# Patient Record
Sex: MALE | Race: WHITE | NOT HISPANIC OR LATINO | Employment: FULL TIME | URBAN - METROPOLITAN AREA
[De-identification: names, ages, dates, MRNs, and addresses within clinical notes are randomized per-mention and may not be internally consistent; named-entity substitution may affect disease eponyms.]

---

## 2017-01-27 ENCOUNTER — TELEPHONE (OUTPATIENT)
Dept: MEDICAL GROUP | Facility: CLINIC | Age: 57
End: 2017-01-27

## 2017-01-27 DIAGNOSIS — J00 ACUTE NASOPHARYNGITIS: ICD-10-CM

## 2017-01-27 RX ORDER — AZITHROMYCIN 250 MG/1
TABLET, FILM COATED ORAL
Qty: 6 TAB | Refills: 0 | Status: SHIPPED | OUTPATIENT
Start: 2017-01-27 | End: 2017-05-24

## 2017-01-28 NOTE — TELEPHONE ENCOUNTER
Patient's spouse called stating both of them are experimenting severe sinus congestion and would like to please have a Zpack sent to their pharmacy for each one?  Please advise    Gil Hart  [phone] (home)

## 2017-01-30 DIAGNOSIS — I10 ESSENTIAL HYPERTENSION: ICD-10-CM

## 2017-01-30 RX ORDER — CHLORTHALIDONE 25 MG/1
TABLET ORAL
Refills: 0 | OUTPATIENT
Start: 2017-01-30

## 2017-01-30 RX ORDER — CHLORTHALIDONE 25 MG/1
25 TABLET ORAL DAILY
Qty: 90 TAB | Refills: 3 | Status: SHIPPED | OUTPATIENT
Start: 2017-01-30 | End: 2018-02-09 | Stop reason: SDUPTHER

## 2017-01-30 NOTE — TELEPHONE ENCOUNTER
From: Gil Hart  To: Bhupinder Ryder M.D.  Sent: 1/30/2017 12:05 PM PST  Subject: Medication Renewal Request    Original authorizing provider: ALEX Martinez would like a refill of the following medications:  chlorthalidone (HYGROTON) 25 MG Tab [Bhupinder Ryder M.D.]    Preferred pharmacy: [facility]/PHARMACY #[id number] - [place], [place] - [address].    Comment:  [facility]/pharmacy: GIL, your Dr did not authorize a request to refill your Rx for CHL. Pls call  for more information. Reply HELP for help thank you

## 2017-02-06 DIAGNOSIS — E11.9 TYPE II DIABETES MELLITUS, WELL CONTROLLED (HCC): ICD-10-CM

## 2017-02-06 DIAGNOSIS — C91.10 CLL (CHRONIC LYMPHOCYTIC LEUKEMIA) (HCC): ICD-10-CM

## 2017-02-09 ENCOUNTER — HOSPITAL ENCOUNTER (OUTPATIENT)
Dept: LAB | Facility: MEDICAL CENTER | Age: 57
End: 2017-02-09
Attending: INTERNAL MEDICINE
Payer: COMMERCIAL

## 2017-02-09 DIAGNOSIS — C91.10 CLL (CHRONIC LYMPHOCYTIC LEUKEMIA) (HCC): ICD-10-CM

## 2017-02-09 DIAGNOSIS — E11.9 TYPE II DIABETES MELLITUS, WELL CONTROLLED (HCC): ICD-10-CM

## 2017-02-09 LAB
ANISOCYTOSIS BLD QL SMEAR: ABNORMAL
BASOPHILS # BLD AUTO: 0.1 K/UL (ref 0–0.12)
BASOPHILS NFR BLD AUTO: 0.9 % (ref 0–1.8)
EOSINOPHIL # BLD: 0 K/UL (ref 0–0.51)
EOSINOPHIL NFR BLD AUTO: 0 % (ref 0–6.9)
ERYTHROCYTE [DISTWIDTH] IN BLOOD BY AUTOMATED COUNT: 40.3 FL (ref 35.9–50)
EST. AVERAGE GLUCOSE BLD GHB EST-MCNC: 192 MG/DL
HBA1C MFR BLD: 8.3 % (ref 0–5.6)
HCT VFR BLD AUTO: 40.7 % (ref 42–52)
HGB BLD-MCNC: 13.4 G/DL (ref 14–18)
LYMPHOCYTES # BLD: 6.19 K/UL (ref 1–4.8)
LYMPHOCYTES NFR BLD AUTO: 53.4 % (ref 22–41)
MANUAL DIFF BLD: NORMAL
MCH RBC QN AUTO: 28.9 PG (ref 27–33)
MCHC RBC AUTO-ENTMCNC: 32.9 G/DL (ref 33.7–35.3)
MCV RBC AUTO: 87.7 FL (ref 81.4–97.8)
MONOCYTES # BLD: 0.41 K/UL (ref 0–0.85)
MONOCYTES NFR BLD AUTO: 3.5 % (ref 0–13.4)
MORPHOLOGY BLD-IMP: NORMAL
NEUTROPHILS # BLD: 4.9 K/UL (ref 1.82–7.42)
NEUTROPHILS NFR BLD AUTO: 42.2 % (ref 44–72)
NRBC # BLD AUTO: 0.02 K/UL
NRBC BLD-RTO: 0.2 /100 WBC
OVALOCYTES BLD QL SMEAR: NORMAL
PLATELET # BLD AUTO: 156 K/UL (ref 164–446)
PLATELET BLD QL SMEAR: NORMAL
PMV BLD AUTO: 11.6 FL (ref 9–12.9)
POIKILOCYTOSIS BLD QL SMEAR: NORMAL
RBC # BLD AUTO: 4.64 M/UL (ref 4.7–6.1)
RBC BLD AUTO: PRESENT
WBC # BLD AUTO: 11.6 K/UL (ref 4.8–10.8)

## 2017-02-09 PROCEDURE — 36415 COLL VENOUS BLD VENIPUNCTURE: CPT

## 2017-02-09 PROCEDURE — 83036 HEMOGLOBIN GLYCOSYLATED A1C: CPT

## 2017-02-09 PROCEDURE — 85007 BL SMEAR W/DIFF WBC COUNT: CPT

## 2017-02-09 PROCEDURE — 85027 COMPLETE CBC AUTOMATED: CPT

## 2017-02-13 ENCOUNTER — OFFICE VISIT (OUTPATIENT)
Dept: MEDICAL GROUP | Facility: CLINIC | Age: 57
End: 2017-02-13
Payer: COMMERCIAL

## 2017-02-13 VITALS
HEART RATE: 70 BPM | TEMPERATURE: 98.1 F | HEIGHT: 74 IN | SYSTOLIC BLOOD PRESSURE: 145 MMHG | DIASTOLIC BLOOD PRESSURE: 95 MMHG | OXYGEN SATURATION: 99 % | RESPIRATION RATE: 14 BRPM | WEIGHT: 216 LBS | BODY MASS INDEX: 27.72 KG/M2

## 2017-02-13 DIAGNOSIS — E11.9 TYPE 2 DIABETES MELLITUS WITHOUT COMPLICATION, WITHOUT LONG-TERM CURRENT USE OF INSULIN (HCC): ICD-10-CM

## 2017-02-13 DIAGNOSIS — I63.30 CEREBROVASCULAR ACCIDENT (CVA) DUE TO THROMBOSIS OF CEREBRAL ARTERY (HCC): ICD-10-CM

## 2017-02-13 DIAGNOSIS — Z98.84 HISTORY OF ROUX-EN-Y GASTRIC BYPASS: ICD-10-CM

## 2017-02-13 DIAGNOSIS — C91.10 CLL (CHRONIC LYMPHOCYTIC LEUKEMIA) (HCC): ICD-10-CM

## 2017-02-13 PROCEDURE — 99214 OFFICE O/P EST MOD 30 MIN: CPT | Performed by: INTERNAL MEDICINE

## 2017-02-13 NOTE — MR AVS SNAPSHOT
"        Gil Hailey   2017 8:20 AM   Office Visit   MRN: [id number]    Department:  [facility]   Dept Phone:  [phone]    Description:  Male : 1960   Provider:  Altaf Swenson D.O.           Reason for Visit     Follow-Up [facility] diabetes and stroke      Allergies as of 2017     Allergen Noted Reactions    Pcn [Penicillins] 2013         You were diagnosed with     CLL (chronic lymphocytic leukemia) (CMS-HCC)   [431975]       Type 2 diabetes mellitus without complication, without long-term current use of insulin (CMS-HCC)   [9246445]       History of Amanda-en-Y gastric bypass   [335588]         Vital Signs     Blood Pressure Pulse Temperature Respirations Height Weight    145/95 mmHg 70 36.7 °C (98.1 °F) 14 1.88 m (6' 2.02\") 97.977 kg (216 lb)    Body Mass Index Oxygen Saturation Smoking Status             27.72 kg/m2 99% Never Smoker          Basic Information     Date Of Birth Sex Race Ethnicity Preferred Language    1960 Male White Non- English      Problem List              ICD-10-CM Priority Class Noted - Resolved    Low back pain radiating to right leg M54.5   12/3/2013 - Present    Mixed hyperlipidemia E78.2   12/3/2013 - Present    Hypertension I10   12/3/2013 - Present    Vitamin D deficiency disease E55.9   12/3/2013 - Present    Personal history of alcoholism (CMS-HCC) F10.21   12/3/2013 - Present    History of Amanda-en-Y gastric bypass Z98.84   12/3/2013 - Present    History of chronic pancreatitis Z87.19   12/3/2013 - Present    CLL (chronic lymphocytic leukemia) (CMS-HCC) C91.10   2015 - Present    Type 2 diabetes mellitus without complication (CMS-HCC) E11.9   2016 - Present    Family history of cerebral aneurysm Z82.49   2016 - Present    Stroke (CMS-HCC) I63.9 High  2016 - Present    Essential hypertension I10   2016 - Present    Obesity (BMI 30-39.9) E66.9   2016 - Present      Health Maintenance        Date Due Completion " Dates    IMM HEP B VACCINE (1 of 3 - Primary Series) 1960 ---    IMM PNEUMOCOCCAL 19-64 (ADULT) HIGHEST RISK SERIES (2 of 3 - PPSV23) 1/4/2016 11/9/2015    IMM INFLUENZA (1) 9/1/2016 11/9/2015, 12/3/2013    URINE ACR / MICROALBUMIN 2/9/2017 2/9/2016, 2/6/2015, 12/18/2013    DIABETES MONOFILAMENT / LE EXAM 2/9/2017 2/9/2016 (Done), 2/10/2015 (Done), 1/17/2014 (Done)    Override on 2/9/2016: Done    Override on 2/10/2015: Done    Override on 1/17/2014: Done    A1C SCREENING 8/9/2017 2/9/2017, 9/1/2016, 8/3/2016, 2/9/2016, 2/6/2015, 12/18/2013    FASTING LIPID PROFILE 9/1/2017 9/1/2016, 2/9/2016, 2/6/2015, 12/18/2013    SERUM CREATININE 9/1/2017 9/1/2016, 4/13/2016, 4/12/2016, 2/9/2016, 11/4/2015, 2/6/2015, 12/18/2013, 10/17/2013    RETINAL SCREENING 9/2/2017 9/2/2016, 3/26/2015    COLONOSCOPY 5/11/2021 5/11/2011 (N/S)    Override on 5/11/2011: (N/S)    IMM DTaP/Tdap/Td Vaccine (2 - Td) 10/23/2025 10/23/2015            Current Immunizations     13-VALENT PCV PREVNAR 11/9/2015    Influenza TIV (IM) 12/3/2013    Influenza Vaccine Quad Inj (Pf) 11/9/2015    Tdap Vaccine 10/23/2015      Below and/or attached are the medications your provider expects you to take. Review all of your home medications and newly ordered medications with your provider and/or pharmacist. Follow medication instructions as directed by your provider and/or pharmacist. Please keep your medication list with you and share with your provider. Update the information when medications are discontinued, doses are changed, or new medications (including over-the-counter products) are added; and carry medication information at all times in the event of emergency situations     Allergies:  PCN - (reactions not documented)               Medications  Valid as of: February 13, 2017 -  9:29 AM    Generic Name Brand Name Tablet Size Instructions for use    Albuterol Sulfate (Aero Soln) albuterol 108 (90 BASE) MCG/ACT Inhale 2 Puffs by mouth every 6 hours as  needed for Shortness of Breath.        Azithromycin (Tab) ZITHROMAX 250 MG Two day one then qd x 4.        Blood Glucose Monitoring Suppl (Misc) Blood Glucose Monitoring Suppl SUPPLIES Test strips order: Test strips for One Touch Ultra 2 meter. Sig: use daily and prn ssx high or low sugar #100 RF x 3  Dx 250.00        Blood Glucose Monitoring Suppl (Misc) Blood Glucose Monitoring Suppl SUPPLIES Test strips order: Test strips for One Touch Ultra 2 meter. Sig: use qd and prn ssx high or low sugar #100 RF x 1        Celecoxib (Cap) CELEBREX 200 MG Take 1 Cap by mouth 2 times a day.        Chlorthalidone (Tab) HYGROTON 25 MG Take 1 Tab by mouth every day.        Cholecalciferol (Tab) vitamin D3 (cholecalciferol) 1000 UNIT Take 2 Tabs by mouth every day.        Clopidogrel Bisulfate (Tab) PLAVIX 75 MG Take 1 Tab by mouth every day.        Cyanocobalamin   Take  by mouth.        DiphenhydrAMINE HCl (Tab) BENADRYL 25 MG Take 50 mg by mouth every 6 hours as needed for Sleep.        GlipiZIDE (Tab) GLUCOTROL 5 MG TAKE 1 TABLET BY MOUTH TWICE DAILY        Loratadine   Take  by mouth.        Losartan Potassium (Tab) COZAAR 100 MG TAKE 1 TAB BY MOUTH DAILY.        MetFORMIN HCl (Tab) GLUCOPHAGE 1000 MG TAKE 1 TABLET BY MOUTH TWICE DAILY WITH MEALS        Metoprolol Tartrate (Tab) LOPRESSOR 100 MG Take 0.5 Tabs by mouth 2 times a day.        Multiple Vitamins-Minerals (Tab) CENTRUM  Take 1 Tab by mouth every day.        Multiple Vitamins-Minerals   Take  by mouth.        Oseltamivir Phosphate (Cap) TAMIFLU 75 MG Take 1 Cap by mouth 2 times a day.        Pravastatin Sodium (Tab) PRAVACHOL 40 MG TAKE 1 TABLET BY MOUTH ATBEDTIME        Tadalafil (Tab) CIALIS 20 MG TAKE 1 TABLET BY MOUTH ASNEEDED        .                 Medicines prescribed today were sent to:     [facility]/PHARMACY #[id number] - [place] - [address]    [address] [address]    Phone: [phone] Fax: [phone]    Open 24 Hours?: No      Medication  refill instructions:       If your prescription bottle indicates you have medication refills left, it is not necessary to call your provider’s office. Please contact your pharmacy and they will refill your medication.    If your prescription bottle indicates you do not have any refills left, you may request refills at any time through one of the following ways: The online [employer] system (except Urgent Care), by calling your provider’s office, or by asking your pharmacy to contact your provider’s office with a refill request. Medication refills are processed only during regular business hours and may not be available until the next business day. Your provider may request additional information or to have a follow-up visit with you prior to refilling your medication.   *Please Note: Medication refills are assigned a new Rx number when refilled electronically. Your pharmacy may indicate that no refills were authorized even though a new prescription for the same medication is available at the pharmacy. Please request the medicine by name with the pharmacy before contacting your provider for a refill.        Your To Do List     Future Labs/Procedures Complete By Expires    CBC WITH DIFFERENTIAL  As directed 2/14/2018    COMP METABOLIC PANEL  As directed 2/14/2018    CT-CARDIAC SCORING  As directed 2/13/2018    HEMOGLOBIN A1C  As directed 2/14/2018    LIPID PROFILE  As directed 2/14/2018    MICROALBUMIN CREAT RATIO URINE  As directed 2/13/2018      Referral     A referral request has been sent to our patient care coordination department. Please allow 3-5 business days for us to process this request and contact you either by phone or mail. If you do not hear from us by the 5th business day, please call us at [phone].           [employer] Access Code: Activation code not generated  Current [employer] Status: Active

## 2017-02-14 NOTE — PROGRESS NOTES
CC: Gil Hart is a 56 y.o. male is suffering from   Chief Complaint   Patient presents with   • Follow-Up     [facility] diabetes and stroke         SUBJECTIVE:  1. Cerebrovascular accident (CVA) due to thrombosis of cerebral artery (CMS-[facility])  Patient's here for follow-up regarding his CVA, patient and I have reviewed his previous MRI which does show that he has infarction which is minor in the left pontine region of the brain. Patient has recovered well from this with only minor numbness associated with his lips.     2. Type 2 diabetes mellitus without complication, without long-term current use of insulin (CMS-[facility])  Patient has a history of type 2 diabetes without complication, is having problems with worsening control suspect this is due to problems with diet and exercise. .     3. CLL (chronic lymphocytic leukemia) (CMS-[facility])  Patient with a history of CLL clinically stable    4. History of Amanda-en-Y gastric bypass  Patient has previously undergone gastric bypass surgery, we've discussed his need to be very careful with diet going forwards. Needs to improve on his exercise        Past social, family, history: , Lanie  Social History   Substance Use Topics   • Smoking status: Never Smoker    • Smokeless tobacco: Never Used   • Alcohol Use: No      Comment: Past history of alcoholism--last drink was 2010--attends Recovery Meetings at his [employer]         MEDICATIONS:    Current outpatient prescriptions:   •  chlorthalidone (HYGROTON) 25 MG Tab, Take 1 Tab by mouth every day., Disp: 90 Tab, Rfl: 3  •  glipiZIDE (GLUCOTROL) 5 MG Tab, TAKE 1 TABLET BY MOUTH TWICE DAILY, Disp: 180 Tab, Rfl: 3  •  metformin (GLUCOPHAGE) 1000 MG tablet, TAKE 1 TABLET BY MOUTH TWICE DAILY WITH MEALS, Disp: 180 Tab, Rfl: 3  •  losartan (COZAAR) 100 MG Tab, TAKE 1 TAB BY MOUTH DAILY., Disp: 90 Tab, Rfl: 3  •  metoprolol (LOPRESSOR) 100 MG Tab, Take 0.5 Tabs by mouth 2 times a day., Disp: 90 Tab, Rfl: 3  •  celecoxib (CELEBREX) 200 MG Cap,  Take 1 Cap by mouth 2 times a day., Disp: 60 Cap, Rfl: 3  •  clopidogrel (PLAVIX) 75 MG Tab, Take 1 Tab by mouth every day., Disp: 90 Tab, Rfl: 3  •  Blood Glucose Monitoring Suppl SUPPLIES Misc, Test strips order: Test strips for One Touch Ultra 2 meter. Sig: use qd and prn ssx high or low sugar #100 RF x 1, Disp: 100 Strip, Rfl: 1  •  tadalafil (CIALIS) 20 MG tablet, TAKE 1 TABLET BY MOUTH ASNEEDED, Disp: 18 Tab, Rfl: 3  •  diphenhydrAMINE (BENADRYL) 25 MG Tab, Take 50 mg by mouth every 6 hours as needed for Sleep., Disp: , Rfl:   •  pravastatin (PRAVACHOL) 40 MG tablet, TAKE 1 TABLET BY MOUTH ATBEDTIME, Disp: 90 Tab, Rfl: 3  •  Loratadine (CLARITIN PO), Take  by mouth., Disp: , Rfl:   •  Cyanocobalamin (VITAMIN B-12 PO), Take  by mouth., Disp: , Rfl:   •  Blood Glucose Monitoring Suppl SUPPLIES MISC, Test strips order: Test strips for One Touch Ultra 2 meter. Sig: use daily and prn ssx high or low sugar #100 RF x 3  Dx 250.00, Disp: 100 Each, Rfl: 3  •  Cholecalciferol (VITAMIN D3) 1000 UNIT TABS, Take 2 Tabs by mouth every day., Disp: 100 Tab, Rfl: 3  •  Multiple Vitamins-Minerals (CENTRUM) TABS, Take 1 Tab by mouth every day., Disp: 100 Tab, Rfl: 3  •  azithromycin (ZITHROMAX) 250 MG Tab, Two day one then qd x 4., Disp: 6 Tab, Rfl: 0  •  oseltamivir (TAMIFLU) 75 MG Cap, Take 1 Cap by mouth 2 times a day. (Patient not taking: Reported on 2/13/2017), Disp: 10 Cap, Rfl: 0  •  albuterol (VENTOLIN OR PROVENTIL) 108 (90 BASE) MCG/ACT Aero Soln inhalation aerosol, Inhale 2 Puffs by mouth every 6 hours as needed for Shortness of Breath. (Patient not taking: Reported on 2/13/2017), Disp: 8.5 g, Rfl: 3  •  Multiple Vitamins-Minerals (MULTIVITAMIN PO), Take  by mouth., Disp: , Rfl:     PROBLEMS:  Patient Active Problem List    Diagnosis Date Noted   • Stroke (CMS-[facility]) 04/12/2016     Priority: High   • Obesity (BMI 30-39.9) 09/02/2016   • Essential hypertension 04/13/2016   • Family history of cerebral aneurysm 02/09/2016  "  • Type 2 diabetes mellitus without complication (CMS-HCC) 02/05/2016   • CLL (chronic lymphocytic leukemia) (CMS-HCC) 11/09/2015   • Low back pain radiating to right leg 12/03/2013   • Mixed hyperlipidemia 12/03/2013   • Hypertension 12/03/2013   • Vitamin D deficiency disease 12/03/2013   • Personal history of alcoholism (CMS-HCC) 12/03/2013   • History of Amanda-en-Y gastric bypass 12/03/2013   • History of chronic pancreatitis 12/03/2013       REVIEW OF SYSTEMS:  Gen.:  No Nausea, Vomiting, fever, Chills.  Heart: No chest pain.  Lungs:  No shortness of Breath.  Psychological: Víctor unusual Anxiety depression     PHYSICAL EXAM   Constitutional: Alert, cooperative, not in acute distress.  Cardiovascular:  Rate Rhythm is regular without murmurs rubs clicks.     Thorax & Lungs: Clear to auscultation, no wheezing, rhonchi, or rales  HENT: Normocephalic, Atraumatic.  Eyes: PERRLA, EOMI, Conjunctiva normal.   Neck: Trachia is midline no swelling of the thyroid.   Lymphatic: No lymphadenopathy noted.   Musculoskeletal: No evidence of diabetic neuropathy to monofilament wire testing.   Neurologic: Alert & oriented x 3, cranial nerves II through XII are intact, Normal motor function, Normal sensory function, No focal deficits noted.   Psychiatric: Affect normal, Judgment normal, Mood normal.     VITAL SIGNS:/95 mmHg  Pulse 70  Temp(Src) 36.7 °C (98.1 °F)  Resp 14  Ht 1.88 m (6' 2.02\")  Wt 97.977 kg (216 lb)  BMI 27.72 kg/m2  SpO2 99%    Labs: Reviewed    Assessment:                                                     Plan:    1. Cerebrovascular accident (CVA) due to thrombosis of cerebral artery (CMS-HCC)  Cerebral vascular accident and MRI reviewed clinically stable    2. Type 2 diabetes mellitus without complication, without long-term current use of insulin (CMS-HCC)  Patient and I have discussed the importance of working on diet exercise  - COMP METABOLIC PANEL; Future  - CBC WITH DIFFERENTIAL; Future  - " CT-CARDIAC SCORING; Future  - MICROALBUMIN CREAT RATIO URINE; Future  - Diabetic Monofilament Lower Extremity Exam  Lipid profile  Hemoglobin A1c    3. CLL (chronic lymphocytic leukemia) (CMS-HCC)  Referral written to hematology oncology  - REFERRAL TO HEMATOLOGY ONCOLOGY Referral to?: Other    4. History of Amanda-en-Y gastric bypass  Patient needs to avoid nonsteroidal anti-inflammatories

## 2017-02-17 ENCOUNTER — OFFICE VISIT (OUTPATIENT)
Dept: MEDICAL GROUP | Facility: CLINIC | Age: 57
End: 2017-02-17
Payer: COMMERCIAL

## 2017-02-17 VITALS
SYSTOLIC BLOOD PRESSURE: 126 MMHG | HEIGHT: 74 IN | WEIGHT: 221 LBS | OXYGEN SATURATION: 100 % | TEMPERATURE: 97.7 F | DIASTOLIC BLOOD PRESSURE: 80 MMHG | BODY MASS INDEX: 28.36 KG/M2 | RESPIRATION RATE: 16 BRPM | HEART RATE: 94 BPM

## 2017-02-17 DIAGNOSIS — S81.801D WOUND, OPEN, LEG, RIGHT, SUBSEQUENT ENCOUNTER: ICD-10-CM

## 2017-02-17 PROCEDURE — 99213 OFFICE O/P EST LOW 20 MIN: CPT | Performed by: INTERNAL MEDICINE

## 2017-02-17 RX ORDER — CEPHALEXIN 500 MG/1
500 CAPSULE ORAL 4 TIMES DAILY
Qty: 28 CAP | Refills: 0 | Status: SHIPPED | OUTPATIENT
Start: 2017-02-17 | End: 2017-05-24

## 2017-02-18 NOTE — PROGRESS NOTES
CC: Gil Hart is a 56 y.o. male is suffering from   Chief Complaint   Patient presents with   • Laceration     (R) knee, fell 2 weeks ago         SUBJECTIVE:  1. Wound, open, leg, right, subsequent encounter  Patient's here for follow-up, has an open wound right leg. Is on Plavix, compression bandage was applied no evidence of infection        Past social, family, history: , Lanie  Social History   Substance Use Topics   • Smoking status: Never Smoker    • Smokeless tobacco: Never Used   • Alcohol Use: No      Comment: Past history of alcoholism--last drink was 2010--attends Recovery Meetings at his [employer]         MEDICATIONS:    Current outpatient prescriptions:   •  cephALEXin (KEFLEX) 500 MG Cap, Take 1 Cap by mouth 4 times a day., Disp: 28 Cap, Rfl: 0  •  chlorthalidone (HYGROTON) 25 MG Tab, Take 1 Tab by mouth every day., Disp: 90 Tab, Rfl: 3  •  azithromycin (ZITHROMAX) 250 MG Tab, Two day one then qd x 4., Disp: 6 Tab, Rfl: 0  •  oseltamivir (TAMIFLU) 75 MG Cap, Take 1 Cap by mouth 2 times a day., Disp: 10 Cap, Rfl: 0  •  glipiZIDE (GLUCOTROL) 5 MG Tab, TAKE 1 TABLET BY MOUTH TWICE DAILY, Disp: 180 Tab, Rfl: 3  •  metformin (GLUCOPHAGE) 1000 MG tablet, TAKE 1 TABLET BY MOUTH TWICE DAILY WITH MEALS, Disp: 180 Tab, Rfl: 3  •  losartan (COZAAR) 100 MG Tab, TAKE 1 TAB BY MOUTH DAILY., Disp: 90 Tab, Rfl: 3  •  metoprolol (LOPRESSOR) 100 MG Tab, Take 0.5 Tabs by mouth 2 times a day., Disp: 90 Tab, Rfl: 3  •  celecoxib (CELEBREX) 200 MG Cap, Take 1 Cap by mouth 2 times a day., Disp: 60 Cap, Rfl: 3  •  clopidogrel (PLAVIX) 75 MG Tab, Take 1 Tab by mouth every day., Disp: 90 Tab, Rfl: 3  •  Blood Glucose Monitoring Suppl SUPPLIES Misc, Test strips order: Test strips for One Touch Ultra 2 meter. Sig: use qd and prn ssx high or low sugar #100 RF x 1, Disp: 100 Strip, Rfl: 1  •  tadalafil (CIALIS) 20 MG tablet, TAKE 1 TABLET BY MOUTH ASNEEDED, Disp: 18 Tab, Rfl: 3  •  diphenhydrAMINE (BENADRYL) 25 MG Tab,  Take 50 mg by mouth every 6 hours as needed for Sleep., Disp: , Rfl:   •  albuterol (VENTOLIN OR PROVENTIL) 108 (90 BASE) MCG/ACT Aero Soln inhalation aerosol, Inhale 2 Puffs by mouth every 6 hours as needed for Shortness of Breath., Disp: 8.5 g, Rfl: 3  •  pravastatin (PRAVACHOL) 40 MG tablet, TAKE 1 TABLET BY MOUTH ATBEDTIME, Disp: 90 Tab, Rfl: 3  •  Loratadine (CLARITIN PO), Take  by mouth., Disp: , Rfl:   •  Multiple Vitamins-Minerals (MULTIVITAMIN PO), Take  by mouth., Disp: , Rfl:   •  Cyanocobalamin (VITAMIN B-12 PO), Take  by mouth., Disp: , Rfl:   •  Blood Glucose Monitoring Suppl SUPPLIES MISC, Test strips order: Test strips for One Touch Ultra 2 meter. Sig: use daily and prn ssx high or low sugar #100 RF x 3  Dx 250.00, Disp: 100 Each, Rfl: 3  •  Cholecalciferol (VITAMIN D3) 1000 UNIT TABS, Take 2 Tabs by mouth every day., Disp: 100 Tab, Rfl: 3  •  Multiple Vitamins-Minerals (CENTRUM) TABS, Take 1 Tab by mouth every day., Disp: 100 Tab, Rfl: 3    PROBLEMS:  Patient Active Problem List    Diagnosis Date Noted   • Stroke (CMS-HCC) 04/12/2016     Priority: High   • Obesity (BMI 30-39.9) 09/02/2016   • Essential hypertension 04/13/2016   • Family history of cerebral aneurysm 02/09/2016   • Type 2 diabetes mellitus without complication (CMS-HCC) 02/05/2016   • CLL (chronic lymphocytic leukemia) (CMS-HCC) 11/09/2015   • Low back pain radiating to right leg 12/03/2013   • Mixed hyperlipidemia 12/03/2013   • Hypertension 12/03/2013   • Vitamin D deficiency disease 12/03/2013   • Personal history of alcoholism (CMS-HCC) 12/03/2013   • History of Amanda-en-Y gastric bypass 12/03/2013   • History of chronic pancreatitis 12/03/2013       REVIEW OF SYSTEMS:  Gen.:  No Nausea, Vomiting, fever, Chills.  Heart: No chest pain.  Lungs:  No shortness of Breath.  Psychological: Víctor unusual Anxiety depression     PHYSICAL EXAM   Constitutional: Alert, cooperative, not in acute distress.  Cardiovascular:  Rate Rhythm is  "regular without murmurs rubs clicks.     Thorax & Lungs: Clear to auscultation, no wheezing, rhonchi, or rales  HENT: Normocephalic, Atraumatic.  Eyes: PERRLA, EOMI, Conjunctiva normal.   Neck: Trachia is midline no swelling of the thyroid.   Neurologic: Alert & oriented x 3, cranial nerves II through XII are intact, Normal motor function, Normal sensory function, No focal deficits noted.   Psychiatric: Affect normal, Judgment normal, Mood normal.     VITAL SIGNS:/80 mmHg  Pulse 94  Temp(Src) 36.5 °C (97.7 °F)  Resp 16  Ht 1.88 m (6' 2.02\")  Wt 100.245 kg (221 lb)  BMI 28.36 kg/m2  SpO2 100%    Labs: Reviewed    Assessment:                                                     Plan:    1. Wound, open, leg, right, subsequent encounter  Patient given prophylactic Keflex, this is not to be taken unless signs of infection. Wife is a medical assistant.  - cephALEXin (KEFLEX) 500 MG Cap; Take 1 Cap by mouth 4 times a day.  Dispense: 28 Cap; Refill: 0          "

## 2017-03-06 ENCOUNTER — HOSPITAL ENCOUNTER (OUTPATIENT)
Dept: RADIOLOGY | Facility: MEDICAL CENTER | Age: 57
End: 2017-03-06
Attending: INTERNAL MEDICINE
Payer: COMMERCIAL

## 2017-03-06 DIAGNOSIS — E11.9 TYPE 2 DIABETES MELLITUS WITHOUT COMPLICATION, WITHOUT LONG-TERM CURRENT USE OF INSULIN (HCC): ICD-10-CM

## 2017-03-06 PROCEDURE — 4410556 CT-CARDIAC SCORING

## 2017-03-08 DIAGNOSIS — E11.9 TYPE 2 DIABETES MELLITUS WITHOUT COMPLICATION, WITHOUT LONG-TERM CURRENT USE OF INSULIN (HCC): ICD-10-CM

## 2017-03-08 DIAGNOSIS — E78.5 DYSLIPIDEMIA: ICD-10-CM

## 2017-03-17 ENCOUNTER — HOSPITAL ENCOUNTER (OUTPATIENT)
Dept: LAB | Facility: MEDICAL CENTER | Age: 57
End: 2017-03-17
Attending: SPECIALIST
Payer: COMMERCIAL

## 2017-03-17 LAB
ALBUMIN SERPL BCP-MCNC: 4.4 G/DL (ref 3.2–4.9)
ALBUMIN/GLOB SERPL: 1.4 G/DL
ALP SERPL-CCNC: 85 U/L (ref 30–99)
ALT SERPL-CCNC: 16 U/L (ref 2–50)
ANION GAP SERPL CALC-SCNC: 9 MMOL/L (ref 0–11.9)
ANISOCYTOSIS BLD QL SMEAR: ABNORMAL
AST SERPL-CCNC: 17 U/L (ref 12–45)
BASOPHILS # BLD AUTO: 0 K/UL (ref 0–0.12)
BASOPHILS NFR BLD AUTO: 0 % (ref 0–1.8)
BILIRUB SERPL-MCNC: 0.5 MG/DL (ref 0.1–1.5)
BUN SERPL-MCNC: 19 MG/DL (ref 8–22)
CALCIUM SERPL-MCNC: 9.1 MG/DL (ref 8.5–10.5)
CHLORIDE SERPL-SCNC: 100 MMOL/L (ref 96–112)
CO2 SERPL-SCNC: 28 MMOL/L (ref 20–33)
CREAT SERPL-MCNC: 1.19 MG/DL (ref 0.5–1.4)
EOSINOPHIL # BLD: 0 K/UL (ref 0–0.51)
EOSINOPHIL NFR BLD AUTO: 0 % (ref 0–6.9)
ERYTHROCYTE [DISTWIDTH] IN BLOOD BY AUTOMATED COUNT: 38.2 FL (ref 35.9–50)
GLOBULIN SER CALC-MCNC: 3.1 G/DL (ref 1.9–3.5)
GLUCOSE SERPL-MCNC: 199 MG/DL (ref 65–99)
HCT VFR BLD AUTO: 39.9 % (ref 42–52)
HGB BLD-MCNC: 13.9 G/DL (ref 14–18)
LDH SERPL L TO P-CCNC: 110 U/L (ref 107–266)
LG PLATELETS BLD QL SMEAR: NORMAL
LYMPHOCYTES # BLD: 8.96 K/UL (ref 1–4.8)
LYMPHOCYTES NFR BLD AUTO: 67.4 % (ref 22–41)
MANUAL DIFF BLD: NORMAL
MCH RBC QN AUTO: 29.6 PG (ref 27–33)
MCHC RBC AUTO-ENTMCNC: 34.8 G/DL (ref 33.7–35.3)
MCV RBC AUTO: 85.1 FL (ref 81.4–97.8)
MICROCYTES BLD QL SMEAR: ABNORMAL
MONOCYTES # BLD: 0.31 K/UL (ref 0–0.85)
MONOCYTES NFR BLD AUTO: 2.3 % (ref 0–13.4)
MORPHOLOGY BLD-IMP: NORMAL
NEUTROPHILS # BLD: 4.03 K/UL (ref 1.82–7.42)
NEUTROPHILS NFR BLD AUTO: 30.3 % (ref 44–72)
NRBC # BLD AUTO: 0 K/UL
NRBC BLD-RTO: 0 /100 WBC
OVALOCYTES BLD QL SMEAR: NORMAL
PLATELET # BLD AUTO: 166 K/UL (ref 164–446)
PLATELET BLD QL SMEAR: NORMAL
PMV BLD AUTO: 11.9 FL (ref 9–12.9)
POIKILOCYTOSIS BLD QL SMEAR: NORMAL
POTASSIUM SERPL-SCNC: 4 MMOL/L (ref 3.6–5.5)
PROT SERPL-MCNC: 7.5 G/DL (ref 6–8.2)
RBC # BLD AUTO: 4.69 M/UL (ref 4.7–6.1)
RBC BLD AUTO: PRESENT
SMUDGE CELLS BLD QL SMEAR: NORMAL
SODIUM SERPL-SCNC: 137 MMOL/L (ref 135–145)
WBC # BLD AUTO: 13.3 K/UL (ref 4.8–10.8)

## 2017-03-17 PROCEDURE — 36415 COLL VENOUS BLD VENIPUNCTURE: CPT

## 2017-03-17 PROCEDURE — 85027 COMPLETE CBC AUTOMATED: CPT

## 2017-03-17 PROCEDURE — 83615 LACTATE (LD) (LDH) ENZYME: CPT

## 2017-03-17 PROCEDURE — 85007 BL SMEAR W/DIFF WBC COUNT: CPT

## 2017-03-17 PROCEDURE — 80053 COMPREHEN METABOLIC PANEL: CPT

## 2017-05-15 DIAGNOSIS — E78.2 MIXED HYPERLIPIDEMIA: ICD-10-CM

## 2017-05-16 RX ORDER — PRAVASTATIN SODIUM 40 MG
TABLET ORAL
Qty: 90 TAB | Refills: 0 | OUTPATIENT
Start: 2017-05-16

## 2017-05-16 RX ORDER — PRAVASTATIN SODIUM 40 MG
40 TABLET ORAL DAILY
Qty: 90 TAB | Refills: 3 | Status: SHIPPED | OUTPATIENT
Start: 2017-05-16 | End: 2017-05-16

## 2017-05-16 RX ORDER — PRAVASTATIN SODIUM 40 MG
TABLET ORAL
Qty: 90 TAB | Refills: 3 | Status: SHIPPED | OUTPATIENT
Start: 2017-05-16 | End: 2018-06-26

## 2017-05-19 ENCOUNTER — OFFICE VISIT (OUTPATIENT)
Dept: MEDICAL GROUP | Facility: CLINIC | Age: 57
End: 2017-05-19
Payer: COMMERCIAL

## 2017-05-19 VITALS
BODY MASS INDEX: 28.36 KG/M2 | OXYGEN SATURATION: 94 % | RESPIRATION RATE: 16 BRPM | TEMPERATURE: 97.8 F | SYSTOLIC BLOOD PRESSURE: 120 MMHG | HEART RATE: 86 BPM | HEIGHT: 74 IN | WEIGHT: 221 LBS | DIASTOLIC BLOOD PRESSURE: 70 MMHG

## 2017-05-19 DIAGNOSIS — R68.84 JAW PAIN: ICD-10-CM

## 2017-05-19 PROCEDURE — 99213 OFFICE O/P EST LOW 20 MIN: CPT | Performed by: NURSE PRACTITIONER

## 2017-05-19 RX ORDER — IBUPROFEN 200 MG
400 TABLET ORAL EVERY 6 HOURS PRN
Status: ON HOLD | COMMUNITY
End: 2017-06-01

## 2017-05-19 ASSESSMENT — ENCOUNTER SYMPTOMS
SORE THROAT: 0
WHEEZING: 0
HEADACHES: 1
NAUSEA: 0
COUGH: 0
SHORTNESS OF BREATH: 0
CHILLS: 0
VOMITING: 0
DIZZINESS: 0
FEVER: 0
SPUTUM PRODUCTION: 0

## 2017-05-19 NOTE — MR AVS SNAPSHOT
"        Gil Hart   2017 3:00 PM   Office Visit   MRN: [id number]    Department:  [facility]   Dept Phone:  [phone]    Description:  Male : 1960   Provider:  JENNIFER Villegas           Reason for Visit     Jaw Pain Right side x 2 days       Allergies as of 2017     Allergen Noted Reactions    Pcn [Penicillins] 2013         You were diagnosed with     Jaw pain   [784.92.ICD-9-CM]         Vital Signs     Blood Pressure Pulse Temperature Respirations Height Weight    120/70 mmHg 86 36.6 °C (97.8 °F) 16 1.88 m (6' 2\") 100.245 kg (221 lb)    Body Mass Index Oxygen Saturation Smoking Status             28.36 kg/m2 94% Never Smoker          Basic Information     Date Of Birth Sex Race Ethnicity Preferred Language    1960 Male White Non- English      Problem List              ICD-10-CM Priority Class Noted - Resolved    Low back pain radiating to right leg M54.5   12/3/2013 - Present    Mixed hyperlipidemia E78.2   12/3/2013 - Present    Hypertension I10   12/3/2013 - Present    Vitamin D deficiency disease E55.9   12/3/2013 - Present    Personal history of alcoholism F10.21   12/3/2013 - Present    History of Amanda-en-Y gastric bypass Z98.84   12/3/2013 - Present    History of chronic pancreatitis Z87.19   12/3/2013 - Present    CLL (chronic lymphocytic leukemia) (CMS-HCC) C91.10   2015 - Present    Type 2 diabetes mellitus without complication (CMS-HCC) E11.9   2016 - Present    Family history of cerebral aneurysm Z82.49   2016 - Present    Stroke (CMS-HCC) I63.9 High  2016 - Present    Essential hypertension I10   2016 - Present    Obesity (BMI 30-39.9) E66.9   2016 - Present      Health Maintenance        Date Due Completion Dates    IMM HEP B VACCINE (1 of 3 - Primary Series) 1960 ---    IMM PNEUMOCOCCAL 19-64 (ADULT) HIGHEST RISK SERIES (2 of 3 - PPSV23) 2016    URINE ACR / MICROALBUMIN 2017, 2015, " 12/18/2013    A1C SCREENING 8/9/2017 2/9/2017, 9/1/2016, 8/3/2016, 2/9/2016, 2/6/2015, 12/18/2013    FASTING LIPID PROFILE 9/1/2017 9/1/2016, 2/9/2016, 2/6/2015, 12/18/2013    RETINAL SCREENING 9/2/2017 9/2/2016, 3/26/2015    DIABETES MONOFILAMENT / LE EXAM 2/13/2018 2/13/2017, 2/9/2016 (Done), 2/10/2015 (Done), 1/17/2014 (Done)    Override on 2/9/2016: Done    Override on 2/10/2015: Done    Override on 1/17/2014: Done    SERUM CREATININE 3/17/2018 3/17/2017, 9/1/2016, 4/13/2016, 4/12/2016, 2/9/2016, 11/4/2015, 2/6/2015, 12/18/2013, 10/17/2013    COLONOSCOPY 5/11/2021 5/11/2011 (N/S)    Override on 5/11/2011: (N/S)    IMM DTaP/Tdap/Td Vaccine (2 - Td) 10/23/2025 10/23/2015            Current Immunizations     13-VALENT PCV PREVNAR 11/9/2015    Influenza TIV (IM) 12/3/2013    Influenza Vaccine Quad Inj (Pf) 11/9/2015    Tdap Vaccine 10/23/2015      Below and/or attached are the medications your provider expects you to take. Review all of your home medications and newly ordered medications with your provider and/or pharmacist. Follow medication instructions as directed by your provider and/or pharmacist. Please keep your medication list with you and share with your provider. Update the information when medications are discontinued, doses are changed, or new medications (including over-the-counter products) are added; and carry medication information at all times in the event of emergency situations     Allergies:  PCN - (reactions not documented)               Medications  Valid as of: May 19, 2017 -  4:29 PM    Generic Name Brand Name Tablet Size Instructions for use    Albuterol Sulfate (Aero Soln) albuterol 108 (90 BASE) MCG/ACT Inhale 2 Puffs by mouth every 6 hours as needed for Shortness of Breath.        Azithromycin (Tab) ZITHROMAX 250 MG Two day one then qd x 4.        Blood Glucose Monitoring Suppl (Misc) Blood Glucose Monitoring Suppl SUPPLIES Test strips order: Test strips for One Touch Ultra 2 meter. Sig:  use daily and prn ssx high or low sugar #100 RF x 3  Dx 250.00        Blood Glucose Monitoring Suppl (Misc) Blood Glucose Monitoring Suppl SUPPLIES Test strips order: Test strips for One Touch Ultra 2 meter. Sig: use qd and prn ssx high or low sugar #100 RF x 1        Celecoxib (Cap) CELEBREX 200 MG Take 1 Cap by mouth 2 times a day.        Cephalexin (Cap) KEFLEX 500 MG Take 1 Cap by mouth 4 times a day.        Chlorthalidone (Tab) HYGROTON 25 MG Take 1 Tab by mouth every day.        Cholecalciferol (Tab) vitamin D3 (cholecalciferol) 1000 UNIT Take 2 Tabs by mouth every day.        Clopidogrel Bisulfate (Tab) PLAVIX 75 MG Take 1 Tab by mouth every day.        Cyanocobalamin   Take  by mouth.        DiphenhydrAMINE HCl (Tab) BENADRYL 25 MG Take 50 mg by mouth every 6 hours as needed for Sleep.        GlipiZIDE (Tab) GLUCOTROL 5 MG TAKE 1 TABLET BY MOUTH TWICE DAILY        Ibuprofen (Tab) MOTRIN 200 MG Take 200 mg by mouth every 6 hours as needed.        Loratadine   Take  by mouth.        Losartan Potassium (Tab) COZAAR 100 MG TAKE 1 TAB BY MOUTH DAILY.        MetFORMIN HCl (Tab) GLUCOPHAGE 1000 MG TAKE 1 TABLET BY MOUTH TWICE DAILY WITH MEALS        Metoprolol Tartrate (Tab) LOPRESSOR 100 MG Take 0.5 Tabs by mouth 2 times a day.        Multiple Vitamins-Minerals (Tab) CENTRUM  Take 1 Tab by mouth every day.        Multiple Vitamins-Minerals   Take  by mouth.        Oseltamivir Phosphate (Cap) TAMIFLU 75 MG Take 1 Cap by mouth 2 times a day.        Pravastatin Sodium (Tab) PRAVACHOL 40 MG TAKE 1 TAB BY MOUTH AT BEDTIME        Tadalafil (Tab) CIALIS 20 MG TAKE 1 TABLET BY MOUTH ASNEEDED        .                 Medicines prescribed today were sent to:     [id number] - [place] - [address]    [address] [place] [address]    Phone: [phone] Fax: [phone]    Open 24 Hours?: No      Medication refill instructions:       If your prescription bottle indicates you have medication refills left,  it is not necessary to call your provider’s office. Please contact your pharmacy and they will refill your medication.    If your prescription bottle indicates you do not have any refills left, you may request refills at any time through one of the following ways: The online [employer] system (except Urgent Care), by calling your provider’s office, or by asking your pharmacy to contact your provider’s office with a refill request. Medication refills are processed only during regular business hours and may not be available until the next business day. Your provider may request additional information or to have a follow-up visit with you prior to refilling your medication.   *Please Note: Medication refills are assigned a new Rx number when refilled electronically. Your pharmacy may indicate that no refills were authorized even though a new prescription for the same medication is available at the pharmacy. Please request the medicine by name with the pharmacy before contacting your provider for a refill.           [employer] Access Code: Activation code not generated  Current [employer] Status: Active

## 2017-05-19 NOTE — PROGRESS NOTES
Chief Complaint   Patient presents with   • Jaw Pain     Right side x 2 days        HISTORY OF PRESENT ILLNESS: Patient is a 56 y.o. male established patient who presents today due to right side jaw pain when he eats or open mouth. Pt reports that he is not sure if the pain is from inside or outside and not sure if he has the ear ache or not because it is very hard to tell due to the location. Pt does not have any fever or chills. No URI symptoms, no sinus pressure. He has headache but it happens before the jaw pain started about 2 days ago. He has been traveling for about a week and not sure if his headache is related to his traveling or not. The headache is mild, not getting worse.       Patient Active Problem List    Diagnosis Date Noted   • Stroke (CMS-HCC) 04/12/2016     Priority: High   • Obesity (BMI 30-39.9) 09/02/2016   • Essential hypertension 04/13/2016   • Family history of cerebral aneurysm 02/09/2016   • Type 2 diabetes mellitus without complication (CMS-HCC) 02/05/2016   • CLL (chronic lymphocytic leukemia) (CMS-HCC) 11/09/2015   • Low back pain radiating to right leg 12/03/2013   • Mixed hyperlipidemia 12/03/2013   • Hypertension 12/03/2013   • Vitamin D deficiency disease 12/03/2013   • Personal history of alcoholism 12/03/2013   • History of Amanda-en-Y gastric bypass 12/03/2013   • History of chronic pancreatitis 12/03/2013       Allergies:Pcn    Current Outpatient Prescriptions   Medication Sig Dispense Refill   • ibuprofen (MOTRIN) 200 MG Tab Take 200 mg by mouth every 6 hours as needed.     • pravastatin (PRAVACHOL) 40 MG tablet TAKE 1 TAB BY MOUTH AT BEDTIME 90 Tab 3   • chlorthalidone (HYGROTON) 25 MG Tab Take 1 Tab by mouth every day. 90 Tab 3   • glipiZIDE (GLUCOTROL) 5 MG Tab TAKE 1 TABLET BY MOUTH TWICE DAILY 180 Tab 3   • metformin (GLUCOPHAGE) 1000 MG tablet TAKE 1 TABLET BY MOUTH TWICE DAILY WITH MEALS 180 Tab 3   • losartan (COZAAR) 100 MG Tab TAKE 1 TAB BY MOUTH DAILY. 90 Tab 3   •  metoprolol (LOPRESSOR) 100 MG Tab Take 0.5 Tabs by mouth 2 times a day. 90 Tab 3   • clopidogrel (PLAVIX) 75 MG Tab Take 1 Tab by mouth every day. 90 Tab 3   • diphenhydrAMINE (BENADRYL) 25 MG Tab Take 50 mg by mouth every 6 hours as needed for Sleep.     • Loratadine (CLARITIN PO) Take  by mouth.     • Cyanocobalamin (VITAMIN B-12 PO) Take  by mouth.     • Cholecalciferol (VITAMIN D3) 1000 UNIT TABS Take 2 Tabs by mouth every day. 100 Tab 3   • Multiple Vitamins-Minerals (CENTRUM) TABS Take 1 Tab by mouth every day. 100 Tab 3   • cephALEXin (KEFLEX) 500 MG Cap Take 1 Cap by mouth 4 times a day. 28 Cap 0   • azithromycin (ZITHROMAX) 250 MG Tab Two day one then qd x 4. 6 Tab 0   • oseltamivir (TAMIFLU) 75 MG Cap Take 1 Cap by mouth 2 times a day. 10 Cap 0   • celecoxib (CELEBREX) 200 MG Cap Take 1 Cap by mouth 2 times a day. 60 Cap 3   • Blood Glucose Monitoring Suppl SUPPLIES Misc Test strips order: Test strips for One Touch Ultra 2 meter. Sig: use qd and prn ssx high or low sugar #100 RF x 1 100 Strip 1   • tadalafil (CIALIS) 20 MG tablet TAKE 1 TABLET BY MOUTH ASNEEDED 18 Tab 3   • albuterol (VENTOLIN OR PROVENTIL) 108 (90 BASE) MCG/ACT Aero Soln inhalation aerosol Inhale 2 Puffs by mouth every 6 hours as needed for Shortness of Breath. 8.5 g 3   • Multiple Vitamins-Minerals (MULTIVITAMIN PO) Take  by mouth.     • Blood Glucose Monitoring Suppl SUPPLIES MISC Test strips order: Test strips for One Touch Ultra 2 meter. Sig: use daily and prn ssx high or low sugar #100 RF x 3  Dx 250.00 100 Each 3     No current facility-administered medications for this visit.       Social History   Substance Use Topics   • Smoking status: Never Smoker    • Smokeless tobacco: Never Used   • Alcohol Use: No      Comment: Past history of alcoholism--last drink was --attends Recovery Meetings at his [employer]       Family Status   Relation Status Death Age   • Mother  72     alcoholism   • Father  52     cerebral  "aneurysm   • Sister     • Sister Alive    • Sister Alive    • Son Alive    • Daughter Alive      Family History   Problem Relation Age of Onset   • Alcohol/Drug Mother    • Alcohol/Drug Father    • Stroke Father    • Stroke Sister    • GI Sister      cirrhosis   • Heart Disease Sister          ROS:  Review of Systems   Constitutional: Negative for fever and chills.   HENT: Positive for ear pain ( not sure if it is ear pain or jaw pain). Negative for congestion, hearing loss, sore throat and tinnitus.    Respiratory: Negative for cough, sputum production, shortness of breath and wheezing.    Cardiovascular: Negative for chest pain.   Gastrointestinal: Negative for nausea and vomiting.   Musculoskeletal:        Jaw pain   Skin: Negative for rash.   Neurological: Positive for headaches (has been having headache before jaw pain started). Negative for dizziness.        Objective:     Blood pressure 120/70, pulse 86, temperature 36.6 °C (97.8 °F), resp. rate 16, height 1.88 m (6' 2\"), weight 100.245 kg (221 lb), SpO2 94 %.     Physical Exam:  Physical Exam   Constitutional: He is oriented to person, place, and time and well-developed, well-nourished, and in no distress.   HENT:   Head: Normocephalic and atraumatic.   Right Ear: Hearing, tympanic membrane and external ear normal.   Left Ear: Hearing, tympanic membrane and external ear normal.   Nose: Right sinus exhibits no maxillary sinus tenderness and no frontal sinus tenderness. Left sinus exhibits no maxillary sinus tenderness and no frontal sinus tenderness.   Mouth/Throat: Oropharynx is clear and moist. No oropharyngeal exudate, posterior oropharyngeal edema, posterior oropharyngeal erythema or tonsillar abscesses.   Eyes: Conjunctivae are normal.   Neck: Neck supple. No JVD present.   Cardiovascular: Normal rate.    Pulmonary/Chest: Effort normal.   Abdominal: Soft.   Musculoskeletal: Normal range of motion. He exhibits tenderness (mild tenderness to TM " joint area, right side but very mild. No swelling, no redness, no sign of infection. ). He exhibits no edema.   Neurological: He is alert and oriented to person, place, and time.   Skin: Skin is warm. No erythema.   Vitals reviewed.        Assessment/Plan:  1. Jaw pain  - No sign of infection, no abscess seen, possible TMD. Instructed pt to go gentle message, can try OTC NSAIDs, monitor for any sign of infection and call or RTC for any new symptoms or worsening symptoms. Will consider xray and cover with abx if indicated.     Differential diagnoses and indications for immediate follow-up discussed with patient.    Instructed to return to clinic or nearest emergency department for any change in condition, further concerns, or worsening of symptoms.    The patient demonstrated a good understanding and agreed with the treatment plan.

## 2017-05-24 DIAGNOSIS — H66.91 OTITIS, RIGHT: ICD-10-CM

## 2017-05-24 RX ORDER — DOXYCYCLINE HYCLATE 100 MG
100 TABLET ORAL 2 TIMES DAILY
Qty: 10 TAB | Refills: 0 | Status: SHIPPED | OUTPATIENT
Start: 2017-05-24 | End: 2017-05-28

## 2017-05-28 ENCOUNTER — HOSPITAL ENCOUNTER (INPATIENT)
Facility: MEDICAL CENTER | Age: 57
LOS: 4 days | DRG: 383 | End: 2017-06-01
Attending: EMERGENCY MEDICINE | Admitting: INTERNAL MEDICINE
Payer: COMMERCIAL

## 2017-05-28 ENCOUNTER — APPOINTMENT (OUTPATIENT)
Dept: RADIOLOGY | Facility: MEDICAL CENTER | Age: 57
DRG: 383 | End: 2017-05-28
Attending: INTERNAL MEDICINE
Payer: COMMERCIAL

## 2017-05-28 ENCOUNTER — APPOINTMENT (OUTPATIENT)
Dept: RADIOLOGY | Facility: MEDICAL CENTER | Age: 57
DRG: 383 | End: 2017-05-28
Attending: EMERGENCY MEDICINE
Payer: COMMERCIAL

## 2017-05-28 ENCOUNTER — RESOLUTE PROFESSIONAL BILLING HOSPITAL PROF FEE (OUTPATIENT)
Dept: HOSPITALIST | Facility: MEDICAL CENTER | Age: 57
End: 2017-05-28
Payer: COMMERCIAL

## 2017-05-28 DIAGNOSIS — K85.90 ACUTE PANCREATITIS, UNSPECIFIED COMPLICATION STATUS, UNSPECIFIED PANCREATITIS TYPE: ICD-10-CM

## 2017-05-28 PROBLEM — K83.8 DILATED CBD, ACQUIRED: Status: ACTIVE | Noted: 2017-05-28

## 2017-05-28 LAB
ALBUMIN SERPL BCP-MCNC: 4.3 G/DL (ref 3.2–4.9)
ALBUMIN/GLOB SERPL: 1.4 G/DL
ALP SERPL-CCNC: 89 U/L (ref 30–99)
ALT SERPL-CCNC: 15 U/L (ref 2–50)
ANION GAP SERPL CALC-SCNC: 10 MMOL/L (ref 0–11.9)
AST SERPL-CCNC: 18 U/L (ref 12–45)
BASOPHILS # BLD AUTO: 0 % (ref 0–1.8)
BASOPHILS # BLD: 0 K/UL (ref 0–0.12)
BILIRUB SERPL-MCNC: 0.9 MG/DL (ref 0.1–1.5)
BUN SERPL-MCNC: 17 MG/DL (ref 8–22)
CALCIUM SERPL-MCNC: 9.2 MG/DL (ref 8.4–10.2)
CHLORIDE SERPL-SCNC: 99 MMOL/L (ref 96–112)
CO2 SERPL-SCNC: 26 MMOL/L (ref 20–33)
CREAT SERPL-MCNC: 1.09 MG/DL (ref 0.5–1.4)
EKG IMPRESSION: NORMAL
EOSINOPHIL # BLD AUTO: 0.11 K/UL (ref 0–0.51)
EOSINOPHIL NFR BLD: 1 % (ref 0–6.9)
ERYTHROCYTE [DISTWIDTH] IN BLOOD BY AUTOMATED COUNT: 36.8 FL (ref 35.9–50)
GFR SERPL CREATININE-BSD FRML MDRD: >60 ML/MIN/1.73 M 2
GLOBULIN SER CALC-MCNC: 3 G/DL (ref 1.9–3.5)
GLUCOSE BLD-MCNC: 242 MG/DL (ref 65–99)
GLUCOSE SERPL-MCNC: 225 MG/DL (ref 65–99)
HCT VFR BLD AUTO: 36.9 % (ref 42–52)
HGB BLD-MCNC: 13.2 G/DL (ref 14–18)
LIPASE SERPL-CCNC: 89 U/L (ref 7–58)
LYMPHOCYTES # BLD AUTO: 4.84 K/UL (ref 1–4.8)
LYMPHOCYTES NFR BLD: 44 % (ref 22–41)
MANUAL DIFF BLD: NORMAL
MCH RBC QN AUTO: 29.5 PG (ref 27–33)
MCHC RBC AUTO-ENTMCNC: 35.8 G/DL (ref 33.7–35.3)
MCV RBC AUTO: 82.4 FL (ref 81.4–97.8)
MONOCYTES # BLD AUTO: 0.55 K/UL (ref 0–0.85)
MONOCYTES NFR BLD AUTO: 5 % (ref 0–13.4)
NEUTROPHILS # BLD AUTO: 5.5 K/UL (ref 1.82–7.42)
NEUTROPHILS NFR BLD: 50 % (ref 44–72)
NRBC # BLD AUTO: 0 K/UL
NRBC BLD AUTO-RTO: 0 /100 WBC
PLATELET # BLD AUTO: 130 K/UL (ref 164–446)
PMV BLD AUTO: 11 FL (ref 9–12.9)
POTASSIUM SERPL-SCNC: 3.6 MMOL/L (ref 3.6–5.5)
PROT SERPL-MCNC: 7.3 G/DL (ref 6–8.2)
RBC # BLD AUTO: 4.48 M/UL (ref 4.7–6.1)
SODIUM SERPL-SCNC: 135 MMOL/L (ref 135–145)
TRIGL SERPL-MCNC: 101 MG/DL (ref 0–149)
TROPONIN I SERPL-MCNC: <0.02 NG/ML (ref 0–0.04)
WBC # BLD AUTO: 11 K/UL (ref 4.8–10.8)

## 2017-05-28 PROCEDURE — 700105 HCHG RX REV CODE 258: Performed by: EMERGENCY MEDICINE

## 2017-05-28 PROCEDURE — 99285 EMERGENCY DEPT VISIT HI MDM: CPT

## 2017-05-28 PROCEDURE — 84478 ASSAY OF TRIGLYCERIDES: CPT

## 2017-05-28 PROCEDURE — 83690 ASSAY OF LIPASE: CPT

## 2017-05-28 PROCEDURE — 80053 COMPREHEN METABOLIC PANEL: CPT

## 2017-05-28 PROCEDURE — 84484 ASSAY OF TROPONIN QUANT: CPT

## 2017-05-28 PROCEDURE — 93005 ELECTROCARDIOGRAM TRACING: CPT

## 2017-05-28 PROCEDURE — A9270 NON-COVERED ITEM OR SERVICE: HCPCS | Performed by: INTERNAL MEDICINE

## 2017-05-28 PROCEDURE — C9113 INJ PANTOPRAZOLE SODIUM, VIA: HCPCS | Performed by: INTERNAL MEDICINE

## 2017-05-28 PROCEDURE — 85027 COMPLETE CBC AUTOMATED: CPT

## 2017-05-28 PROCEDURE — 700102 HCHG RX REV CODE 250 W/ 637 OVERRIDE(OP): Performed by: INTERNAL MEDICINE

## 2017-05-28 PROCEDURE — 700105 HCHG RX REV CODE 258: Performed by: INTERNAL MEDICINE

## 2017-05-28 PROCEDURE — 76705 ECHO EXAM OF ABDOMEN: CPT

## 2017-05-28 PROCEDURE — 700101 HCHG RX REV CODE 250: Performed by: INTERNAL MEDICINE

## 2017-05-28 PROCEDURE — 770006 HCHG ROOM/CARE - MED/SURG/GYN SEMI*

## 2017-05-28 PROCEDURE — 85007 BL SMEAR W/DIFF WBC COUNT: CPT

## 2017-05-28 PROCEDURE — 700111 HCHG RX REV CODE 636 W/ 250 OVERRIDE (IP)

## 2017-05-28 PROCEDURE — 36415 COLL VENOUS BLD VENIPUNCTURE: CPT

## 2017-05-28 PROCEDURE — 96361 HYDRATE IV INFUSION ADD-ON: CPT

## 2017-05-28 PROCEDURE — 82962 GLUCOSE BLOOD TEST: CPT

## 2017-05-28 PROCEDURE — 96374 THER/PROPH/DIAG INJ IV PUSH: CPT

## 2017-05-28 PROCEDURE — 700102 HCHG RX REV CODE 250 W/ 637 OVERRIDE(OP)

## 2017-05-28 PROCEDURE — 99223 1ST HOSP IP/OBS HIGH 75: CPT | Performed by: INTERNAL MEDICINE

## 2017-05-28 PROCEDURE — 74181 MRI ABDOMEN W/O CONTRAST: CPT

## 2017-05-28 PROCEDURE — 96376 TX/PRO/DX INJ SAME DRUG ADON: CPT

## 2017-05-28 PROCEDURE — 700111 HCHG RX REV CODE 636 W/ 250 OVERRIDE (IP): Performed by: EMERGENCY MEDICINE

## 2017-05-28 PROCEDURE — 96375 TX/PRO/DX INJ NEW DRUG ADDON: CPT

## 2017-05-28 PROCEDURE — 700111 HCHG RX REV CODE 636 W/ 250 OVERRIDE (IP): Performed by: INTERNAL MEDICINE

## 2017-05-28 RX ORDER — LORATADINE 10 MG/1
10 TABLET ORAL DAILY
COMMUNITY

## 2017-05-28 RX ORDER — PROMETHAZINE HYDROCHLORIDE 25 MG/1
12.5-25 SUPPOSITORY RECTAL EVERY 4 HOURS PRN
Status: DISCONTINUED | OUTPATIENT
Start: 2017-05-28 | End: 2017-06-01 | Stop reason: HOSPADM

## 2017-05-28 RX ORDER — CLOPIDOGREL BISULFATE 75 MG/1
75 TABLET ORAL DAILY
Status: DISCONTINUED | OUTPATIENT
Start: 2017-05-29 | End: 2017-06-01 | Stop reason: HOSPADM

## 2017-05-28 RX ORDER — ONDANSETRON 4 MG/1
4 TABLET, ORALLY DISINTEGRATING ORAL EVERY 4 HOURS PRN
Status: DISCONTINUED | OUTPATIENT
Start: 2017-05-28 | End: 2017-06-01 | Stop reason: HOSPADM

## 2017-05-28 RX ORDER — BISACODYL 10 MG
10 SUPPOSITORY, RECTAL RECTAL
Status: DISCONTINUED | OUTPATIENT
Start: 2017-05-28 | End: 2017-06-01 | Stop reason: HOSPADM

## 2017-05-28 RX ORDER — PROMETHAZINE HYDROCHLORIDE 25 MG/1
12.5-25 TABLET ORAL EVERY 4 HOURS PRN
Status: DISCONTINUED | OUTPATIENT
Start: 2017-05-28 | End: 2017-06-01 | Stop reason: HOSPADM

## 2017-05-28 RX ORDER — PHENOL 1.4 %
1 AEROSOL, SPRAY (ML) MUCOUS MEMBRANE
COMMUNITY
End: 2019-06-05

## 2017-05-28 RX ORDER — LORATADINE 10 MG/1
10 TABLET ORAL DAILY
Status: DISCONTINUED | OUTPATIENT
Start: 2017-05-28 | End: 2017-06-01 | Stop reason: HOSPADM

## 2017-05-28 RX ORDER — SODIUM CHLORIDE 9 MG/ML
INJECTION, SOLUTION INTRAVENOUS CONTINUOUS
Status: DISCONTINUED | OUTPATIENT
Start: 2017-05-28 | End: 2017-05-31

## 2017-05-28 RX ORDER — DOXYCYCLINE HYCLATE 100 MG
100 TABLET ORAL 2 TIMES DAILY
Status: ON HOLD | COMMUNITY
Start: 2017-05-24 | End: 2017-06-01

## 2017-05-28 RX ORDER — ACETAMINOPHEN 160 MG
1 TABLET,DISINTEGRATING ORAL DAILY
COMMUNITY

## 2017-05-28 RX ORDER — ONDANSETRON 2 MG/ML
4 INJECTION INTRAMUSCULAR; INTRAVENOUS EVERY 4 HOURS PRN
Status: DISCONTINUED | OUTPATIENT
Start: 2017-05-28 | End: 2017-06-01 | Stop reason: HOSPADM

## 2017-05-28 RX ORDER — POLYETHYLENE GLYCOL 3350 17 G/17G
1 POWDER, FOR SOLUTION ORAL
Status: DISCONTINUED | OUTPATIENT
Start: 2017-05-28 | End: 2017-06-01 | Stop reason: HOSPADM

## 2017-05-28 RX ORDER — METOPROLOL TARTRATE 100 MG/1
100 TABLET ORAL DAILY
COMMUNITY
End: 2018-11-26

## 2017-05-28 RX ORDER — OXYCODONE HYDROCHLORIDE 5 MG/1
5 TABLET ORAL
Status: DISCONTINUED | OUTPATIENT
Start: 2017-05-28 | End: 2017-06-01 | Stop reason: HOSPADM

## 2017-05-28 RX ORDER — OXYCODONE HYDROCHLORIDE 10 MG/1
10 TABLET ORAL
Status: DISCONTINUED | OUTPATIENT
Start: 2017-05-28 | End: 2017-06-01 | Stop reason: HOSPADM

## 2017-05-28 RX ORDER — PANTOPRAZOLE SODIUM 40 MG/10ML
40 INJECTION, POWDER, LYOPHILIZED, FOR SOLUTION INTRAVENOUS DAILY
Status: DISCONTINUED | OUTPATIENT
Start: 2017-05-28 | End: 2017-06-01 | Stop reason: HOSPADM

## 2017-05-28 RX ORDER — LOSARTAN POTASSIUM 25 MG/1
100 TABLET ORAL DAILY
Status: DISCONTINUED | OUTPATIENT
Start: 2017-05-28 | End: 2017-06-01 | Stop reason: HOSPADM

## 2017-05-28 RX ORDER — SODIUM CHLORIDE 9 MG/ML
1000 INJECTION, SOLUTION INTRAVENOUS ONCE
Status: COMPLETED | OUTPATIENT
Start: 2017-05-28 | End: 2017-05-28

## 2017-05-28 RX ORDER — HYDROMORPHONE HYDROCHLORIDE 2 MG/ML
0.5 INJECTION, SOLUTION INTRAMUSCULAR; INTRAVENOUS; SUBCUTANEOUS
Status: COMPLETED | OUTPATIENT
Start: 2017-05-28 | End: 2017-05-28

## 2017-05-28 RX ORDER — METOPROLOL SUCCINATE 100 MG/1
100 TABLET, EXTENDED RELEASE ORAL
Status: DISCONTINUED | OUTPATIENT
Start: 2017-05-28 | End: 2017-06-01 | Stop reason: HOSPADM

## 2017-05-28 RX ORDER — ONDANSETRON 2 MG/ML
4 INJECTION INTRAMUSCULAR; INTRAVENOUS
Status: DISCONTINUED | OUTPATIENT
Start: 2017-05-28 | End: 2017-05-28

## 2017-05-28 RX ORDER — PHENOL 1.4 %
1 AEROSOL, SPRAY (ML) MUCOUS MEMBRANE
Status: DISCONTINUED | OUTPATIENT
Start: 2017-05-28 | End: 2017-05-28

## 2017-05-28 RX ORDER — TADALAFIL 20 MG/1
20 TABLET ORAL PRN
COMMUNITY
End: 2017-06-02

## 2017-05-28 RX ORDER — CHOLECALCIFEROL (VITAMIN D3) 125 MCG
500 CAPSULE ORAL DAILY
Status: DISCONTINUED | OUTPATIENT
Start: 2017-05-29 | End: 2017-06-01 | Stop reason: HOSPADM

## 2017-05-28 RX ORDER — AMOXICILLIN 250 MG
2 CAPSULE ORAL 2 TIMES DAILY
Status: DISCONTINUED | OUTPATIENT
Start: 2017-05-28 | End: 2017-06-01 | Stop reason: HOSPADM

## 2017-05-28 RX ORDER — CIPROFLOXACIN 2 MG/ML
400 INJECTION, SOLUTION INTRAVENOUS EVERY 12 HOURS
Status: DISCONTINUED | OUTPATIENT
Start: 2017-05-28 | End: 2017-05-31

## 2017-05-28 RX ORDER — CHLORTHALIDONE 25 MG/1
25 TABLET ORAL DAILY
Status: DISCONTINUED | OUTPATIENT
Start: 2017-05-28 | End: 2017-06-01 | Stop reason: HOSPADM

## 2017-05-28 RX ORDER — ONDANSETRON 2 MG/ML
INJECTION INTRAMUSCULAR; INTRAVENOUS
Status: COMPLETED
Start: 2017-05-28 | End: 2017-05-28

## 2017-05-28 RX ORDER — PRAVASTATIN SODIUM 20 MG
40 TABLET ORAL
Status: DISCONTINUED | OUTPATIENT
Start: 2017-05-28 | End: 2017-06-01 | Stop reason: HOSPADM

## 2017-05-28 RX ADMIN — PANTOPRAZOLE SODIUM 40 MG: 40 INJECTION, POWDER, FOR SOLUTION INTRAVENOUS at 18:27

## 2017-05-28 RX ADMIN — HYDROMORPHONE HYDROCHLORIDE 0.5 MG: 1 INJECTION, SOLUTION INTRAMUSCULAR; INTRAVENOUS; SUBCUTANEOUS at 22:19

## 2017-05-28 RX ADMIN — CIPROFLOXACIN 400 MG: 2 INJECTION, SOLUTION INTRAVENOUS at 18:27

## 2017-05-28 RX ADMIN — HYDROMORPHONE HYDROCHLORIDE 0.5 MG: 1 INJECTION, SOLUTION INTRAMUSCULAR; INTRAVENOUS; SUBCUTANEOUS at 11:31

## 2017-05-28 RX ADMIN — METRONIDAZOLE 500 MG: 500 INJECTION, SOLUTION INTRAVENOUS at 22:31

## 2017-05-28 RX ADMIN — LORATADINE 10 MG: 10 TABLET ORAL at 18:27

## 2017-05-28 RX ADMIN — OXYCODONE HYDROCHLORIDE 10 MG: 10 TABLET ORAL at 23:44

## 2017-05-28 RX ADMIN — LOSARTAN POTASSIUM 100 MG: 25 TABLET, FILM COATED ORAL at 18:26

## 2017-05-28 RX ADMIN — STANDARDIZED SENNA CONCENTRATE AND DOCUSATE SODIUM 2 TABLET: 8.6; 5 TABLET, FILM COATED ORAL at 22:31

## 2017-05-28 RX ADMIN — HYDROMORPHONE HYDROCHLORIDE 0.5 MG: 2 INJECTION, SOLUTION INTRAMUSCULAR; INTRAVENOUS; SUBCUTANEOUS at 12:46

## 2017-05-28 RX ADMIN — CHLORTHALIDONE 25 MG: 25 TABLET ORAL at 18:26

## 2017-05-28 RX ADMIN — HYDROMORPHONE HYDROCHLORIDE 0.5 MG: 1 INJECTION, SOLUTION INTRAMUSCULAR; INTRAVENOUS; SUBCUTANEOUS at 17:51

## 2017-05-28 RX ADMIN — METOPROLOL SUCCINATE 100 MG: 100 TABLET, EXTENDED RELEASE ORAL at 22:30

## 2017-05-28 RX ADMIN — SODIUM CHLORIDE 1000 ML: 9 INJECTION, SOLUTION INTRAVENOUS at 11:32

## 2017-05-28 RX ADMIN — VITAMIN D, TAB 1000IU (100/BT) 1000 UNITS: 25 TAB at 18:26

## 2017-05-28 RX ADMIN — ONDANSETRON 4 MG: 2 INJECTION INTRAMUSCULAR; INTRAVENOUS at 11:32

## 2017-05-28 RX ADMIN — SODIUM CHLORIDE: 9 INJECTION, SOLUTION INTRAVENOUS at 17:52

## 2017-05-28 RX ADMIN — PRAVASTATIN SODIUM 40 MG: 20 TABLET ORAL at 22:31

## 2017-05-28 RX ADMIN — OXYCODONE HYDROCHLORIDE 10 MG: 10 TABLET ORAL at 18:26

## 2017-05-28 RX ADMIN — ONDANSETRON 4 MG: 2 INJECTION INTRAMUSCULAR; INTRAVENOUS at 17:52

## 2017-05-28 ASSESSMENT — PAIN SCALES - GENERAL
PAINLEVEL_OUTOF10: 8
PAINLEVEL_OUTOF10: 7
PAINLEVEL_OUTOF10: 8
PAINLEVEL_OUTOF10: 8

## 2017-05-28 ASSESSMENT — LIFESTYLE VARIABLES
ALCOHOL_USE: NO
EVER_SMOKED: NEVER

## 2017-05-28 NOTE — ED NOTES
"Med rec updated and complete  Allergies reviewed  Pt states \"I'm on METOPROLOL LOPRESSOR 100MG, I should be taking 50MG twice a day\".  \"I take 100MG at bedtime, my doctor told me it was ok to take the 100MG once a day\".      "

## 2017-05-28 NOTE — ED PROVIDER NOTES
"ED Provider Note    CHIEF COMPLAINT  Chief Complaint   Patient presents with   • Abdominal Pain       HPI  Gil Hart is a 57 y.o. male who presents with a chief complaint to me of \"I think my pancreatitis is back.\" The patient has a history of chronic pancreatitis in the setting of alcoholism. He has not had a drink for perhaps 7 years. He was having quite a bit of discomfort, has had pain management to include a celiac plexus block. That was 5 years ago. He has not had any issues since that time. Over last 3 days or so, he has had increasing discomfort in his abdomen. It is epigastric, intermittently radiates to the back. Nausea but no vomiting. No change in bowel or bladder. He has not had any alcohol. He did get started on some doxycycline the other day for an ear infection, and the symptoms began around that time. He is not sure whether this might be related. No fever. No chest pain or shortness of breath. There is no other complaint.    PAST MEDICAL HISTORY  Past Medical History   Diagnosis Date   • Hypertension    • Diabetes    • Cancer (CMS-HCC)      CLL--sees Dr. Moreira   • Obesity (BMI 30-39.9) 9/2/2016       FAMILY HISTORY  Family History   Problem Relation Age of Onset   • Alcohol/Drug Mother    • Alcohol/Drug Father    • Stroke Father    • Stroke Sister    • GI Sister      cirrhosis   • Heart Disease Sister        SOCIAL HISTORY  Social History   Substance Use Topics   • Smoking status: Never Smoker    • Smokeless tobacco: Never Used   • Alcohol Use: No      Comment: Past history of alcoholism--last drink was 2010--attends Recovery Meetings at his [employer]     He is here with his wife.    SURGICAL HISTORY  Past Surgical History   Procedure Laterality Date   • Gastric bypass laparoscopic  2001     Roen Y   • Hand surgery  2006     removal of ganglion cyst left hand   • Bone spur excision  2008     right ankle bone spur removal   • Laminotomy  2010     X Stop   • Lumbar fusion posterior  2012     L3-S1 " "  • Celiac plexus neurolysis       intermittent when necessary chronic pancreatic pains       CURRENT MEDICATIONS    I reviewed the nursing notes and/or the list of the patient's medications.    ALLERGIES  Allergies   Allergen Reactions   • Pcn [Penicillins]        REVIEW OF SYSTEMS  See HPI for further details. Review of systems as above, otherwise all other systems are negative.     PHYSICAL EXAM  VITAL SIGNS: /97 mmHg  Pulse 78  Temp(Src) 36.9 °C (98.4 °F)  Resp 16  Ht 1.88 m (6' 2.02\")  Wt 99.3 kg (218 lb 14.7 oz)  BMI 28.10 kg/m2  SpO2 99%  Constitutional: Oakman's and comfortable he is not toxic, nor ill in appearance.  HENT: Mucus membranes moist.  Oropharynx is clear.  Eyes: Pupils equally round.  No scleral icterus.   Neck: Full nontender range of motion.  Cardiovascular: Regular heart rate and rhythm.  No murmurs, rubs, nor gallop appreciated.   Thorax & Lungs: Chest is nontender.  Lungs are clear to auscultation with good air movement bilaterally.  No wheeze, rhonchi, nor rales.   Abdomen: Bowel sounds normal. Soft, with epigastric tenderness.  No rebound nor guarding.  No mass, pulsatile mass, nor hepatosplenomegaly appreciated.  No CVA tenderness. Equivocal clinical Doll sign.  : Deferred  Skin: No purpura nor petechia noted.  Extremities/Musculoskeletal: No sign of trauma.  Calves are nontender with no cords nor edema.  Neurologic: Alert & oriented.  Strength and sensation is intact all around.  Gait is normal.  Psychiatric: Normal affect appropriate for the clinical situation.    EKG  I interpreted this EKG myself.  This is a 12-lead study.  The rhythm is sinus with a rate of 70.  There are no ST segment nor T wave abnormalities.  Interpretation: No ST segment elevation myocardial infarction.    LABS  Labs Reviewed   CBC WITH DIFFERENTIAL - Abnormal; Notable for the following:     WBC 11.0 (*)     RBC 4.48 (*)     Hemoglobin 13.2 (*)     Hematocrit 36.9 (*)     MCHC 35.8 (*)     " Platelet Count 130 (*)     Lymphocytes 44.00 (*)     Lymphs (Absolute) 4.84 (*)     All other components within normal limits   COMP METABOLIC PANEL - Abnormal; Notable for the following:     Glucose 225 (*)     All other components within normal limits   LIPASE - Abnormal; Notable for the following:     Lipase 89 (*)     All other components within normal limits   TROPONIN   ESTIMATED GFR   DIFFERENTIAL MANUAL         RADIOLOGY/PROCEDURES  I have reviewed the patient's films myself, and they are read out by the radiologist as:   US-GALLBLADDER   Final Result      1.  Mild dilatation of the common bile duct without choledocholithiasis identified. If clinically indicated, MRCP may be helpful for further evaluation.      2.  Heterogeneous pancreatic echotexture may be related to the patient's history of pancreatitis.        .    MEDICAL RECORD  I have reviewed patient's medical record and pertinent results are listed above.    COURSE & MEDICAL DECISION MAKING  This patient presents with abdominal pain. Given IV fluids and pain medicine, antiemetics. His workup as noted above does show mild leukocytosis. The significance of this is unclear as he does have CLL. He does not have an elevation of his liver function tests. He does have a mildly elevated lipase level. With the patient endorses chronic pancreatitis, I suspect that this is a significant finding. I have low suspicion for cardiac etiology, and in unremarkable EKG as well as a normal troponin with days of symptoms at this. Down as noted above does show mild dilatation of the common bile duct. However there are no stones seen. At this point the patient is reexamined, he is , still uncomfortable. For this reason we'll put him in the hospital. Ongoing IV fluids, parenteral antiemetics and analgesics. I discussed the patient's case with Dr. Vera, who was seen the patient. He has asked for GI to be involved. I paged Dr. Weldon.      FINAL  IMPRESSION  1. Acute pancreatitis, unspecified complication status, unspecified pancreatitis type     2. Chronic pancreatitis secondary to alcoholism  3. Sober from alcohol for many years       This dictation was created using voice recognition software.     Electronically signed by: Kt Sargent, 5/28/2017 11:24 AM

## 2017-05-28 NOTE — IP AVS SNAPSHOT
" Home Care Instructions                                                                                                                  Name:Gil Hart  Medical Record Number:[id number]  CSN: [id number]    YOB: 1960   Age: 57 y.o.  Sex: male  HT:1.88 m (6' 2.02\") WT: 99.3 kg (218 lb 14.7 oz)          Admit Date: 5/28/2017     Discharge Date:   Today's Date: 6/1/2017  Attending Doctor:  Donna Garcia M.D.                  Allergies:  Pcn            Discharge Instructions       Discharge Instructions    Discharged to home by car with relative. Discharged via wheelchair, hospital escort: Yes.  Special equipment needed: Not Applicable    Be sure to schedule a follow-up appointment with your primary care doctor or any specialists as instructed.     Discharge Plan:   Influenza Vaccine Indication: Not indicated: Previously immunized this influenza season and > 8 years of age    I understand that a diet low in cholesterol, fat, and sodium is recommended for good health. Unless I have been given specific instructions below for another diet, I accept this instruction as my diet prescription.   Other diet: diabetic    Special Instructions: None    · Is patient discharged on Warfarin / Coumadin?   No     · Is patient Post Blood Transfusion?  No    Depression / Suicide Risk    As you are discharged from this [facility] facility, it is important to learn how to keep safe from harming yourself.    Recognize the warning signs:  · Abrupt changes in personality, positive or negative- including increase in energy   · Giving away possessions  · Change in eating patterns- significant weight changes-  positive or negative  · Change in sleeping patterns- unable to sleep or sleeping all the time   · Unwillingness or inability to communicate  · Depression  · Unusual sadness, discouragement and loneliness  · Talk of wanting to die  · Neglect of personal appearance   · Rebelliousness- reckless behavior  · Withdrawal from " people/activities they love  · Confusion- inability to concentrate     If you or a loved one observes any of these behaviors or has concerns about self-harm, here's what you can do:  · Talk about it- your feelings and reasons for harming yourself  · Remove any means that you might use to hurt yourself (examples: pills, rope, extension cords, firearm)  · Get professional help from the community (Mental Health, Substance Abuse, psychological counseling)  · Do not be alone:Call your Safe Contact- someone whom you trust who will be there for you.  · Call your local CRISIS HOTLINE 831-6924 or 401-754-3754  · Call your local Children's Mobile Crisis Response Team [place] (570) 033-9221 or www.[employer]  · Call the toll free National Suicide Prevention Hotlines   · National Suicide Prevention Lifeline 648-962-LQHV (7997)  · [employer] cc. Hope Line Network 800-SUICIDE (549-9573)        Follow-up Information     1. Follow up with Altaf Swenson D.O..    Specialty:  Internal Medicine    Contact information    [address] [facility] [address]  [address]  [place] [address] [phone]          2. Follow up with Noe Paniagua M.D..    Specialty:  Gastroenterology    Why:  Juno  left a message with your physician regarding need for follow up appointment. Please contact them directly if you do not hear back from them with in 1 day. Thank you    Contact information    [address] [place] [address] [phone]           Discharge Medication Instructions:    Below are the medications your physician expects you to take upon discharge:    Review all your home medications and newly ordered medications with your doctor and/or pharmacist. Follow medication instructions as directed by your doctor and/or pharmacist.    Please keep your medication list with you and share with your physician.               Medication List      START taking these medications        Instructions    Morning Afternoon Evening Bedtime     omeprazole 20 MG Tbec delayed-release tablet   Commonly known as:  PRILOSEC        Take 1 Tab by mouth every day for 30 days.   Dose:  1 Tab                          CONTINUE taking these medications        Instructions    Morning Afternoon Evening Bedtime    CENTRUM Tabs        Take 1 Tab by mouth every day.   Dose:  1 Tab                        chlorthalidone 25 MG Tabs   Last time this was given:  25 mg on 6/1/2017  9:40 AM   Commonly known as:  HYGROTON        Take 1 Tab by mouth every day.   Dose:  25 mg                        CIALIS 20 MG tablet   Generic drug:  tadalafil        Take 20 mg by mouth as needed for Erectile Dysfunction.   Dose:  20 mg                        clopidogrel 75 MG Tabs   Last time this was given:  75 mg on 6/1/2017  9:39 AM   Commonly known as:  PLAVIX        Take 1 Tab by mouth every day.   Dose:  75 mg                        glipiZIDE 5 MG Tabs   Commonly known as:  GLUCOTROL        TAKE 1 TABLET BY MOUTH TWICE DAILY                        loratadine 10 MG Tabs   Last time this was given:  10 mg on 6/1/2017  9:40 AM   Commonly known as:  CLARITIN        Take 10 mg by mouth every day.   Dose:  10 mg                        losartan 100 MG Tabs   Last time this was given:  100 mg on 6/1/2017  9:39 AM   Commonly known as:  COZAAR        TAKE 1 TAB BY MOUTH DAILY.                        Melatonin 10 MG Tabs        Take 1 Tab by mouth every bedtime.   Dose:  1 Tab                        metformin 1000 MG tablet   Commonly known as:  GLUCOPHAGE        TAKE 1 TABLET BY MOUTH TWICE DAILY WITH MEALS                        metoprolol 100 MG Tabs   Commonly known as:  LOPRESSOR        Take 100 mg by mouth every day.   Dose:  100 mg                        pravastatin 40 MG tablet   Last time this was given:  40 mg on 5/31/2017  9:06 PM   Commonly known as:  PRAVACHOL        TAKE 1 TAB BY MOUTH AT BEDTIME                        VITAMIN B-12 PO   Last time this was given:  500 mcg on 6/1/2017  9:39  AM        Take 1 Tab by mouth every day.   Dose:  1 Tab                        Vitamin D3 2000 UNIT Caps        Take 1 Cap by mouth every day.   Dose:  1 Cap                          STOP taking these medications     doxycycline 100 MG Tabs   Commonly known as:  VIBRAMYCIN               ibuprofen 200 MG Tabs   Commonly known as:  MOTRIN                    Where to Get Your Medications      These medications were sent to [id number] - [place], [place] - [address] [place] [address]  [address] [place] [address] [place] [address]     Phone:  [phone]    - omeprazole 20 MG Tbec delayed-release tablet            Instructions           Diet / Nutrition:    Follow any diet instructions given to you by your doctor or the dietician, including how much salt (sodium) you are allowed each day.    If you are overweight, talk to your doctor about a weight reduction plan.    Activity:    Remain physically active following your doctor's instructions about exercise and activity.    Rest often.     Any time you become even a little tired or short of breath, SIT DOWN and rest.    Worsening Symptoms:    Report any of the following signs and symptoms to the doctor's office immediately:    *Pain of jaw, arm, or neck  *Chest pain not relieved by medication                               *Dizziness or loss of consciousness  *Difficulty breathing even when at rest   *More tired than usual                                       *Bleeding drainage or swelling of surgical site  *Swelling of feet, ankles, legs or stomach                 *Fever (>100ºF)  *Pink or blood tinged sputum  *Weight gain (3lbs/day or 5lbs /week)           *Shock from internal defibrillator (if applicable)  *Palpitations or irregular heartbeats                *Cool and/or numb extremities    Stroke Awareness    Common Risk Factors for Stroke include:    Age  Atrial Fibrillation  Carotid Artery Stenosis  Diabetes Mellitus  Excessive alcohol consumption  High blood pressure  Overweight      Physical inactivity  Smoking    Warning signs and symptoms of a stroke include:    *Sudden numbness or weakness of the face, arm or leg (especially on one side of the body).  *Sudden confusion, trouble speaking or understanding.  *Sudden trouble seeing in one or both eyes.  *Sudden trouble walking, dizziness, loss of balance or coordination.Sudden severe headache with no known cause.    It is very important to get treatment quickly when a stroke occurs. If you experience any of the above warning signs, call 911 immediately.                   Disclaimer         Quit Smoking / Tobacco Use:    I understand the use of any tobacco products increases my chance of suffering from future heart disease or stroke and could cause other illnesses which may shorten my life. Quitting the use of tobacco products is the single most important thing I can do to improve my health. For further information on smoking / tobacco cessation call a Toll Free Quit Line at 1-541.522.7582 (*[employer] [place]) or 1-984.129.7628 (American Lung Association) or you can access the web based program at www.lungHairdressr.org.    [place] Tobacco Users Help Line:  (567) 542-4806       Toll Free: 1-313.495.7999  Quit Tobacco Program [facility] (292)737-1996    Crisis Hotline:    [place] Crisis Hotline:  6-271-XKRZZZY or 1-916.553.5755    [place] Crisis Hotline:    1-691.721.1981 or 766-303-9604    Discharge Survey:   Thank you for choosing [facility]. We hope we did everything we could to make your hospital stay a pleasant one. You may be receiving a phone survey and we would appreciate your time and participation in answering the questions. Your input is very valuable to us in our efforts to improve our service to our patients and their families.        My signature on this form indicates that:    1. I have reviewed and understand the above information.  2. My questions regarding this information have been answered to my  satisfaction.  3. I have formulated a plan with my discharge nurse to obtain my prescribed medications for home.                  Disclaimer         __________________________________                     __________       ________                       Patient Signature                                                 Date                    Time

## 2017-05-28 NOTE — IP AVS SNAPSHOT
6/1/2017    Gil Hart  [address] [place] [address]    Dear Gil:    [facility] wants to ensure your discharge home is safe and you or your loved ones have had all of your questions answered regarding your care after you leave the hospital.    Below is a list of resources and contact information should you have any questions regarding your hospital stay, follow-up instructions, or active medical symptoms.    Questions or Concerns Regarding… Contact   Medical Questions Related to Your Discharge  (7 days a week, 8am-5pm) Contact a Nurse Care Coordinator   [phone]   Medical Questions Not Related to Your Discharge  (24 hours a day / 7 days a week)  Contact the Nurse Health Line   [phone]    Medications or Discharge Instructions Refer to your discharge packet   or contact your [facility] Primary Care Provider   [phone]   Follow-up Appointment(s) Schedule your appointment via [employer]   or contact Scheduling [phone]   Billing Review your statement via [employer]  or contact Billing [phone]   Medical Records Review your records via [employer]   or contact Medical Records [phone]     You may receive a telephone call within two days of discharge. This call is to make certain you understand your discharge instructions and have the opportunity to have any questions answered. You can also easily access your medical information, test results and upcoming appointments via the [employer] free online health management tool. You can learn more and sign up at [employer]. For assistance setting up your [employer] account, please call [phone].    Once again, we want to ensure your discharge home is safe and that you have a clear understanding of any next steps in your care. If you have any questions or concerns, please do not hesitate to contact us, we are here for you. Thank you for choosing [facility] for your healthcare needs.    Sincerely,    Your [facility] Healthcare Team

## 2017-05-28 NOTE — ED NOTES
Pt to MRI via gurney. Will be transported directly to floor following MRI. Belongings and wife at bedside.

## 2017-05-28 NOTE — ED NOTES
"Ambulatory to triage. States, \"I think my chronic pancreatitis is back\". Mid abdominal pain x 3 days. Dull pain - at times shoot through to back. EKG called at 1023.   "

## 2017-05-29 PROBLEM — K86.1 CHRONIC PANCREATITIS (HCC): Status: ACTIVE | Noted: 2017-05-28

## 2017-05-29 PROBLEM — R10.9 ABDOMINAL PAIN: Status: ACTIVE | Noted: 2017-05-29

## 2017-05-29 PROBLEM — E87.1 HYPONATREMIA: Status: ACTIVE | Noted: 2017-05-29

## 2017-05-29 PROBLEM — D64.9 NORMOCYTIC NORMOCHROMIC ANEMIA: Status: ACTIVE | Noted: 2017-05-29

## 2017-05-29 LAB
ANION GAP SERPL CALC-SCNC: 5 MMOL/L (ref 0–11.9)
BUN SERPL-MCNC: 6 MG/DL (ref 8–22)
CALCIUM SERPL-MCNC: 8.4 MG/DL (ref 8.4–10.2)
CHLORIDE SERPL-SCNC: 97 MMOL/L (ref 96–112)
CO2 SERPL-SCNC: 29 MMOL/L (ref 20–33)
CREAT SERPL-MCNC: 0.83 MG/DL (ref 0.5–1.4)
ERYTHROCYTE [DISTWIDTH] IN BLOOD BY AUTOMATED COUNT: 37.2 FL (ref 35.9–50)
GFR SERPL CREATININE-BSD FRML MDRD: >60 ML/MIN/1.73 M 2
GLUCOSE BLD-MCNC: 184 MG/DL (ref 65–99)
GLUCOSE BLD-MCNC: 199 MG/DL (ref 65–99)
GLUCOSE BLD-MCNC: 205 MG/DL (ref 65–99)
GLUCOSE SERPL-MCNC: 192 MG/DL (ref 65–99)
HCT VFR BLD AUTO: 32.6 % (ref 42–52)
HGB BLD-MCNC: 11.5 G/DL (ref 14–18)
MCH RBC QN AUTO: 29.4 PG (ref 27–33)
MCHC RBC AUTO-ENTMCNC: 35.3 G/DL (ref 33.7–35.3)
MCV RBC AUTO: 83.4 FL (ref 81.4–97.8)
PLATELET # BLD AUTO: 109 K/UL (ref 164–446)
PMV BLD AUTO: 11.2 FL (ref 9–12.9)
POTASSIUM SERPL-SCNC: 3.6 MMOL/L (ref 3.6–5.5)
RBC # BLD AUTO: 3.91 M/UL (ref 4.7–6.1)
SODIUM SERPL-SCNC: 131 MMOL/L (ref 135–145)
WBC # BLD AUTO: 7.8 K/UL (ref 4.8–10.8)

## 2017-05-29 PROCEDURE — 36415 COLL VENOUS BLD VENIPUNCTURE: CPT

## 2017-05-29 PROCEDURE — 80048 BASIC METABOLIC PNL TOTAL CA: CPT

## 2017-05-29 PROCEDURE — 82962 GLUCOSE BLOOD TEST: CPT | Mod: 91

## 2017-05-29 PROCEDURE — 770006 HCHG ROOM/CARE - MED/SURG/GYN SEMI*

## 2017-05-29 PROCEDURE — 700102 HCHG RX REV CODE 250 W/ 637 OVERRIDE(OP): Performed by: INTERNAL MEDICINE

## 2017-05-29 PROCEDURE — 700111 HCHG RX REV CODE 636 W/ 250 OVERRIDE (IP): Performed by: INTERNAL MEDICINE

## 2017-05-29 PROCEDURE — 700101 HCHG RX REV CODE 250: Performed by: INTERNAL MEDICINE

## 2017-05-29 PROCEDURE — 99233 SBSQ HOSP IP/OBS HIGH 50: CPT | Performed by: HOSPITALIST

## 2017-05-29 PROCEDURE — 85027 COMPLETE CBC AUTOMATED: CPT

## 2017-05-29 PROCEDURE — C9113 INJ PANTOPRAZOLE SODIUM, VIA: HCPCS | Performed by: INTERNAL MEDICINE

## 2017-05-29 PROCEDURE — 700105 HCHG RX REV CODE 258: Performed by: INTERNAL MEDICINE

## 2017-05-29 PROCEDURE — A9270 NON-COVERED ITEM OR SERVICE: HCPCS | Performed by: INTERNAL MEDICINE

## 2017-05-29 RX ADMIN — HYDROMORPHONE HYDROCHLORIDE 0.5 MG: 1 INJECTION, SOLUTION INTRAMUSCULAR; INTRAVENOUS; SUBCUTANEOUS at 11:00

## 2017-05-29 RX ADMIN — HYDROMORPHONE HYDROCHLORIDE 0.5 MG: 1 INJECTION, SOLUTION INTRAMUSCULAR; INTRAVENOUS; SUBCUTANEOUS at 14:10

## 2017-05-29 RX ADMIN — OXYCODONE HYDROCHLORIDE 10 MG: 10 TABLET ORAL at 11:58

## 2017-05-29 RX ADMIN — METRONIDAZOLE 500 MG: 500 INJECTION, SOLUTION INTRAVENOUS at 23:03

## 2017-05-29 RX ADMIN — HYDROMORPHONE HYDROCHLORIDE 0.5 MG: 1 INJECTION, SOLUTION INTRAMUSCULAR; INTRAVENOUS; SUBCUTANEOUS at 04:31

## 2017-05-29 RX ADMIN — METOPROLOL SUCCINATE 100 MG: 100 TABLET, EXTENDED RELEASE ORAL at 20:38

## 2017-05-29 RX ADMIN — OXYCODONE HYDROCHLORIDE 10 MG: 10 TABLET ORAL at 08:56

## 2017-05-29 RX ADMIN — OXYCODONE HYDROCHLORIDE 10 MG: 10 TABLET ORAL at 05:56

## 2017-05-29 RX ADMIN — CIPROFLOXACIN 400 MG: 2 INJECTION, SOLUTION INTRAVENOUS at 08:57

## 2017-05-29 RX ADMIN — HYDROMORPHONE HYDROCHLORIDE 0.5 MG: 1 INJECTION, SOLUTION INTRAMUSCULAR; INTRAVENOUS; SUBCUTANEOUS at 17:13

## 2017-05-29 RX ADMIN — HYDROMORPHONE HYDROCHLORIDE 0.5 MG: 1 INJECTION, SOLUTION INTRAMUSCULAR; INTRAVENOUS; SUBCUTANEOUS at 07:43

## 2017-05-29 RX ADMIN — OXYCODONE HYDROCHLORIDE 10 MG: 10 TABLET ORAL at 02:51

## 2017-05-29 RX ADMIN — STANDARDIZED SENNA CONCENTRATE AND DOCUSATE SODIUM 2 TABLET: 8.6; 5 TABLET, FILM COATED ORAL at 20:38

## 2017-05-29 RX ADMIN — HYDROMORPHONE HYDROCHLORIDE 0.5 MG: 1 INJECTION, SOLUTION INTRAMUSCULAR; INTRAVENOUS; SUBCUTANEOUS at 01:22

## 2017-05-29 RX ADMIN — CLOPIDOGREL BISULFATE 75 MG: 75 TABLET, FILM COATED ORAL at 08:57

## 2017-05-29 RX ADMIN — HYDROMORPHONE HYDROCHLORIDE 0.5 MG: 1 INJECTION, SOLUTION INTRAMUSCULAR; INTRAVENOUS; SUBCUTANEOUS at 20:38

## 2017-05-29 RX ADMIN — CIPROFLOXACIN 400 MG: 2 INJECTION, SOLUTION INTRAVENOUS at 20:42

## 2017-05-29 RX ADMIN — PANTOPRAZOLE SODIUM 40 MG: 40 INJECTION, POWDER, FOR SOLUTION INTRAVENOUS at 08:58

## 2017-05-29 RX ADMIN — METRONIDAZOLE 500 MG: 500 INJECTION, SOLUTION INTRAVENOUS at 14:10

## 2017-05-29 RX ADMIN — SODIUM CHLORIDE: 9 INJECTION, SOLUTION INTRAVENOUS at 14:16

## 2017-05-29 RX ADMIN — ONDANSETRON 4 MG: 2 INJECTION INTRAMUSCULAR; INTRAVENOUS at 11:31

## 2017-05-29 RX ADMIN — PRAVASTATIN SODIUM 40 MG: 20 TABLET ORAL at 20:39

## 2017-05-29 RX ADMIN — METRONIDAZOLE 500 MG: 500 INJECTION, SOLUTION INTRAVENOUS at 05:46

## 2017-05-29 RX ADMIN — HYDROMORPHONE HYDROCHLORIDE 0.5 MG: 1 INJECTION, SOLUTION INTRAMUSCULAR; INTRAVENOUS; SUBCUTANEOUS at 23:41

## 2017-05-29 RX ADMIN — ONDANSETRON 4 MG: 2 INJECTION INTRAMUSCULAR; INTRAVENOUS at 05:44

## 2017-05-29 RX ADMIN — LOSARTAN POTASSIUM 100 MG: 25 TABLET, FILM COATED ORAL at 08:56

## 2017-05-29 RX ADMIN — CHLORTHALIDONE 25 MG: 25 TABLET ORAL at 08:57

## 2017-05-29 ASSESSMENT — ENCOUNTER SYMPTOMS
NERVOUS/ANXIOUS: 0
WEAKNESS: 1
DIARRHEA: 0
FOCAL WEAKNESS: 0
VOMITING: 0
PALPITATIONS: 0
ABDOMINAL PAIN: 1
DIZZINESS: 0
NEUROLOGICAL NEGATIVE: 1
RESPIRATORY NEGATIVE: 1
HEARTBURN: 0
FEVER: 0
PSYCHIATRIC NEGATIVE: 1
HEADACHES: 0
EYES NEGATIVE: 1
DIAPHORESIS: 0
SEIZURES: 0
WHEEZING: 0
MUSCULOSKELETAL NEGATIVE: 1
NAUSEA: 1
BRUISES/BLEEDS EASILY: 0
CONSTIPATION: 0
BLOOD IN STOOL: 0
DOUBLE VISION: 0
CHILLS: 0
CARDIOVASCULAR NEGATIVE: 1
LOSS OF CONSCIOUSNESS: 0
CONSTITUTIONAL NEGATIVE: 1
HEMOPTYSIS: 0
COUGH: 0

## 2017-05-29 ASSESSMENT — PAIN SCALES - GENERAL
PAINLEVEL_OUTOF10: 7
PAINLEVEL_OUTOF10: 8
PAINLEVEL_OUTOF10: 7
PAINLEVEL_OUTOF10: 6
PAINLEVEL_OUTOF10: 7

## 2017-05-29 NOTE — PROGRESS NOTES
Received report from NOC RN, assumed pt care. Pt A&Ox4, sitting up in bed. Pt c/o 8/10 pain to abdomen, medicated with dilaudid PRN see MAR. No c/o N/V at this time. Pt taught to inform nurse if experiencing pain above comfort goal, SOB, chest pain, ambulating out of bed, or for any further assistance. Fall precautions in place, call light within reach. Will continue with care.

## 2017-05-29 NOTE — PROGRESS NOTES
Hospital Medicine Progress Note, Adult, Complex               Author: Irvin Frias    Chief Complaint Today: 57 year old male admitted with abdominal pain Date & Time created: 5/29/2017  2:03 PM     Interval History:  Mr Hart is a 55 year old male with history of Amanda-En-Y anastomosis and he is thus developed abdominal pain and most likely has an ulcer in the anastomotic site. He has slight elevation of his lipase to 89, he has mild dilation of aprt of the common bile duct but all things look like chronic pancreatitis and GI did see him and he is pending a morning EGD. He can eat for now clear liquid diet and NPO at midnight.     Review of Systems:  Review of Systems   Constitutional: Negative for fever, chills and diaphoresis.   HENT: Negative.    Eyes: Negative.  Negative for double vision.   Respiratory: Negative.  Negative for cough, hemoptysis and wheezing.    Cardiovascular: Negative.  Negative for chest pain, palpitations and leg swelling.   Gastrointestinal: Positive for nausea and abdominal pain. Negative for heartburn, vomiting, diarrhea, constipation and blood in stool.   Genitourinary: Negative.  Negative for urgency, frequency and hematuria.   Musculoskeletal: Negative.  Negative for joint pain.   Skin: Negative.  Negative for itching and rash.   Neurological: Positive for weakness. Negative for dizziness, focal weakness, seizures, loss of consciousness and headaches.   Endo/Heme/Allergies: Negative.  Does not bruise/bleed easily.   Psychiatric/Behavioral: Negative.  Negative for suicidal ideas. The patient is not nervous/anxious.        Physical Exam:  Physical Exam   Constitutional: He is oriented to person, place, and time. He appears well-developed and well-nourished.   HENT:   Head: Normocephalic and atraumatic.   Right Ear: External ear normal.   Left Ear: External ear normal.   Nose: Nose normal.   Mouth/Throat: Oropharynx is clear and moist.   Eyes: Conjunctivae and EOM are normal. Pupils are  equal, round, and reactive to light.   Neck: Normal range of motion. Neck supple. No JVD present. No thyromegaly present.   Cardiovascular: Normal rate and regular rhythm.  Exam reveals no gallop and no friction rub.    No murmur heard.  Pulmonary/Chest: No respiratory distress. He has no wheezes. He has no rales. He exhibits no tenderness.   Abdominal: Soft. Bowel sounds are normal. He exhibits distension. He exhibits no mass. There is tenderness in the right upper quadrant and epigastric area. There is no rebound and no guarding.   Genitourinary:   King catheter   Musculoskeletal: Normal range of motion. He exhibits no edema or tenderness.   Lymphadenopathy:     He has no cervical adenopathy.   Neurological: He is alert and oriented to person, place, and time. He has normal reflexes. No cranial nerve deficit.   Skin: Skin is warm and dry. No rash noted. No erythema. No pallor.   Psychiatric: He has a normal mood and affect. His behavior is normal. Judgment and thought content normal.   Nursing note and vitals reviewed.      Labs:        Invalid input(s): [id number]  Recent Labs      05/28/17 1135   TROPONINI  <0.02     Recent Labs      05/28/17 1135 05/29/17 0446   SODIUM  135  131*   POTASSIUM  3.6  3.6   CHLORIDE  99  97   CO2  26  29   BUN  17  6*   CREATININE  1.09  0.83   CALCIUM  9.2  8.4     Recent Labs      05/28/17 1135 05/29/17 0446   ALTSGPT  15   --    ASTSGOT  18   --    ALKPHOSPHAT  89   --    TBILIRUBIN  0.9   --    LIPASE  89*   --    GLUCOSE  225*  192*     Recent Labs      05/28/17 1135 05/29/17 0446   RBC  4.48*  3.91*   HEMOGLOBIN  13.2*  11.5*   HEMATOCRIT  36.9*  32.6*   PLATELETCT  130*  109*     Recent Labs      05/28/17 1135 05/29/17 0446   WBC  11.0*  7.8   NEUTSPOLYS  50.00   --    LYMPHOCYTES  44.00*   --    MONOCYTES  5.00   --    EOSINOPHILS  1.00   --    BASOPHILS  0.00   --    ASTSGOT  18   --    ALTSGPT  15   --    ALKPHOSPHAT  89   --    TBILIRUBIN  0.9    --            Hemodynamics:  Temp (24hrs), Av.7 °C (98.1 °F), Min:36.5 °C (97.7 °F), Max:36.9 °C (98.4 °F)  Temperature: 36.8 °C (98.3 °F)  Pulse  Av.1  Min: 66  Max: 79   Blood Pressure: 150/83 mmHg, NIBP: 141/82 mmHg     Respiratory:    Respiration: 18, Pulse Oximetry: 90 %           Fluids:    Intake/Output Summary (Last 24 hours) at 17 1403  Last data filed at 17 1100   Gross per 24 hour   Intake   1250 ml   Output    150 ml   Net   1100 ml        GI/Nutrition:  Orders Placed This Encounter   Procedures   • DIET ORDER     Standing Status: Standing      Number of Occurrences: 1      Standing Expiration Date:      Order Specific Question:  Diet:     Answer:  Clear Liquid [10]     Order Specific Question:  Diet:     Answer:  Diabetic [3]     Medical Decision Making, by Problem:  Active Hospital Problems    Diagnosis   • *Abdominal pain acute [R10.9]  -suspect ulcer disease  -chronic pancreatitis is there and needs pain management as well   • Hyponatremia [E87.1]  -fluid management and monitor BS as he has shot up with his BS and sodium dropped   • Chronic pancreatitis (CMS-HCC) [K86.1]  -monitor for pain and hydrate   • Dilated cbd, acquired [K83.8]  -chronic in nature   • Type 2 diabetes mellitus without complication (CMS-HCC) [E11.9]  -accus with sliding scale coverage  -diabetic diet  -diabetic education  -follow glycohemoglobin levels long term  -continue with oral antihyperglycemics  -monitor for hypoglycemic episodes and adjust control if he should get low   • CLL (chronic lymphocytic leukemia) (CMS-HCC) [C91.10]  -on chemotherapy as outpatient   • Hypertension [I10]  -controlled at 139/72   • 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

## 2017-05-29 NOTE — H&P
"DATE OF SERVICE:  05/28/2017    YOB: 1960    PRIMARY CARE PHYSICIAN:  Altaf Swenson DO    CHIEF COMPLAINT:  Epigastric pain.    HISTORY OF PRESENT ILLNESS:  He is a 57-year-old male with a history of   chronic alcoholic pancreatitis, diabetes, who presents with epigastric pain.    This has been going on for 3 days now and that can be possibly related to   food.  The pain is located in the epigastric region and radiates to the back.    Although, he has a history of alcohol dependence, he has not had a drink for   7 years.  He has had on and off abdominal pain before and has been managing   his pancreatitis at home.  He also received the celiac plexus block in the   past.  He denied any unusual food, recent travel outside of [place], or   any sick contacts.  He denied any fevers or chills, only has occasional   nausea, but no vomiting.  His bowel movements have been regular.  He denied   any rash or dysuria.  Of note, he had been started on doxycycline for \"ear   infection.\"  He had received 2 days of that.  Currently, he does not have any   ear pain, respiratory symptoms, tinnitus or hearing loss.  He has been   discontinued on doxycycline presumably because of the concern that it can   exacerbate pancreatitis.  At the emergency room, he was afebrile and   hemodynamically stable.  The gallbladder was obtained, but that did show a   mild dilatation of the common bile duct without choledocholithiasis.  The   pancreatic echotexture is heterogenous and may be related to the patient's   history of pancreatitis.  He did have a mild leukocytosis on labs.  When I saw   him at the emergency room, he was in no acute distress.  He was still having   epigastric pain.  It is worsened on palpation of that area.  He was not   nauseated or having any bowel movements.  His wife is at bedside.  His   auscultation is clear.    MEDICATIONS:  Cholecalciferol, melatonin, doxycycline that has been stopped, "   loratadine, metoprolol, tadalafil, ibuprofen, pravastatin, chlorthalidone,   glipizide, metformin, losartan, clopidogrel, Cyanocobalamin, multivitamins.    ALLERGIES:  PENICILLIN.    PAST MEDICAL AND SURGICAL HISTORY:  Hypertension, diabetes, CLL, he sees Dr. Moreira, obesity, gastric bypass, hand surgery, bone excision, laminectomy,   posterior lumbar fusion, celiac plexus, neurolysis.    FAMILY HISTORY:  Alcohol in the mother, alcohol in the father,  stroke in the   father, stroke in a sister, GI in a sister, cirrhosis in a sister, heart   disease in a sister.    SOCIAL HISTORY:  Currently .  Denies any smoking, currently, no alcohol   use, last drink was in 2010, and he did complete. Alcohol recovery meetings.    Denies any illicit drug.    REVIEW OF SYSTEMS:  GENERAL:  No fevers or chills.  EYES:  No vision loss.  ENT:  No nasal congestion, epistaxis or hearing loss.  RESPIRATORY:  No shortness of breath, productive coughing, wheezing or   hemoptysis.  CARDIOVASCULAR:  No chest pain, palpitations, murmurs, claudication,   orthopnea, exertional dyspnea, or dyspnea on exertion.  GASTROINTESTINAL:  No nausea, vomiting, diarrhea or constipation.  Positive   for abdominal pain.  GENITOURINARY:  No dysuria or incontinence.  MUSCULOSKELETAL:  No falls, myalgias or arthralgias.  INTEGUMENTARY:  No new rashes.  HEMATOLOGIC:  No obvious lymphadenopathy.  NEUROLOGIC:  No headache, loss of consciousness, or seizure activity.  PSYCHIATRIC:  Stable, mood not currently anxious or depressed.    PHYSICAL EXAMINATION:  VITAL SIGNS:  Temperature 36.9, blood pressure is 144/97, pulse 74, SpO2 is   96%, platelets 130.    LABORATORY DATA:  Sodium 135, potassium 3.6, chloride 99, bicarbonate 26,   glucose 225, BUN and creatinine 17 and 1.09 respectively.    IMAGING:  Gallbladder ultrasound showed mild dilatation of common bile duct   without choledocholithiasis identified, heterogenous pancreatic echotexture   may be  related to patient's history of appendicitis.    ASSESSMENT AND PLAN:  1.  Acute on chronic pancreatitis.  Mild dilatation of the common bile duct.    Positive Doll sign.  For now, I will obtain an MRCP.  Dr. Jaime of   gastroenterology has been called and consulted.  I will put him on bowel rest,   IV fluids, IV PPI, pain control with bowel regimen and antiemetics.  We will   discontinue the doxycycline as there is no indication currently.  2.  Diabetes type 2, probably uncontrolled.  Hold off on oral hypoglycemics   for now as he will be put on bowel rest.  Add sliding scale insulin.  3.  Hypertension.  Currently stable.  Continue his home antihypertensives.   4.  Pharmacologic deep venous thrombosis prophylaxis.    I spent 72 minutes evaluating the patient, speaking with his wife, reviewing   the chart, vitals, labs, imaging, discussing the case with ED physician,   medication reconciliation, placing orders and enacting the plan above.       ____________________________________     Brenden Whitaker MD    [facility] / [facility]    DD:  05/28/2017 15:49:53  DT:  05/28/2017 17:10:10    D#:  [id number]  Job#:  [id number]    cc: MENDOZA MANDEL DO

## 2017-05-29 NOTE — CONSULTS
"DATE OF SERVICE:  05/28/2017    CHIEF COMPLAINT:  Abdominal pain, epigastric pain.    HISTORY OF PRESENT ILLNESS:  We were asked by the emergency room to evaluate   this patient for possible pancreatitis and abdominal pain.  The patient comes   in with severe epigastric pain.  It has been going on for several days.  There   is some radiation to the back.  The patient said that it reminds him of his   chronic pancreatitis.  \"He is telling me once you have pancreatitis and   chronic pancreatitis, you know what it is.\"    He has a remote history of chronic pancreatitis and acute pancreatitis   secondary to alcohol dependence.  He has been completely sober and has   remained sober over the last 7 years.  He also has received endoscopic   ultrasound with celiac plexus block in the past.  The only new thing is that   recently he started doxycycline just a few days ago for an ear infection, but   he only took this for two days.    Of interest is that his lipase is minimally elevated at 89, just above the   upper limit of normal.  Patient denies GI bleeding, hematemesis or melena.    There is no nausea or vomiting.  He usually does not have chronic GERD.    He has had endoscopies in the past, but not in the recent past, it has been   many years ago.  He does not have a history of ulcers.  He does take chronic   NSAIDs for aches and pains.    PAST MEDICAL HISTORY:  Hypertension, diabetes, CLL, apparently under control,   obesity, gastric bypass with Amanda-en-Y, laminectomy, lumbar fusion, celiac   plexus block.    SOCIAL HISTORY:  The patient does not smoke.  He does not drink.  He has a   history of alcohol recovery; last drink was in 2010.    FAMILY HISTORY:  Notable for alcoholism in parents and siblings.    MEDICATIONS:  Cholecalciferol, melatonin, loratadine, metoprolol, tadalafil,   ibuprofen, pravastatin, chlorthalidone, glipizide, metformin, losartan,   Plavix, cyanocobalamin, multivitamins.    ALLERGIES:  TO " PENICILLIN.    REVIEW OF SYSTEMS:  CONSTITUTIONAL:  The patient denies weight loss.  CARDIOVASCULAR:  Negative.  RESPIRATORY:  Negative.  GASTROINTESTINAL:  As per above.  GENITOURINARY:  Negative.  MUSCULOSKELETAL:  Negative.  HEMATOLOGIC:  The patient has CLL.  NEUROLOGIC:  The patient may have had a stroke, but I am not sure about that.    I do not remember exactly.  The rest of review of systems is negative or as   per HPI.    PHYSICAL EXAMINATION:  VITAL SIGNS:  Temperature 36.9, blood pressure 144/97, pulse 74, O2 saturation   is 96%.  GENERAL:  The patient is in no acute distress, pleasant, examined.  His wife   is at the bedside.  NECK:  Supple.  No asymmetry.  HEENT:  Sclerae are anicteric.  Conjunctivae not injected.  Oropharynx, no   gross lesions.  CHEST:  Unlabored respirations, speaking in full sentences.  CARDIOVASCULAR:  RRR.  ABDOMEN:  Soft.  There is epigastric tenderness just below the xiphoid.  There   is no rebound, no rigidity, no hepatosplenomegaly.  EXTREMITIES:  No CCE.  NEUROPSYCH:  Appropriate exam and interview nonfocal.  SKIN:  No rash.  No erythema or gross indurations.    IMPRESSION:  1.  Epigastric pain.  2.  History of chronic pancreatitis.  3.  History of celiac plexus block.  4.  Mild dilation of approximately 6 mm of the common bile duct.  No evidence   for choledocholithiasis.  5.  Normal liver function tests.  6.  Mildly elevated lipase.    RECOMMENDATIONS:  N.p.o. after midnight; n.p.o., he can have clear liquids   overnight.  This could be ulcerative.  This could be related to the chronic   NSAID use.  This could be an anastomotic ulcer.  He understands we cannot look   beyond the small gastric pouch and the efferent loops.  We will start there.    He probably should be on a PPI as well, may be hold the NSAIDs and I am not   convinced that his pancreatitis to be acute or chronic or acute-on chronic,   rather I may feel that it is possibly ___ related and certainly anastomotic    and ischemic ulcer needs to be ruled out.    We thank you for this consultation and we will follow the patient, review from   the digestive health perspective.  I will sign the patient out for evaluation   tomorrow.    Thank you very much.       ____________________________________     Reginaldo Jaime MD EMD / [facility]    DD:  05/28/2017 18:14:52  DT:  05/28/2017 19:12:49    D#:  [id number]  Job#:  [id number]

## 2017-05-29 NOTE — PROGRESS NOTES
Gastroenterology Progress Note     Author: Noe Arcelia   Date & Time Created: 5/29/2017 4:37 PM    Interval History:  Having some abdominal pain but better from previous   Hx of chronic pancreatitis from ETOH abuse  Tolerated clear liquid diet but did have a little nausea and clear emesis    Review of Systems:  Review of Systems   Constitutional: Negative.    HENT: Negative.    Respiratory: Negative.    Cardiovascular: Negative.    Gastrointestinal: Positive for abdominal pain.   Genitourinary: Negative.    Musculoskeletal: Negative.    Skin: Negative.    Neurological: Negative.        Physical Exam:  Physical Exam   Constitutional: He is oriented to person, place, and time. He appears well-developed.   HENT:   Head: Normocephalic.   Eyes: Pupils are equal, round, and reactive to light.   Neck: Normal range of motion. Neck supple.   Cardiovascular: Normal rate and regular rhythm.    Pulmonary/Chest: Effort normal and breath sounds normal.   Abdominal: Soft. There is tenderness.   Neurological: He is alert and oriented to person, place, and time.       Labs:        Invalid input(s): [id number]  Recent Labs      05/28/17 1135   TROPONINI  <0.02     Recent Labs      05/28/17 1135  05/29/17   0446   SODIUM  135  131*   POTASSIUM  3.6  3.6   CHLORIDE  99  97   CO2  26  29   BUN  17  6*   CREATININE  1.09  0.83   CALCIUM  9.2  8.4     Recent Labs      05/28/17 1135  05/29/17   0446   ALTSGPT  15   --    ASTSGOT  18   --    ALKPHOSPHAT  89   --    TBILIRUBIN  0.9   --    LIPASE  89*   --    GLUCOSE  225*  192*     Recent Labs      05/28/17 1135 05/29/17   0446   RBC  4.48*  3.91*   HEMOGLOBIN  13.2*  11.5*   HEMATOCRIT  36.9*  32.6*   PLATELETCT  130*  109*     Recent Labs      05/28/17 1135  05/29/17   0446   WBC  11.0*  7.8   NEUTSPOLYS  50.00   --    LYMPHOCYTES  44.00*   --    MONOCYTES  5.00   --    EOSINOPHILS  1.00   --    BASOPHILS  0.00   --    ASTSGOT  18   --    ALTSGPT  15   --     ALKPHOSPHAT  89   --    TBILIRUBIN  0.9   --      Hemodynamics:  Temp (24hrs), Av.8 °C (98.2 °F), Min:36.5 °C (97.7 °F), Max:37.1 °C (98.7 °F)  Temperature: 37.1 °C (98.7 °F)  Pulse  Av.5  Min: 66  Max: 89   Blood Pressure: (!) 167/87 mmHg     Respiratory:    Respiration: 18, Pulse Oximetry: 91 %           Fluids:    Intake/Output Summary (Last 24 hours) at 17 1637  Last data filed at 17 1455   Gross per 24 hour   Intake   1490 ml   Output    150 ml   Net   1340 ml        GI/Nutrition:  Orders Placed This Encounter   Procedures   • DIET ORDER     Standing Status: Standing      Number of Occurrences: 1      Standing Expiration Date:      Order Specific Question:  Diet:     Answer:  Clear Liquid [10]     Order Specific Question:  Diet:     Answer:  Diabetic [3]     Medical Decision Making, by Problem:  Active Hospital Problems    Diagnosis   • *Abdominal pain acute [R10.9]   • Hyponatremia [E87.1]   • Chronic pancreatitis (CMS-HCC) [K86.1]   • Dilated cbd, acquired [K83.8]   • Type 2 diabetes mellitus without complication (CMS-HCC) [E11.9]   • CLL (chronic lymphocytic leukemia) (CMS-HCC) [C91.10]   • Hypertension [I10]   • History of Amanda-en-Y gastric bypass [Z98.84]   • Normocytic normochromic anemia [D64.9]   • Mixed hyperlipidemia [E78.2]       Plan:  egd tomorrow per surgery scheduling  Possible ulcer disease vs recurrent chronic pancreatitis hx  Given abn MRCP findings would benefit from follow up with periodic imaging studies every few years  NPOafter midnight    Core Measures

## 2017-05-29 NOTE — PROGRESS NOTES
Received report from day shift nurse. Assumed pt care at 1915. Pt is A&Ox4, resting comfortably in bed. Pt on r.a. No signs of SOB/respiratory distress noted. Assessment completed, Labs noted, VSS. Pt c/o pain to mid abdomen and the back of 8/10 on the scale of 0-10. Given pain meds per MAR. Fall precautions in place. Treaded socks on. Bed locked. Bed at lowest position. Call light and personal belongings within reach. Continue to monitor

## 2017-05-29 NOTE — DISCHARGE PLANNING
Care Transition Team Assessment    Information Source  Orientation : Oriented x 4  Information Given By: Patient  Who is responsible for making decisions for patient? : Patient    Readmission Evaluation  Is this a readmission?: No    Elopement Risk  Legal Hold: No  Ambulatory or Self Mobile in Wheelchair: Yes  Disoriented: No  Psychiatric Symptoms: None  History of Wandering: No  Elopement this Admit: No  Vocalizing Wanting to Leave: No  Displays Behaviors, Body Language Wanting to Leave: No-Not at Risk for Elopement  Elopement Risk: Not at Risk for Elopement    Interdisciplinary Discharge Planning  Does Admitting Nurse Feel This Could be a Complex Discharge?: No  Primary Care Physician: Dr. Denis   Lives with - Patient's Self Care Capacity: Spouse  Patient or legal guardian wants to designate a caregiver (see row info): No  Support Systems: Family Member(s), Spouse / Significant Other  Do You Take your Prescribed Medications Regularly: Yes  Able to Return to Previous ADL's: Yes  Mobility Issues: No  Prior Services: None    Discharge Preparedness  What is your plan after discharge?: Home with help  What are your discharge supports?: Spouse         Finances  Financial Barriers to Discharge: No  Prescription Coverage: Yes    Vision / Hearing Impairment  Vision Impairment : Yes  Right Eye Vision: Impaired, Wears Glasses  Left Eye Vision: Impaired, Wears Glasses  Hearing Impairment : No    Values / Beliefs / Concerns  Values / Beliefs Concerns : No    Advance Directive  Advance Directive?: None    Domestic Abuse  Have you ever been the victim of abuse or violence?: No    Psychological Assessment  History of Substance Abuse: None  Date Last Used - Alcohol: Alchol- 2010    Discharge Risks or Barriers  Discharge risks or barriers?: No    Anticipated Discharge Information  Anticipated discharge disposition: Home

## 2017-05-29 NOTE — DISCHARGE PLANNING
Patient discussed in morning rounds. Patient reportedly lives at home with her spouse and she plans to return home when medically able. No current SS needs noted.

## 2017-05-30 ENCOUNTER — HOSPITAL ENCOUNTER (OUTPATIENT)
Facility: MEDICAL CENTER | Age: 57
End: 2017-05-30
Attending: INTERNAL MEDICINE
Payer: COMMERCIAL

## 2017-05-30 LAB
ANION GAP SERPL CALC-SCNC: 10 MMOL/L (ref 0–11.9)
BASOPHILS # BLD AUTO: 0.4 % (ref 0–1.8)
BASOPHILS # BLD: 0.03 K/UL (ref 0–0.12)
BUN SERPL-MCNC: 6 MG/DL (ref 8–22)
CALCIUM SERPL-MCNC: 8.8 MG/DL (ref 8.4–10.2)
CHLORIDE SERPL-SCNC: 98 MMOL/L (ref 96–112)
CO2 SERPL-SCNC: 27 MMOL/L (ref 20–33)
CREAT SERPL-MCNC: 0.85 MG/DL (ref 0.5–1.4)
EOSINOPHIL # BLD AUTO: 0.02 K/UL (ref 0–0.51)
EOSINOPHIL NFR BLD: 0.2 % (ref 0–6.9)
ERYTHROCYTE [DISTWIDTH] IN BLOOD BY AUTOMATED COUNT: 36 FL (ref 35.9–50)
GFR SERPL CREATININE-BSD FRML MDRD: >60 ML/MIN/1.73 M 2
GLUCOSE BLD-MCNC: 169 MG/DL (ref 65–99)
GLUCOSE BLD-MCNC: 190 MG/DL (ref 65–99)
GLUCOSE BLD-MCNC: 195 MG/DL (ref 65–99)
GLUCOSE BLD-MCNC: 203 MG/DL (ref 65–99)
GLUCOSE SERPL-MCNC: 204 MG/DL (ref 65–99)
HCT VFR BLD AUTO: 34.3 % (ref 42–52)
HGB BLD-MCNC: 12.4 G/DL (ref 14–18)
IMM GRANULOCYTES # BLD AUTO: 0.04 K/UL (ref 0–0.11)
IMM GRANULOCYTES NFR BLD AUTO: 0.5 % (ref 0–0.9)
LYMPHOCYTES # BLD AUTO: 4.21 K/UL (ref 1–4.8)
LYMPHOCYTES NFR BLD: 49.4 % (ref 22–41)
MCH RBC QN AUTO: 29.5 PG (ref 27–33)
MCHC RBC AUTO-ENTMCNC: 36.2 G/DL (ref 33.7–35.3)
MCV RBC AUTO: 81.7 FL (ref 81.4–97.8)
MONOCYTES # BLD AUTO: 0.47 K/UL (ref 0–0.85)
MONOCYTES NFR BLD AUTO: 5.5 % (ref 0–13.4)
NEUTROPHILS # BLD AUTO: 3.75 K/UL (ref 1.82–7.42)
NEUTROPHILS NFR BLD: 44 % (ref 44–72)
NRBC # BLD AUTO: 0 K/UL
NRBC BLD AUTO-RTO: 0 /100 WBC
PATHOLOGY CONSULT NOTE: NORMAL
PLATELET # BLD AUTO: 130 K/UL (ref 164–446)
PMV BLD AUTO: 10.7 FL (ref 9–12.9)
POTASSIUM SERPL-SCNC: 3.5 MMOL/L (ref 3.6–5.5)
RBC # BLD AUTO: 4.2 M/UL (ref 4.7–6.1)
SODIUM SERPL-SCNC: 135 MMOL/L (ref 135–145)
WBC # BLD AUTO: 8.5 K/UL (ref 4.8–10.8)

## 2017-05-30 PROCEDURE — 160048 HCHG OR STATISTICAL LEVEL 1-5: Performed by: INTERNAL MEDICINE

## 2017-05-30 PROCEDURE — 700101 HCHG RX REV CODE 250: Performed by: INTERNAL MEDICINE

## 2017-05-30 PROCEDURE — 500066 HCHG BITE BLOCK, ECT: Performed by: INTERNAL MEDICINE

## 2017-05-30 PROCEDURE — 700105 HCHG RX REV CODE 258: Performed by: INTERNAL MEDICINE

## 2017-05-30 PROCEDURE — 770006 HCHG ROOM/CARE - MED/SURG/GYN SEMI*

## 2017-05-30 PROCEDURE — 85025 COMPLETE CBC W/AUTO DIFF WBC: CPT

## 2017-05-30 PROCEDURE — 700111 HCHG RX REV CODE 636 W/ 250 OVERRIDE (IP)

## 2017-05-30 PROCEDURE — 160002 HCHG RECOVERY MINUTES (STAT): Performed by: INTERNAL MEDICINE

## 2017-05-30 PROCEDURE — A9270 NON-COVERED ITEM OR SERVICE: HCPCS | Performed by: INTERNAL MEDICINE

## 2017-05-30 PROCEDURE — 88305 TISSUE EXAM BY PATHOLOGIST: CPT

## 2017-05-30 PROCEDURE — 99232 SBSQ HOSP IP/OBS MODERATE 35: CPT | Performed by: INTERNAL MEDICINE

## 2017-05-30 PROCEDURE — C9113 INJ PANTOPRAZOLE SODIUM, VIA: HCPCS | Performed by: INTERNAL MEDICINE

## 2017-05-30 PROCEDURE — 160035 HCHG PACU - 1ST 60 MINS PHASE I: Performed by: INTERNAL MEDICINE

## 2017-05-30 PROCEDURE — 160009 HCHG ANES TIME/MIN: Performed by: INTERNAL MEDICINE

## 2017-05-30 PROCEDURE — 700111 HCHG RX REV CODE 636 W/ 250 OVERRIDE (IP): Performed by: INTERNAL MEDICINE

## 2017-05-30 PROCEDURE — 160208 HCHG ENDO MINUTES - EA ADDL 1 MIN LEVEL 4: Performed by: INTERNAL MEDICINE

## 2017-05-30 PROCEDURE — 160203 HCHG ENDO MINUTES - 1ST 30 MINS LEVEL 4: Performed by: INTERNAL MEDICINE

## 2017-05-30 PROCEDURE — 700102 HCHG RX REV CODE 250 W/ 637 OVERRIDE(OP): Performed by: INTERNAL MEDICINE

## 2017-05-30 PROCEDURE — 82962 GLUCOSE BLOOD TEST: CPT

## 2017-05-30 PROCEDURE — 88312 SPECIAL STAINS GROUP 1: CPT

## 2017-05-30 PROCEDURE — 502240 HCHG MISC OR SUPPLY RC 0272: Performed by: INTERNAL MEDICINE

## 2017-05-30 PROCEDURE — 80048 BASIC METABOLIC PNL TOTAL CA: CPT

## 2017-05-30 PROCEDURE — 0DB68ZX EXCISION OF STOMACH, VIA NATURAL OR ARTIFICIAL OPENING ENDOSCOPIC, DIAGNOSTIC: ICD-10-PCS | Performed by: INTERNAL MEDICINE

## 2017-05-30 PROCEDURE — 36415 COLL VENOUS BLD VENIPUNCTURE: CPT

## 2017-05-30 RX ORDER — SODIUM CHLORIDE, SODIUM LACTATE, POTASSIUM CHLORIDE, CALCIUM CHLORIDE 600; 310; 30; 20 MG/100ML; MG/100ML; MG/100ML; MG/100ML
INJECTION, SOLUTION INTRAVENOUS CONTINUOUS
Status: DISCONTINUED | OUTPATIENT
Start: 2017-05-30 | End: 2017-05-31

## 2017-05-30 RX ADMIN — LOSARTAN POTASSIUM 100 MG: 25 TABLET, FILM COATED ORAL at 08:49

## 2017-05-30 RX ADMIN — STANDARDIZED SENNA CONCENTRATE AND DOCUSATE SODIUM 2 TABLET: 8.6; 5 TABLET, FILM COATED ORAL at 15:53

## 2017-05-30 RX ADMIN — MULTIVITAMIN TABLET 1 TABLET: TABLET at 15:53

## 2017-05-30 RX ADMIN — HYDROMORPHONE HYDROCHLORIDE 0.5 MG: 1 INJECTION, SOLUTION INTRAMUSCULAR; INTRAVENOUS; SUBCUTANEOUS at 00:05

## 2017-05-30 RX ADMIN — HYDROMORPHONE HYDROCHLORIDE 0.5 MG: 1 INJECTION, SOLUTION INTRAMUSCULAR; INTRAVENOUS; SUBCUTANEOUS at 09:01

## 2017-05-30 RX ADMIN — SODIUM CHLORIDE: 9 INJECTION, SOLUTION INTRAVENOUS at 20:34

## 2017-05-30 RX ADMIN — METOPROLOL SUCCINATE 100 MG: 100 TABLET, EXTENDED RELEASE ORAL at 20:34

## 2017-05-30 RX ADMIN — ONDANSETRON 4 MG: 2 INJECTION INTRAMUSCULAR; INTRAVENOUS at 09:04

## 2017-05-30 RX ADMIN — SODIUM CHLORIDE, SODIUM LACTATE, POTASSIUM CHLORIDE, CALCIUM CHLORIDE: 600; 310; 30; 20 INJECTION, SOLUTION INTRAVENOUS at 12:30

## 2017-05-30 RX ADMIN — INSULIN LISPRO 3 UNITS: 100 INJECTION, SOLUTION INTRAVENOUS; SUBCUTANEOUS at 21:48

## 2017-05-30 RX ADMIN — LORATADINE 10 MG: 10 TABLET ORAL at 15:52

## 2017-05-30 RX ADMIN — CIPROFLOXACIN 400 MG: 2 INJECTION, SOLUTION INTRAVENOUS at 20:34

## 2017-05-30 RX ADMIN — HYDROMORPHONE HYDROCHLORIDE 0.5 MG: 1 INJECTION, SOLUTION INTRAMUSCULAR; INTRAVENOUS; SUBCUTANEOUS at 02:49

## 2017-05-30 RX ADMIN — HYDROMORPHONE HYDROCHLORIDE 0.5 MG: 1 INJECTION, SOLUTION INTRAMUSCULAR; INTRAVENOUS; SUBCUTANEOUS at 20:34

## 2017-05-30 RX ADMIN — PRAVASTATIN SODIUM 40 MG: 20 TABLET ORAL at 20:34

## 2017-05-30 RX ADMIN — HYDROMORPHONE HYDROCHLORIDE 0.5 MG: 1 INJECTION, SOLUTION INTRAMUSCULAR; INTRAVENOUS; SUBCUTANEOUS at 17:27

## 2017-05-30 RX ADMIN — METRONIDAZOLE 500 MG: 500 INJECTION, SOLUTION INTRAVENOUS at 21:44

## 2017-05-30 RX ADMIN — METRONIDAZOLE 500 MG: 500 INJECTION, SOLUTION INTRAVENOUS at 05:59

## 2017-05-30 RX ADMIN — HYDROMORPHONE HYDROCHLORIDE 0.5 MG: 1 INJECTION, SOLUTION INTRAMUSCULAR; INTRAVENOUS; SUBCUTANEOUS at 05:58

## 2017-05-30 RX ADMIN — PANTOPRAZOLE SODIUM 40 MG: 40 INJECTION, POWDER, FOR SOLUTION INTRAVENOUS at 08:45

## 2017-05-30 RX ADMIN — CIPROFLOXACIN 400 MG: 2 INJECTION, SOLUTION INTRAVENOUS at 08:47

## 2017-05-30 RX ADMIN — CHLORTHALIDONE 25 MG: 25 TABLET ORAL at 15:52

## 2017-05-30 RX ADMIN — METRONIDAZOLE 500 MG: 500 INJECTION, SOLUTION INTRAVENOUS at 15:51

## 2017-05-30 RX ADMIN — STANDARDIZED SENNA CONCENTRATE AND DOCUSATE SODIUM 2 TABLET: 8.6; 5 TABLET, FILM COATED ORAL at 20:34

## 2017-05-30 RX ADMIN — ONDANSETRON 4 MG: 4 TABLET, ORALLY DISINTEGRATING ORAL at 06:01

## 2017-05-30 RX ADMIN — OXYCODONE HYDROCHLORIDE 10 MG: 10 TABLET ORAL at 23:06

## 2017-05-30 RX ADMIN — Medication 500 MCG: at 15:53

## 2017-05-30 RX ADMIN — CLOPIDOGREL BISULFATE 75 MG: 75 TABLET, FILM COATED ORAL at 15:52

## 2017-05-30 ASSESSMENT — ENCOUNTER SYMPTOMS
EYE PAIN: 0
DIZZINESS: 0
PSYCHIATRIC NEGATIVE: 1
LOSS OF CONSCIOUSNESS: 0
DIARRHEA: 0
BLOOD IN STOOL: 0
MUSCULOSKELETAL NEGATIVE: 1
NERVOUS/ANXIOUS: 0
CONSTIPATION: 0
DIAPHORESIS: 0
FOCAL WEAKNESS: 0
PALPITATIONS: 0
ABDOMINAL PAIN: 1
SEIZURES: 0
HEMOPTYSIS: 0
BRUISES/BLEEDS EASILY: 0
CHILLS: 0
EYES NEGATIVE: 1
DOUBLE VISION: 0
RESPIRATORY NEGATIVE: 1
COUGH: 0
HEARTBURN: 0
VOMITING: 0
WEAKNESS: 1
NAUSEA: 0
HEADACHES: 0
WHEEZING: 0
ORTHOPNEA: 0
CARDIOVASCULAR NEGATIVE: 1

## 2017-05-30 ASSESSMENT — PAIN SCALES - GENERAL
PAINLEVEL_OUTOF10: 9
PAINLEVEL_OUTOF10: 3
PAINLEVEL_OUTOF10: 0
PAINLEVEL_OUTOF10: 0
PAINLEVEL_OUTOF10: 7
PAINLEVEL_OUTOF10: 7
PAINLEVEL_OUTOF10: 0
PAINLEVEL_OUTOF10: 7
PAINLEVEL_OUTOF10: 6

## 2017-05-30 NOTE — PROGRESS NOTES
Hospital Medicine Progress Note, Adult, Complex               Author: Donna Garcia    Chief Complaint Today: 57 year old male admitted with abdominal pain Date & Time created: 5/30/2017  8:31 AM     Interval History:  Mr Hart is a 55 year old male with history of Amanda-En-Y anastomosis and he is thus developed abdominal pain and most likely has an ulcer in the anastomotic site. He has slight elevation of his lipase to 89, he has mild dilation of aprt of the common bile duct but all things look like chronic pancreatitis and GI did see him and he is pending a morning EGD.   5/30 feeling stable overnight and awaiting for EGD today.     Review of Systems:  Review of Systems   Constitutional: Negative for chills and diaphoresis.   HENT: Negative.    Eyes: Negative.  Negative for double vision and pain.   Respiratory: Negative.  Negative for cough, hemoptysis and wheezing.    Cardiovascular: Negative.  Negative for chest pain, palpitations, orthopnea and leg swelling.   Gastrointestinal: Positive for abdominal pain. Negative for heartburn, nausea, vomiting, diarrhea, constipation and blood in stool.   Genitourinary: Negative.  Negative for dysuria, urgency, frequency and hematuria.   Musculoskeletal: Negative.  Negative for joint pain.   Skin: Negative.  Negative for itching and rash.   Neurological: Positive for weakness. Negative for dizziness, focal weakness, seizures, loss of consciousness and headaches.   Endo/Heme/Allergies: Negative.  Does not bruise/bleed easily.   Psychiatric/Behavioral: Negative.  Negative for suicidal ideas. The patient is not nervous/anxious.        Physical Exam:  Physical Exam   Constitutional: He is oriented to person, place, and time. He appears well-developed and well-nourished.   HENT:   Head: Normocephalic and atraumatic.   Right Ear: External ear normal.   Left Ear: External ear normal.   Nose: Nose normal.   Mouth/Throat: Oropharynx is clear and moist.   Eyes: Conjunctivae and EOM are  normal. Pupils are equal, round, and reactive to light.   Neck: Normal range of motion. Neck supple. No JVD present. No thyromegaly present.   Cardiovascular: Normal rate, regular rhythm and normal heart sounds.  Exam reveals no gallop and no friction rub.    No murmur heard.  Pulmonary/Chest: Effort normal and breath sounds normal. No respiratory distress. He has no wheezes. He has no rales. He exhibits no tenderness.   Abdominal: Soft. Bowel sounds are normal. He exhibits distension. He exhibits no mass. There is tenderness in the right upper quadrant and epigastric area. There is no rebound and no guarding.   Genitourinary:   King catheter   Musculoskeletal: Normal range of motion. He exhibits no edema or tenderness.   Lymphadenopathy:     He has no cervical adenopathy.   Neurological: He is alert and oriented to person, place, and time. He has normal reflexes. No cranial nerve deficit.   Skin: Skin is warm and dry. No rash noted. No erythema. No pallor.   Psychiatric: He has a normal mood and affect. His behavior is normal. Judgment and thought content normal.   Nursing note and vitals reviewed.      Labs:        Invalid input(s): [id number]  Recent Labs      05/28/17 1135   TROPONINI  <0.02     Recent Labs      05/28/17 1135 05/29/17 0446   SODIUM  135  131*   POTASSIUM  3.6  3.6   CHLORIDE  99  97   CO2  26  29   BUN  17  6*   CREATININE  1.09  0.83   CALCIUM  9.2  8.4     Recent Labs      05/28/17 1135 05/29/17 0446   ALTSGPT  15   --    ASTSGOT  18   --    ALKPHOSPHAT  89   --    TBILIRUBIN  0.9   --    LIPASE  89*   --    GLUCOSE  225*  192*     Recent Labs      05/28/17 1135 05/29/17 0446   RBC  4.48*  3.91*   HEMOGLOBIN  13.2*  11.5*   HEMATOCRIT  36.9*  32.6*   PLATELETCT  130*  109*     Recent Labs      05/28/17 1135 05/29/17 0446   WBC  11.0*  7.8   NEUTSPOLYS  50.00   --    LYMPHOCYTES  44.00*   --    MONOCYTES  5.00   --    EOSINOPHILS  1.00   --    BASOPHILS  0.00   --     ASTSGOT  18   --    ALTSGPT  15   --    ALKPHOSPHAT  89   --    TBILIRUBIN  0.9   --            Hemodynamics:  Temp (24hrs), Av.9 °C (98.5 °F), Min:36.8 °C (98.2 °F), Max:37.1 °C (98.7 °F)  Temperature: 37 °C (98.6 °F)  Pulse  Av.8  Min: 66  Max: 90   Blood Pressure: 160/81 mmHg     Respiratory:    Respiration: 16, Pulse Oximetry: 94 %           Fluids:    Intake/Output Summary (Last 24 hours) at 17 0831  Last data filed at 17 0800   Gross per 24 hour   Intake   2540 ml   Output   1270 ml   Net   1270 ml        GI/Nutrition:  Orders Placed This Encounter   Procedures   • DIET NPO     Standing Status: Standing      Number of Occurrences: 8      Standing Expiration Date:      Order Specific Question:  Restrict to:     Answer:  Sips with Medications [3]     Medical Decision Making, by Problem:  Active Hospital Problems    Diagnosis   • *Abdominal pain acute [R10.9]  -suspect ulcer disease  -chronic pancreatitis is there and needs pain management as well  - pending EGD today   • Hyponatremia [E87.1]  -fluid management and monitor BS as he has shot up with his BS and sodium dropped  - stable last night   • Chronic pancreatitis (CMS-HCC) [K86.1]  -monitor for pain and hydrate   • Dilated cbd, acquired [K83.8]  -chronic in nature   • Type 2 diabetes mellitus without complication (CMS-HCC) [E11.9]  -accus with sliding scale coverage  -diabetic diet  -diabetic education  -follow glycohemoglobin levels long term  -continue with oral antihyperglycemics  -monitor for hypoglycemic episodes and adjust control if he should get low   • CLL (chronic lymphocytic leukemia) (CMS-HCC) [C91.10]  -on chemotherapy as outpatient   • Hypertension [I10]  -controlled at 139/72   • History of Amanda-en-Y gastric bypass [Z98.84]  -possibele ulcer in the anastomosis    • Normocytic normochromic anemia [D64.9]  -monitor H&H   • Mixed hyperlipidemia [E78.2]  -low fat diet       EKG reviewed, Radiology images reviewed, Labs  reviewed and Medications reviewed  King catheter: No King      DVT Prophylaxis: Enoxaparin (Lovenox)    Ulcer prophylaxis: Yes  Antibiotics: Treating active infection/contamination beyond 24 hours perioperative coverage  Assessed for rehab: Patient was assess for and/or received rehabilitation services during this hospitalization    For complexity-based billing, please refer to the history, exam, and decison making above. In addition, I spent 35 minutes caring for the patient today. More than 50% of the time was spent counseling and coordinating care.    I have discussed with RN and MACKENZIE and SW and other consultants about patient's plan.

## 2017-05-30 NOTE — PROGRESS NOTES
Report given to Holly.c/o pain medicated with Dilaudid.CBG=203-held insulin due to NPO status.To surgery via his bed.

## 2017-05-30 NOTE — OR NURSING
1439- Pt to pacu with side rails up from Endo.  VSS. Pt arouses to voice.    1449- VSS. Pt denies pain/nausea.  Abd non tender, BS qX4.  1559- no changes.  1516- Report to Char SANDERS.

## 2017-05-30 NOTE — OR SURGEON
Immediate Post-Operative Note      PreOp Diagnosis: abdominal pain epigastric    PostOp Diagnosis:    1/ normal esophagus ,GEJ 38 cm   2/ 10 cm gastric  Bypass pouch, intact no sutures or staples noted   3/ both efferent and afferent limps of small bowel are normal   4/ in the gastric pouch there is a 6 mm clean based ulcer which was biopsied   5/ retro flexion was normal    Procedure(s):  GASTROSCOPY-ENDO - Wound Class: Clean Contaminated    Surgeon(s):  Noe Paniagua M.D.    Anesthesiologist/Type of Anesthesia:  Anesthesiologist: Bart Nevarez M.D./General    Surgical Staff:  Circulator: Robert Richard R.N.  Endoscopy Technician: Diana Junior    Specimen: gastric ulcer biopsy    Estimated Blood Loss: none    Findings: above    Complications: none    Recommendations   1/ await pathology findings   2/ continue omeprazole therapy 40 mg po qd X 8-12 wks   3/ avoid NSAIDS   4/ follow up as o/p with [phone]        5/30/2017 2:41 PM Noe Paniagua

## 2017-05-30 NOTE — PROGRESS NOTES
Received report,resting in bed alert and oriented x4,tolerable pain verbalized at this time.Discussed POC and verbalized understanding,maintained NPO for EGD today.Instructed to call for any needs call light with in reach.

## 2017-05-30 NOTE — OR NURSING
Patient allergies and NPO status verified. Patient verbalizes understanding of pain scale, expected course of stay and plan of care. Surgical site verified with patient. IV assessed for patency. Pt awaiting surgery.

## 2017-05-30 NOTE — PROGRESS NOTES
Received report from day shift nurse. Assumed pt care at 1915. Pt is A&Ox4, resting comfortably in bed. Pt on r.a. No signs of SOB/respiratory distress. Labs noted, VSS. Pt has just received pain meds around 5:15pm; goal is to keep pain under control for the patient. Fall precautions in place. Bed locked. Bed at lowest position. Call light and personal belongings within reach. Continue to monitor

## 2017-05-31 DIAGNOSIS — K25.9 GASTRIC ULCER, UNSPECIFIED CHRONICITY, UNSPECIFIED WHETHER GASTRIC ULCER HEMORRHAGE OR PERFORATION PRESENT: ICD-10-CM

## 2017-05-31 LAB
ANION GAP SERPL CALC-SCNC: 9 MMOL/L (ref 0–11.9)
BASOPHILS # BLD AUTO: 0.4 % (ref 0–1.8)
BASOPHILS # BLD: 0.03 K/UL (ref 0–0.12)
BUN SERPL-MCNC: 6 MG/DL (ref 8–22)
CALCIUM SERPL-MCNC: 9 MG/DL (ref 8.4–10.2)
CHLORIDE SERPL-SCNC: 97 MMOL/L (ref 96–112)
CO2 SERPL-SCNC: 30 MMOL/L (ref 20–33)
CREAT SERPL-MCNC: 0.95 MG/DL (ref 0.5–1.4)
EOSINOPHIL # BLD AUTO: 0.04 K/UL (ref 0–0.51)
EOSINOPHIL NFR BLD: 0.5 % (ref 0–6.9)
ERYTHROCYTE [DISTWIDTH] IN BLOOD BY AUTOMATED COUNT: 36.7 FL (ref 35.9–50)
GFR SERPL CREATININE-BSD FRML MDRD: >60 ML/MIN/1.73 M 2
GLUCOSE BLD-MCNC: 285 MG/DL (ref 65–99)
GLUCOSE BLD-MCNC: 289 MG/DL (ref 65–99)
GLUCOSE BLD-MCNC: 289 MG/DL (ref 65–99)
GLUCOSE SERPL-MCNC: 196 MG/DL (ref 65–99)
HCT VFR BLD AUTO: 36.1 % (ref 42–52)
HGB BLD-MCNC: 12.8 G/DL (ref 14–18)
IMM GRANULOCYTES # BLD AUTO: 0.03 K/UL (ref 0–0.11)
IMM GRANULOCYTES NFR BLD AUTO: 0.4 % (ref 0–0.9)
LYMPHOCYTES # BLD AUTO: 3.85 K/UL (ref 1–4.8)
LYMPHOCYTES NFR BLD: 51.9 % (ref 22–41)
MCH RBC QN AUTO: 29.4 PG (ref 27–33)
MCHC RBC AUTO-ENTMCNC: 35.5 G/DL (ref 33.7–35.3)
MCV RBC AUTO: 83 FL (ref 81.4–97.8)
MONOCYTES # BLD AUTO: 0.44 K/UL (ref 0–0.85)
MONOCYTES NFR BLD AUTO: 5.9 % (ref 0–13.4)
NEUTROPHILS # BLD AUTO: 3.03 K/UL (ref 1.82–7.42)
NEUTROPHILS NFR BLD: 40.9 % (ref 44–72)
NRBC # BLD AUTO: 0 K/UL
NRBC BLD AUTO-RTO: 0 /100 WBC
PLATELET # BLD AUTO: 127 K/UL (ref 164–446)
PMV BLD AUTO: 10.9 FL (ref 9–12.9)
POTASSIUM SERPL-SCNC: 3.2 MMOL/L (ref 3.6–5.5)
RBC # BLD AUTO: 4.35 M/UL (ref 4.7–6.1)
SODIUM SERPL-SCNC: 136 MMOL/L (ref 135–145)
WBC # BLD AUTO: 7.4 K/UL (ref 4.8–10.8)

## 2017-05-31 PROCEDURE — 700101 HCHG RX REV CODE 250: Performed by: INTERNAL MEDICINE

## 2017-05-31 PROCEDURE — 700102 HCHG RX REV CODE 250 W/ 637 OVERRIDE(OP): Performed by: INTERNAL MEDICINE

## 2017-05-31 PROCEDURE — 99232 SBSQ HOSP IP/OBS MODERATE 35: CPT | Performed by: INTERNAL MEDICINE

## 2017-05-31 PROCEDURE — 82962 GLUCOSE BLOOD TEST: CPT | Mod: 91

## 2017-05-31 PROCEDURE — A9270 NON-COVERED ITEM OR SERVICE: HCPCS | Performed by: INTERNAL MEDICINE

## 2017-05-31 PROCEDURE — 85025 COMPLETE CBC W/AUTO DIFF WBC: CPT

## 2017-05-31 PROCEDURE — 770006 HCHG ROOM/CARE - MED/SURG/GYN SEMI*

## 2017-05-31 PROCEDURE — C9113 INJ PANTOPRAZOLE SODIUM, VIA: HCPCS | Performed by: INTERNAL MEDICINE

## 2017-05-31 PROCEDURE — 36415 COLL VENOUS BLD VENIPUNCTURE: CPT

## 2017-05-31 PROCEDURE — 80048 BASIC METABOLIC PNL TOTAL CA: CPT

## 2017-05-31 PROCEDURE — 700111 HCHG RX REV CODE 636 W/ 250 OVERRIDE (IP): Performed by: INTERNAL MEDICINE

## 2017-05-31 RX ORDER — METRONIDAZOLE 500 MG/1
500 TABLET ORAL EVERY 8 HOURS
Status: DISCONTINUED | OUTPATIENT
Start: 2017-05-31 | End: 2017-06-01

## 2017-05-31 RX ORDER — CIPROFLOXACIN 500 MG/1
500 TABLET, FILM COATED ORAL EVERY 12 HOURS
Status: DISCONTINUED | OUTPATIENT
Start: 2017-05-31 | End: 2017-06-01

## 2017-05-31 RX ORDER — POTASSIUM CHLORIDE 20 MEQ/1
40 TABLET, EXTENDED RELEASE ORAL ONCE
Status: COMPLETED | OUTPATIENT
Start: 2017-05-31 | End: 2017-05-31

## 2017-05-31 RX ORDER — CIPROFLOXACIN 250 MG/1
250 TABLET, FILM COATED ORAL EVERY 12 HOURS
Status: DISCONTINUED | OUTPATIENT
Start: 2017-05-31 | End: 2017-05-31

## 2017-05-31 RX ADMIN — INSULIN LISPRO 3 UNITS: 100 INJECTION, SOLUTION INTRAVENOUS; SUBCUTANEOUS at 21:15

## 2017-05-31 RX ADMIN — METRONIDAZOLE 500 MG: 500 INJECTION, SOLUTION INTRAVENOUS at 05:41

## 2017-05-31 RX ADMIN — PANTOPRAZOLE SODIUM 40 MG: 40 INJECTION, POWDER, FOR SOLUTION INTRAVENOUS at 08:29

## 2017-05-31 RX ADMIN — Medication 500 MCG: at 08:30

## 2017-05-31 RX ADMIN — LOSARTAN POTASSIUM 100 MG: 25 TABLET, FILM COATED ORAL at 08:30

## 2017-05-31 RX ADMIN — PRAVASTATIN SODIUM 40 MG: 20 TABLET ORAL at 21:06

## 2017-05-31 RX ADMIN — POTASSIUM CHLORIDE 40 MEQ: 1500 TABLET, EXTENDED RELEASE ORAL at 08:37

## 2017-05-31 RX ADMIN — CIPROFLOXACIN HYDROCHLORIDE 500 MG: 500 TABLET, FILM COATED ORAL at 21:06

## 2017-05-31 RX ADMIN — HYDROMORPHONE HYDROCHLORIDE 0.5 MG: 1 INJECTION, SOLUTION INTRAMUSCULAR; INTRAVENOUS; SUBCUTANEOUS at 02:15

## 2017-05-31 RX ADMIN — INSULIN LISPRO 1 UNITS: 100 INJECTION, SOLUTION INTRAVENOUS; SUBCUTANEOUS at 06:21

## 2017-05-31 RX ADMIN — CIPROFLOXACIN HYDROCHLORIDE 500 MG: 500 TABLET, FILM COATED ORAL at 08:30

## 2017-05-31 RX ADMIN — METRONIDAZOLE 500 MG: 500 TABLET ORAL at 14:33

## 2017-05-31 RX ADMIN — POLYETHYLENE GLYCOL 3350 1 PACKET: 17 POWDER, FOR SOLUTION ORAL at 21:11

## 2017-05-31 RX ADMIN — LORATADINE 10 MG: 10 TABLET ORAL at 08:30

## 2017-05-31 RX ADMIN — INSULIN LISPRO 3 UNITS: 100 INJECTION, SOLUTION INTRAVENOUS; SUBCUTANEOUS at 17:30

## 2017-05-31 RX ADMIN — INSULIN LISPRO 3 UNITS: 100 INJECTION, SOLUTION INTRAVENOUS; SUBCUTANEOUS at 12:00

## 2017-05-31 RX ADMIN — CLOPIDOGREL BISULFATE 75 MG: 75 TABLET, FILM COATED ORAL at 08:30

## 2017-05-31 RX ADMIN — METRONIDAZOLE 500 MG: 500 TABLET ORAL at 21:06

## 2017-05-31 RX ADMIN — VITAMIN D, TAB 1000IU (100/BT) 1000 UNITS: 25 TAB at 08:30

## 2017-05-31 RX ADMIN — STANDARDIZED SENNA CONCENTRATE AND DOCUSATE SODIUM 2 TABLET: 8.6; 5 TABLET, FILM COATED ORAL at 08:31

## 2017-05-31 RX ADMIN — OXYCODONE HYDROCHLORIDE 10 MG: 10 TABLET ORAL at 05:41

## 2017-05-31 RX ADMIN — MULTIVITAMIN TABLET 1 TABLET: TABLET at 08:30

## 2017-05-31 RX ADMIN — STANDARDIZED SENNA CONCENTRATE AND DOCUSATE SODIUM 2 TABLET: 8.6; 5 TABLET, FILM COATED ORAL at 21:06

## 2017-05-31 RX ADMIN — CHLORTHALIDONE 25 MG: 25 TABLET ORAL at 08:30

## 2017-05-31 RX ADMIN — METOPROLOL SUCCINATE 100 MG: 100 TABLET, EXTENDED RELEASE ORAL at 21:06

## 2017-05-31 ASSESSMENT — ENCOUNTER SYMPTOMS
FOCAL WEAKNESS: 0
SPEECH CHANGE: 0
EYES NEGATIVE: 1
DEPRESSION: 0
WEAKNESS: 0
WHEEZING: 0
HEADACHES: 0
PSYCHIATRIC NEGATIVE: 1
DIAPHORESIS: 0
DIARRHEA: 0
COUGH: 0
BRUISES/BLEEDS EASILY: 0
ORTHOPNEA: 0
BLOOD IN STOOL: 0
CARDIOVASCULAR NEGATIVE: 1
TINGLING: 0
FALLS: 0
PND: 0
SHORTNESS OF BREATH: 0
PALPITATIONS: 0
CHILLS: 0
MUSCULOSKELETAL NEGATIVE: 1
LOSS OF CONSCIOUSNESS: 0
ABDOMINAL PAIN: 0
EYE PAIN: 0
RESPIRATORY NEGATIVE: 1
SPUTUM PRODUCTION: 0
HEARTBURN: 0
VOMITING: 0
HALLUCINATIONS: 0
WEIGHT LOSS: 0
TREMORS: 0
FEVER: 0
NERVOUS/ANXIOUS: 0
NAUSEA: 0
BLURRED VISION: 0
SEIZURES: 0
DIZZINESS: 0
DOUBLE VISION: 0
BACK PAIN: 0
HEMOPTYSIS: 0
NECK PAIN: 0
CONSTIPATION: 0

## 2017-05-31 ASSESSMENT — PAIN SCALES - GENERAL
PAINLEVEL_OUTOF10: 5
PAINLEVEL_OUTOF10: 9
PAINLEVEL_OUTOF10: 6

## 2017-05-31 ASSESSMENT — LIFESTYLE VARIABLES: SUBSTANCE_ABUSE: 0

## 2017-05-31 NOTE — PROGRESS NOTES
1530-Back from surgery via his bed,minimaL pain verbalized.Discussed POC,requesting for regular food for dinner.

## 2017-05-31 NOTE — PROGRESS NOTES
Received report sitting up in bed alert and oriented x4,tolerable pain verbalized.Tolerating diet,voiding without difficulty.Discussed POC and verbalized understanding.Instructed to call for any needs call light with in reach.

## 2017-05-31 NOTE — PROGRESS NOTES
Hospital Medicine Progress Note, Adult, Complex               Author: Donna Garcia    Chief Complaint Today: 57 year old male admitted with abdominal pain Date & Time created: 5/31/2017  2:39 PM     Interval History:  Mr Hart is a 55 year old male with history of Amanda-En-Y anastomosis and he is thus developed abdominal pain and most likely has an ulcer in the anastomotic site. He has slight elevation of his lipase to 89, he has mild dilation of aprt of the common bile duct but all things look like chronic pancreatitis and GI did see him and he is pending a morning EGD.   5/30 feeling stable overnight and awaiting for EGD today.   5/31 tolerated procedure and pending path result. Stable and no CC overnight. No abd pain.    Review of Systems:  Review of Systems   Constitutional: Negative for fever, chills, weight loss and diaphoresis.   HENT: Negative.  Negative for congestion, ear discharge, ear pain, hearing loss and nosebleeds.    Eyes: Negative.  Negative for blurred vision, double vision and pain.   Respiratory: Negative.  Negative for cough, hemoptysis, sputum production, shortness of breath and wheezing.    Cardiovascular: Negative.  Negative for chest pain, palpitations, orthopnea, leg swelling and PND.   Gastrointestinal: Negative for heartburn, nausea, vomiting, abdominal pain, diarrhea, constipation and blood in stool.   Genitourinary: Negative.  Negative for dysuria, urgency, frequency and hematuria.   Musculoskeletal: Negative.  Negative for back pain, joint pain, falls and neck pain.   Skin: Negative.  Negative for itching and rash.   Neurological: Negative for dizziness, tingling, tremors, speech change, focal weakness, seizures, loss of consciousness, weakness and headaches.   Endo/Heme/Allergies: Negative.  Does not bruise/bleed easily.   Psychiatric/Behavioral: Negative.  Negative for depression, suicidal ideas, hallucinations and substance abuse. The patient is not nervous/anxious.        Physical  Exam:  Physical Exam   Constitutional: He is oriented to person, place, and time. He appears well-developed and well-nourished.   HENT:   Head: Normocephalic and atraumatic.   Right Ear: External ear normal.   Left Ear: External ear normal.   Nose: Nose normal.   Mouth/Throat: Oropharynx is clear and moist.   Eyes: Conjunctivae and EOM are normal. Pupils are equal, round, and reactive to light.   Neck: Normal range of motion. Neck supple. No JVD present. No thyromegaly present.   Cardiovascular: Normal rate, regular rhythm and normal heart sounds.  Exam reveals no gallop and no friction rub.    No murmur heard.  Pulmonary/Chest: Effort normal and breath sounds normal. No respiratory distress. He has no wheezes. He has no rales. He exhibits no tenderness.   Abdominal: Soft. Bowel sounds are normal. He exhibits no distension and no mass. There is tenderness in the right upper quadrant and epigastric area. There is no rebound and no guarding.   Genitourinary:   King catheter   Musculoskeletal: Normal range of motion. He exhibits no edema or tenderness.   Lymphadenopathy:     He has no cervical adenopathy.   Neurological: He is alert and oriented to person, place, and time. He has normal reflexes. No cranial nerve deficit.   Skin: Skin is warm and dry. No rash noted. He is not diaphoretic. No erythema. No pallor.   Psychiatric: He has a normal mood and affect. His behavior is normal. Judgment and thought content normal.   Nursing note and vitals reviewed.      Labs:        Invalid input(s): [id number]      Recent Labs      05/29/17 0446  05/30/17   0841  05/31/17   0511   SODIUM  131*  135  136   POTASSIUM  3.6  3.5*  3.2*   CHLORIDE  97  98  97   CO2  29  27  30   BUN  6*  6*  6*   CREATININE  0.83  0.85  0.95   CALCIUM  8.4  8.8  9.0     Recent Labs      05/29/17 0446 05/30/17   0841  05/31/17   0511   GLUCOSE  192*  204*  196*     Recent Labs      05/29/17 0446 05/30/17   0841  05/31/17   0511   RBC   3.91*  4.20*  4.35*   HEMOGLOBIN  11.5*  12.4*  12.8*   HEMATOCRIT  32.6*  34.3*  36.1*   PLATELETCT  109*  130*  127*     Recent Labs      17   0446  17   0841  17   0511   WBC  7.8  8.5  7.4   NEUTSPOLYS   --   44.00  40.90*   LYMPHOCYTES   --   49.40*  51.90*   MONOCYTES   --   5.50  5.90   EOSINOPHILS   --   0.20  0.50   BASOPHILS   --   0.40  0.40           Hemodynamics:  Temp (24hrs), Av.6 °C (97.9 °F), Min:36.3 °C (97.3 °F), Max:36.9 °C (98.4 °F)  Temperature: 36.3 °C (97.3 °F)  Pulse  Av.7  Min: 64  Max: 90Heart Rate (Monitored): 75  Blood Pressure: 143/71 mmHg     Respiratory:    Respiration: 18, Pulse Oximetry: 98 %           Fluids:    Intake/Output Summary (Last 24 hours) at 17 1439  Last data filed at 17 0923   Gross per 24 hour   Intake   1475 ml   Output   2300 ml   Net   -825 ml        GI/Nutrition:  Orders Placed This Encounter   Procedures   • DIET ORDER     Standing Status: Standing      Number of Occurrences: 1      Standing Expiration Date:      Order Specific Question:  Diet:     Answer:  Low Fiber(GI Soft) [2]     Order Specific Question:  Diet:     Answer:  Diabetic [3]     Medical Decision Making, by Problem:  Active Hospital Problems    Diagnosis   • *Abdominal pain acute [R10.9]  -suspect ulcer disease  -chronic pancreatitis is there and needs pain management as well  - found to have ulcer  - biopsy pending  - DC abx tmr   • Hyponatremia [E87.1]  -fluid management and monitor BS as he has shot up with his BS and sodium dropped  - stable last night   • Chronic pancreatitis (CMS-HCC) [K86.1]  -monitor for pain and hydrate   • Dilated cbd, acquired [K83.8]  -chronic in nature   • Type 2 diabetes mellitus without complication (CMS-[facility]) [E11.9]  -accus with sliding scale coverage  -diabetic diet  -diabetic education  -follow glycohemoglobin levels long term  -continue with oral antihyperglycemics  -monitor for hypoglycemic episodes and adjust control if he  should get low   • CLL (chronic lymphocytic leukemia) (CMS-HCC) [C91.10]  -on chemotherapy as outpatient   • Hypertension [I10]  -controlled at 139/72   • History of Amanda-en-Y gastric bypass [Z98.84]  -possibele ulcer in the anastomosis    • Normocytic normochromic anemia [D64.9]  -monitor H&H   • Mixed hyperlipidemia [E78.2]  -low fat diet       EKG reviewed, Radiology images reviewed, Labs reviewed and Medications reviewed  King catheter: No King      DVT Prophylaxis: Enoxaparin (Lovenox)    Ulcer prophylaxis: Yes  Antibiotics: Treating active infection/contamination beyond 24 hours perioperative coverage  Assessed for rehab: Patient was assess for and/or received rehabilitation services during this hospitalization    I have discussed with RN and MACKENZIE and GEORGINA and other consultants about patient's plan.

## 2017-05-31 NOTE — PROCEDURES
DATE OF SERVICE:  05/30/2017    PROCEDURE:  Gastroscopy.    INDICATIONS:  Abdominal pain.    Prior to the procedure, patient was informed of risks, benefits of the   procedure, freely signed the consent, was monitored under standard monitoring   blood pressure, oxygen, and heart monitor.  No abnormalities were appreciated   during the procedure.    ANESTHESIOLOGIST:  He was placed under anesthesia by Bart Nevarez MD    ENDOSCOPIST:  Noe Paniagua MD    ASSISTANT:  Ms. Junior.    CIRCULATING NURSE:  Robert Mcmanus.    POSTOPERATIVE FINDINGS:  1.  Normal esophagus.  GE junction appreciated at approximately 38 cm, intact   and regular.  2.  A 10-cm gastric bypass pouch was appreciated, was intact.  No sutures or   staples were appreciated.  3.  Both the afferent and efferent limbs of the small bowel were evaluated and   appeared normal.  4.  In the gastric pouch, there is a 6-mm clean-based ulcer which was   biopsied.  5.  Retroflexion appeared normal.    NARRATIVE:  Patient was placed in left lateral decubitus position  after an   adequate sedation was achieved by Dr. Nevarez.  Intubation of the gastroscope   was performed under direct visualization through the oropharynx.  Intubation   of esophagus achieved easily.  Entire esophagus appeared normal.  GE junction   appreciated at 38 cm.  Intubation of gastric cavity allowed evaluation of the   gastric bypass, which was noted.  Irrigation was used to help evaluate as much   of the mucosa as possible.  The pouch extended to approximately 10 cm.  There   are no staples or suture sites that were appreciated.  Both the efferent and   afferent limbs were evaluated and they appeared normal.  There is a blind loop   that is appreciated and retroflexion within the pouch.  No appreciable   mucosal abnormalities were appreciated.  Gastroscope was then gradually   straightened.  Further evaluation of the gastric pouch allowed appreciation   for a 6-mm gastric ulcer with  clean base.  No appreciable bleedings or visible   vessels noted.  It was biopsied.  Gastroscope was then gradually   straightened.  Excess air was removed and the gastroscope was withdrawn.  The   patient tolerated the procedure well.    RECOMMENDATIONS:  1.  Recommend awaiting pathology findings.  2.  Recommend continue on proton pump inhibitor therapy on a daily basis for   approximately 8-12 weeks.  3.  Recommend avoiding NSAIDs.  4.  Recommend followup in the outpatient setting with [facility] at   [phone].    In closing, I thank the referring physician for the opportunity of introducing   me to this patient.       ____________________________________     MD DARREN GORDON / NTS    DD:  05/30/2017 14:47:54  DT:  05/30/2017 18:23:31    D#:  [id number]  Job#:  [id number]

## 2017-06-01 ENCOUNTER — OFFICE VISIT (OUTPATIENT)
Dept: MEDICAL GROUP | Facility: CLINIC | Age: 57
End: 2017-06-01
Payer: COMMERCIAL

## 2017-06-01 ENCOUNTER — PATIENT OUTREACH (OUTPATIENT)
Dept: HEALTH INFORMATION MANAGEMENT | Facility: OTHER | Age: 57
End: 2017-06-01

## 2017-06-01 ENCOUNTER — TELEPHONE (OUTPATIENT)
Dept: MEDICAL GROUP | Facility: CLINIC | Age: 57
End: 2017-06-01

## 2017-06-01 VITALS
RESPIRATION RATE: 18 BRPM | OXYGEN SATURATION: 98 % | WEIGHT: 214.3 LBS | BODY MASS INDEX: 27.5 KG/M2 | DIASTOLIC BLOOD PRESSURE: 82 MMHG | HEIGHT: 74 IN | HEART RATE: 80 BPM | SYSTOLIC BLOOD PRESSURE: 128 MMHG | TEMPERATURE: 98.2 F

## 2017-06-01 VITALS
BODY MASS INDEX: 28.1 KG/M2 | OXYGEN SATURATION: 98 % | HEIGHT: 74 IN | RESPIRATION RATE: 18 BRPM | WEIGHT: 218.92 LBS | SYSTOLIC BLOOD PRESSURE: 169 MMHG | TEMPERATURE: 97.6 F | HEART RATE: 60 BPM | DIASTOLIC BLOOD PRESSURE: 101 MMHG

## 2017-06-01 DIAGNOSIS — K27.9 PUD (PEPTIC ULCER DISEASE): ICD-10-CM

## 2017-06-01 DIAGNOSIS — M54.50 LOW BACK PAIN WITHOUT SCIATICA, UNSPECIFIED BACK PAIN LATERALITY, UNSPECIFIED CHRONICITY: ICD-10-CM

## 2017-06-01 DIAGNOSIS — Z98.84 HISTORY OF ROUX-EN-Y GASTRIC BYPASS: ICD-10-CM

## 2017-06-01 PROBLEM — R10.9 ABDOMINAL PAIN: Status: RESOLVED | Noted: 2017-05-29 | Resolved: 2017-06-01

## 2017-06-01 LAB
ANION GAP SERPL CALC-SCNC: 9 MMOL/L (ref 0–11.9)
BASOPHILS # BLD AUTO: 0.3 % (ref 0–1.8)
BASOPHILS # BLD: 0.02 K/UL (ref 0–0.12)
BUN SERPL-MCNC: 9 MG/DL (ref 8–22)
CALCIUM SERPL-MCNC: 9.1 MG/DL (ref 8.4–10.2)
CHLORIDE SERPL-SCNC: 96 MMOL/L (ref 96–112)
CO2 SERPL-SCNC: 26 MMOL/L (ref 20–33)
CREAT SERPL-MCNC: 0.94 MG/DL (ref 0.5–1.4)
EOSINOPHIL # BLD AUTO: 0.03 K/UL (ref 0–0.51)
EOSINOPHIL NFR BLD: 0.4 % (ref 0–6.9)
ERYTHROCYTE [DISTWIDTH] IN BLOOD BY AUTOMATED COUNT: 36.5 FL (ref 35.9–50)
GFR SERPL CREATININE-BSD FRML MDRD: >60 ML/MIN/1.73 M 2
GLUCOSE BLD-MCNC: 265 MG/DL (ref 65–99)
GLUCOSE SERPL-MCNC: 243 MG/DL (ref 65–99)
HCT VFR BLD AUTO: 34 % (ref 42–52)
HGB BLD-MCNC: 12.4 G/DL (ref 14–18)
IMM GRANULOCYTES # BLD AUTO: 0.01 K/UL (ref 0–0.11)
IMM GRANULOCYTES NFR BLD AUTO: 0.1 % (ref 0–0.9)
LYMPHOCYTES # BLD AUTO: 3.49 K/UL (ref 1–4.8)
LYMPHOCYTES NFR BLD: 51.7 % (ref 22–41)
MCH RBC QN AUTO: 29.7 PG (ref 27–33)
MCHC RBC AUTO-ENTMCNC: 36.5 G/DL (ref 33.7–35.3)
MCV RBC AUTO: 81.5 FL (ref 81.4–97.8)
MONOCYTES # BLD AUTO: 0.51 K/UL (ref 0–0.85)
MONOCYTES NFR BLD AUTO: 7.6 % (ref 0–13.4)
NEUTROPHILS # BLD AUTO: 2.69 K/UL (ref 1.82–7.42)
NEUTROPHILS NFR BLD: 39.9 % (ref 44–72)
NRBC # BLD AUTO: 0 K/UL
NRBC BLD AUTO-RTO: 0 /100 WBC
PLATELET # BLD AUTO: 126 K/UL (ref 164–446)
PMV BLD AUTO: 11 FL (ref 9–12.9)
POTASSIUM SERPL-SCNC: 3.1 MMOL/L (ref 3.6–5.5)
RBC # BLD AUTO: 4.17 M/UL (ref 4.7–6.1)
SODIUM SERPL-SCNC: 131 MMOL/L (ref 135–145)
WBC # BLD AUTO: 6.8 K/UL (ref 4.8–10.8)

## 2017-06-01 PROCEDURE — A9270 NON-COVERED ITEM OR SERVICE: HCPCS | Performed by: INTERNAL MEDICINE

## 2017-06-01 PROCEDURE — 99214 OFFICE O/P EST MOD 30 MIN: CPT | Performed by: INTERNAL MEDICINE

## 2017-06-01 PROCEDURE — 700102 HCHG RX REV CODE 250 W/ 637 OVERRIDE(OP): Performed by: INTERNAL MEDICINE

## 2017-06-01 PROCEDURE — C9113 INJ PANTOPRAZOLE SODIUM, VIA: HCPCS | Performed by: INTERNAL MEDICINE

## 2017-06-01 PROCEDURE — 99239 HOSP IP/OBS DSCHRG MGMT >30: CPT | Performed by: INTERNAL MEDICINE

## 2017-06-01 PROCEDURE — 700111 HCHG RX REV CODE 636 W/ 250 OVERRIDE (IP): Performed by: INTERNAL MEDICINE

## 2017-06-01 PROCEDURE — 36415 COLL VENOUS BLD VENIPUNCTURE: CPT

## 2017-06-01 PROCEDURE — 80048 BASIC METABOLIC PNL TOTAL CA: CPT

## 2017-06-01 PROCEDURE — 85025 COMPLETE CBC W/AUTO DIFF WBC: CPT

## 2017-06-01 RX ORDER — POTASSIUM CHLORIDE 20 MEQ/1
40 TABLET, EXTENDED RELEASE ORAL 3 TIMES DAILY
Status: DISCONTINUED | OUTPATIENT
Start: 2017-06-01 | End: 2017-06-01 | Stop reason: HOSPADM

## 2017-06-01 RX ORDER — TRAMADOL HYDROCHLORIDE 50 MG/1
50 TABLET ORAL 2 TIMES DAILY
Qty: 30 TAB | Refills: 0 | Status: SHIPPED | OUTPATIENT
Start: 2017-06-01 | End: 2017-08-25 | Stop reason: SDUPTHER

## 2017-06-01 RX ORDER — POTASSIUM CHLORIDE 20 MEQ/1
40 TABLET, EXTENDED RELEASE ORAL ONCE
Status: DISCONTINUED | OUTPATIENT
Start: 2017-06-01 | End: 2017-06-01

## 2017-06-01 RX ORDER — NICOTINE POLACRILEX 4 MG/1
1 GUM, CHEWING ORAL DAILY
Qty: 30 TAB | Refills: 1 | Status: SHIPPED | OUTPATIENT
Start: 2017-06-01 | End: 2017-07-01

## 2017-06-01 RX ORDER — NICOTINE POLACRILEX 4 MG/1
1 GUM, CHEWING ORAL 2 TIMES DAILY
Qty: 60 TAB | Refills: 1 | Status: SHIPPED | OUTPATIENT
Start: 2017-06-01 | End: 2017-06-01

## 2017-06-01 RX ADMIN — LOSARTAN POTASSIUM 100 MG: 25 TABLET, FILM COATED ORAL at 09:39

## 2017-06-01 RX ADMIN — METRONIDAZOLE 500 MG: 500 TABLET ORAL at 06:29

## 2017-06-01 RX ADMIN — PANTOPRAZOLE SODIUM 40 MG: 40 INJECTION, POWDER, FOR SOLUTION INTRAVENOUS at 09:39

## 2017-06-01 RX ADMIN — LORATADINE 10 MG: 10 TABLET ORAL at 09:40

## 2017-06-01 RX ADMIN — POTASSIUM CHLORIDE 40 MEQ: 1500 TABLET, EXTENDED RELEASE ORAL at 09:40

## 2017-06-01 RX ADMIN — INSULIN LISPRO 2 UNITS: 100 INJECTION, SOLUTION INTRAVENOUS; SUBCUTANEOUS at 06:25

## 2017-06-01 RX ADMIN — VITAMIN D, TAB 1000IU (100/BT) 1000 UNITS: 25 TAB at 09:40

## 2017-06-01 RX ADMIN — CLOPIDOGREL BISULFATE 75 MG: 75 TABLET, FILM COATED ORAL at 09:39

## 2017-06-01 RX ADMIN — Medication 500 MCG: at 09:39

## 2017-06-01 RX ADMIN — MULTIVITAMIN TABLET 1 TABLET: TABLET at 09:39

## 2017-06-01 RX ADMIN — CHLORTHALIDONE 25 MG: 25 TABLET ORAL at 09:40

## 2017-06-01 ASSESSMENT — PAIN SCALES - GENERAL: PAINLEVEL_OUTOF10: 2

## 2017-06-01 NOTE — TELEPHONE ENCOUNTER
Telephone call returned from Amaury Dela Cruz wife, patient is still in the hospital, is likely to be discharged today. Will need rapid follow-up

## 2017-06-01 NOTE — MR AVS SNAPSHOT
"        Gil Hart   2017 2:40 PM   Office Visit   MRN: [id number]    Department:  [facility]   Dept Phone:  [phone]    Description:  Male : 1960   Provider:  Altaf Swenson D.O.           Reason for Visit     Hospital Follow-up           Allergies as of 2017     Allergen Noted Reactions    Pcn [Penicillins] 2013   Unspecified    Childhood allergie         You were diagnosed with     Low back pain without sciatica, unspecified back pain laterality, unspecified chronicity   [[id number]]       PUD (peptic ulcer disease)   [364573]         Vital Signs     Blood Pressure Pulse Temperature Respirations Height Weight    128/82 mmHg 80 36.8 °C (98.2 °F) 18 1.88 m (6' 2.02\") 97.206 kg (214 lb 4.8 oz)    Body Mass Index Oxygen Saturation Smoking Status             27.50 kg/m2 98% Never Smoker          Basic Information     Date Of Birth Sex Race Ethnicity Preferred Language    1960 Male White Non- English      Problem List              ICD-10-CM Priority Class Noted - Resolved    Low back pain radiating to right leg M54.5   12/3/2013 - Present    Mixed hyperlipidemia E78.2 Low  12/3/2013 - Present    Hypertension I10 Medium  12/3/2013 - Present    Vitamin D deficiency disease E55.9   12/3/2013 - Present    Personal history of alcoholism F10.21   12/3/2013 - Present    History of Amanda-en-Y gastric bypass Z98.84 Medium  12/3/2013 - Present    History of chronic pancreatitis Z87.19   12/3/2013 - Present    CLL (chronic lymphocytic leukemia) (CMS-HCC) C91.10 Medium  2015 - Present    Type 2 diabetes mellitus without complication (CMS-HCC) E11.9 Medium  2016 - Present    Family history of cerebral aneurysm Z82.49   2016 - Present    Stroke (CMS-HCC) I63.9 High  2016 - Present    Obesity (BMI 30-39.9) E66.9   2016 - Present    Chronic pancreatitis (CMS-HCC) K86.1 Medium  2017 - Present    Dilated cbd, acquired K83.8 Medium  2017 - Present   " Normocytic normochromic anemia D64.9 Low  5/29/2017 - Present    Hyponatremia E87.1 Medium  5/29/2017 - Present      Health Maintenance        Date Due Completion Dates    IMM HEP B VACCINE (1 of 3 - Primary Series) 1960 ---    IMM PNEUMOCOCCAL 19-64 (ADULT) HIGHEST RISK SERIES (2 of 3 - PPSV23) 1/4/2016 11/9/2015    URINE ACR / MICROALBUMIN 2/9/2017 2/9/2016, 2/6/2015, 12/18/2013    A1C SCREENING 8/9/2017 2/9/2017, 9/1/2016, 8/3/2016, 2/9/2016, 2/6/2015, 12/18/2013    FASTING LIPID PROFILE 9/1/2017 9/1/2016, 2/9/2016, 2/6/2015, 12/18/2013    RETINAL SCREENING 9/2/2017 9/2/2016, 3/26/2015    DIABETES MONOFILAMENT / LE EXAM 2/13/2018 2/13/2017, 2/9/2016 (Done), 2/10/2015 (Done), 1/17/2014 (Done)    Override on 2/9/2016: Done    Override on 2/10/2015: Done    Override on 1/17/2014: Done    SERUM CREATININE 5/31/2018 5/31/2017, 5/30/2017, 5/29/2017, 5/28/2017, 3/17/2017, 9/1/2016, 4/13/2016, 4/12/2016, 2/9/2016, 11/4/2015, 2/6/2015, 12/18/2013, 10/17/2013    COLONOSCOPY 5/11/2021 5/11/2011 (N/S)    Override on 5/11/2011: (N/S)    IMM DTaP/Tdap/Td Vaccine (2 - Td) 10/23/2025 10/23/2015            Current Immunizations     13-VALENT PCV PREVNAR 11/9/2015    Influenza TIV (IM) 12/3/2013    Influenza Vaccine Quad Inj (Pf) 11/9/2015    Tdap Vaccine 10/23/2015      Below and/or attached are the medications your provider expects you to take. Review all of your home medications and newly ordered medications with your provider and/or pharmacist. Follow medication instructions as directed by your provider and/or pharmacist. Please keep your medication list with you and share with your provider. Update the information when medications are discontinued, doses are changed, or new medications (including over-the-counter products) are added; and carry medication information at all times in the event of emergency situations     Allergies:  PCN - Unspecified               Medications  Valid as of: June 01, 2017 -  4:57 PM     Generic Name Brand Name Tablet Size Instructions for use    Chlorthalidone (Tab) HYGROTON 25 MG Take 1 Tab by mouth every day.        Cholecalciferol (Cap) Vitamin D3 2000 UNIT Take 1 Cap by mouth every day.        Clopidogrel Bisulfate (Tab) PLAVIX 75 MG Take 1 Tab by mouth every day.        Cyanocobalamin   Take 1 Tab by mouth every day.        GlipiZIDE (Tab) GLUCOTROL 5 MG TAKE 1 TABLET BY MOUTH TWICE DAILY        hyoscyamine-maalox plus-lidocaine viscous (GI COCKTAIL) GI COCKTAIL  Take 15 mL by mouth every 6 hours as needed.        Loratadine (Tab) CLARITIN 10 MG Take 10 mg by mouth every day.        Losartan Potassium (Tab) COZAAR 100 MG TAKE 1 TAB BY MOUTH DAILY.        Melatonin (Tab) Melatonin 10 MG Take 1 Tab by mouth every bedtime.        MetFORMIN HCl (Tab) GLUCOPHAGE 1000 MG TAKE 1 TABLET BY MOUTH TWICE DAILY WITH MEALS        Metoprolol Tartrate (Tab) LOPRESSOR 100 MG Take 100 mg by mouth every day.        Multiple Vitamins-Minerals (Tab) CENTRUM  Take 1 Tab by mouth every day.        Omeprazole (Tablet Delayed Response) PRILOSEC 20 MG Take 1 Tab by mouth every day for 30 days.        Pravastatin Sodium (Tab) PRAVACHOL 40 MG TAKE 1 TAB BY MOUTH AT BEDTIME        Tadalafil (Tab) CIALIS 20 MG Take 20 mg by mouth as needed for Erectile Dysfunction.        TraMADol HCl (Tab) ULTRAM 50 MG Take 1 Tab by mouth 2 Times a Day.        .                 Medicines prescribed today were sent to:     [id number] - [place], [place] - [address]    [address] [place] [address]    Phone: [phone] Fax: [phone]    Open 24 Hours?: No      Medication refill instructions:       If your prescription bottle indicates you have medication refills left, it is not necessary to call your provider’s office. Please contact your pharmacy and they will refill your medication.    If your prescription bottle indicates you do not have any refills left, you may request refills at any time through one of the following  ways: The online "[employer]" system (except Urgent Care), by calling your provider’s office, or by asking your pharmacy to contact your provider’s office with a refill request. Medication refills are processed only during regular business hours and may not be available until the next business day. Your provider may request additional information or to have a follow-up visit with you prior to refilling your medication.   *Please Note: Medication refills are assigned a new Rx number when refilled electronically. Your pharmacy may indicate that no refills were authorized even though a new prescription for the same medication is available at the pharmacy. Please request the medicine by name with the pharmacy before contacting your provider for a refill.           "[employer]" Access Code: Activation code not generated  Current "[employer]" Status: Active

## 2017-06-01 NOTE — PROGRESS NOTES
Went over all d/c instructions and script with pt.and wife.all questions answered.  Pt spoke with manager of or prior to d/c regarding abrasions to bridge of nose and beneath rt eye  Pt chose to walk out with wife.

## 2017-06-01 NOTE — DISCHARGE PLANNING
Patient discussed in morning rounds.  Patient's discharge plan continues to be to return home when medically able. No current SS needs noted.

## 2017-06-01 NOTE — DISCHARGE SUMMARY
Hospital Medicine Discharge Note     Admit Date:  5/28/2017       Discharge Date:   6/1/2017    Attending Physician:  Donna Garcia     Diagnoses (includes active and resolved):     Peptic ulcer disease    Abdominal pain acute POA: Yes    Hypertension POA: Yes    History of Amanda-en-Y gastric bypass POA: Yes    CLL (chronic lymphocytic leukemia) (CMS-HCC) POA: Yes    Type 2 diabetes mellitus without complication (CMS-HCC) POA: Yes    Chronic pancreatitis (CMS-HCC) POA: Yes    Dilated cbd, acquired POA: Yes    Hyponatremia POA: Yes    Mixed hyperlipidemia POA: Yes    Normocytic normochromic anemia POA: Yes    Essential hypertension POA: Yes      Chief Complain  abd pain    Hosiery of Present Illness  He is a 57-year-old male with a history of    chronic alcoholic pancreatitis, diabetes, who presents with epigastric pain.     This has been going on for 3 days now and that can be possibly related to    food. .   Detailed info pls see H&P by Dr. Whitaker    [facility] Summary (Brief Narrative):       Patient was admitted for abd pain. GI was consulted and due to his hx of gastric bypass hx,   EGD was performed. Patient was found to have a PUD with 6-mm clean-based ulcer in gastric   pouch and biopsy was performed. Patient was also treated with iv abx and supportive care.   He was treated also with iv protonix 40mg qd. His abd pain resolved and other condition stable.   He is willing to go home and f/u as outpatient with GI. Path result pending currently. Patient is   instructed to cont PPI at daily basis.     Consultants:      GI Noe Paniagua MD    Procedures:        ENDOSCOPIST:  Noe Paniagua MD     ASSISTANT:  Ms. Junior.     CIRCULATING NURSE:  Robert Mcmanus.     POSTOPERATIVE FINDINGS:  1.  Normal esophagus.  GE junction appreciated at approximately 38 cm, intact    and regular.  2.  A 10-cm gastric bypass pouch was appreciated, was intact.  No sutures or    staples were appreciated.  3.  Both the afferent and  efferent limbs of the small bowel were evaluated and   appeared normal.  4.  In the gastric pouch, there is a 6-mm clean-based ulcer which was    biopsied.  5.  Retroflexion appeared normal.    Discharge Medications:        (x)  Medication Reconciliation Completed       Medication List      START taking these medications       Instructions    omeprazole 20 MG Tbec delayed-release tablet   Commonly known as:  PRILOSEC    Take 1 Tab by mouth every day for 30 days.   Dose:  1 Tab         CONTINUE taking these medications       Instructions    CENTRUM Tabs    Take 1 Tab by mouth every day.   Dose:  1 Tab       chlorthalidone 25 MG Tabs   Last time this was given:  25 mg on 6/1/2017  9:40 AM   Commonly known as:  HYGROTON    Take 1 Tab by mouth every day.   Dose:  25 mg       CIALIS 20 MG tablet   Generic drug:  tadalafil    Take 20 mg by mouth as needed for Erectile Dysfunction.   Dose:  20 mg       clopidogrel 75 MG Tabs   Last time this was given:  75 mg on 6/1/2017  9:39 AM   Commonly known as:  PLAVIX    Take 1 Tab by mouth every day.   Dose:  75 mg       glipiZIDE 5 MG Tabs   Commonly known as:  GLUCOTROL    TAKE 1 TABLET BY MOUTH TWICE DAILY       loratadine 10 MG Tabs   Last time this was given:  10 mg on 6/1/2017  9:40 AM   Commonly known as:  CLARITIN    Take 10 mg by mouth every day.   Dose:  10 mg       losartan 100 MG Tabs   Last time this was given:  100 mg on 6/1/2017  9:39 AM   Commonly known as:  COZAAR    TAKE 1 TAB BY MOUTH DAILY.       Melatonin 10 MG Tabs    Take 1 Tab by mouth every bedtime.   Dose:  1 Tab       metformin 1000 MG tablet   Commonly known as:  GLUCOPHAGE    TAKE 1 TABLET BY MOUTH TWICE DAILY WITH MEALS       metoprolol 100 MG Tabs   Commonly known as:  LOPRESSOR    Take 100 mg by mouth every day.   Dose:  100 mg       pravastatin 40 MG tablet   Last time this was given:  40 mg on 5/31/2017  9:06 PM   Commonly known as:  PRAVACHOL    TAKE 1 TAB BY MOUTH AT BEDTIME       VITAMIN B-12 PO    Last time this was given:  500 mcg on 6/1/2017  9:39 AM    Take 1 Tab by mouth every day.   Dose:  1 Tab       Vitamin D3 2000 UNIT Caps    Take 1 Cap by mouth every day.   Dose:  1 Cap         STOP taking these medications          doxycycline 100 MG Tabs   Commonly known as:  VIBRAMYCIN       ibuprofen 200 MG Tabs   Commonly known as:  MOTRIN             Disposition:   Discharge home    Diet:   Healthy    Activity:   As tolerated    Code status:   Full code    Primary Care Provider:    Altaf Swenson D.O.    Follow up appointment details :      Gastroenterology digestive health associate outpatient  Altaf Swenson D.O.  [address]  [address]  [place] [address]  [phone]          Noe Paniagua M.D.  [address]  [address]  [place] [address]  [phone]      [facility]  left a message with your physician regarding need for follow up appointment. Please contact them directly if you do not hear back from them with in 1 day. Thank you    No future appointments.    Pending Studies:        None    Time spent on discharge day patient visit: >35 minutes    #################################################    Interval history/exam for day of discharge:    Filed Vitals:    05/31/17 1856 05/31/17 1930 06/01/17 0200 06/01/17 0700   BP: 147/96 170/101 112/64 169/101   Pulse: 91 82 80 60   Temp: 36.4 °C (97.5 °F) 36.9 °C (98.5 °F) 36.4 °C (97.5 °F) 36.4 °C (97.6 °F)   Resp: 18 18 18 18   Height:       Weight:       SpO2: 95%  96% 98%     Weight/BMI: Body mass index is 28.1 kg/(m^2).  Pulse Oximetry: 98 %, O2 (LPM): 0, O2 Delivery: None (Room Air)    Gen: AAOx3, NAD  Eyes: PELLA  Neck: no JVD, no lymphadenopathy  Cardia: RRR, no mrg  Lungs: CTAB, no rales, rhonci or wheezing  Abd: NABS, soft, non extended, no mass  EXT: No C/C/E, peripheral pulse 2+ b/l  Neuro: CN II-XII intact, non focal, reflex 2+ symmetrical  Skin: Intact, no lesion, warm  Psych: Appropriate.    Most Recent Labs:    Lab Results    Component Value Date/Time    WBC 6.8 06/01/2017 05:04 AM    RBC 4.17* 06/01/2017 05:04 AM    HEMOGLOBIN 12.4* 06/01/2017 05:04 AM    HEMATOCRIT 34.0* 06/01/2017 05:04 AM    MCV 81.5 06/01/2017 05:04 AM    MCH 29.7 06/01/2017 05:04 AM    MCHC 36.5* 06/01/2017 05:04 AM    MPV 11.0 06/01/2017 05:04 AM    NEUTROPHILS-POLYS 39.90* 06/01/2017 05:04 AM    LYMPHOCYTES 51.70* 06/01/2017 05:04 AM    MONOCYTES 7.60 06/01/2017 05:04 AM    EOSINOPHILS 0.40 06/01/2017 05:04 AM    BASOPHILS 0.30 06/01/2017 05:04 AM    HYPOCHROMIA 1+ 12/18/2013 09:09 AM    ANISOCYTOSIS 1+ 03/17/2017 01:13 PM      Lab Results   Component Value Date/Time    SODIUM 131* 06/01/2017 05:04 AM    POTASSIUM 3.1* 06/01/2017 05:04 AM    CHLORIDE 96 06/01/2017 05:04 AM    CO2 26 06/01/2017 05:04 AM    GLUCOSE 243* 06/01/2017 05:04 AM    BUN 9 06/01/2017 05:04 AM    CREATININE 0.94 06/01/2017 05:04 AM      Lab Results   Component Value Date/Time    ALT(SGPT) 15 05/28/2017 11:35 AM    AST(SGOT) 18 05/28/2017 11:35 AM    ALKALINE PHOSPHATASE 89 05/28/2017 11:35 AM    TOTAL BILIRUBIN 0.9 05/28/2017 11:35 AM    LIPASE 89* 05/28/2017 11:35 AM    ALBUMIN 4.3 05/28/2017 11:35 AM    GLOBULIN 3.0 05/28/2017 11:35 AM    INR 0.88 04/12/2016 02:27 PM     Lab Results   Component Value Date/Time    PT 12.1 04/12/2016 02:27 PM    INR 0.88 04/12/2016 02:27 PM        Imaging/ Testing:      [id number]   Final Result      1.  Mild peripancreatic edema suggest pancreatitis.      2.  Pancreatic ductal dilatation with multiple tiny pancreatic cysts and dilated sidebranches is likely related to the patient's history of chronic pancreatitis.      3.  Splenomegaly      US-GALLBLADDER   Final Result      1.  Mild dilatation of the common bile duct without choledocholithiasis identified. If clinically indicated, MRCP may be helpful for further evaluation.      2.  Heterogeneous pancreatic echotexture may be related to the patient's history of pancreatitis.          Instructions:       The patient was instructed to return to the ER in the event of worsening symptoms. I have counseled the patient on the importance of compliance and the patient has agreed to proceed with all medical recommendations and follow up plan indicated above.   The patient understands that all medications come with benefits and risks. Risks may include permanent injury or death and these risks can be minimized with close reassessment and monitoring.

## 2017-06-01 NOTE — DISCHARGE INSTRUCTIONS
Discharge Instructions    Discharged to home by car with relative. Discharged via wheelchair, hospital escort: Yes.  Special equipment needed: Not Applicable    Be sure to schedule a follow-up appointment with your primary care doctor or any specialists as instructed.     Discharge Plan:   Influenza Vaccine Indication: Not indicated: Previously immunized this influenza season and > 8 years of age    I understand that a diet low in cholesterol, fat, and sodium is recommended for good health. Unless I have been given specific instructions below for another diet, I accept this instruction as my diet prescription.   Other diet: diabetic    Special Instructions: None    · Is patient discharged on Warfarin / Coumadin?   No     · Is patient Post Blood Transfusion?  No    Depression / Suicide Risk    As you are discharged [facility], it is important to learn how to keep safe from harming yourself.    Recognize the warning signs:  · Abrupt changes in personality, positive or negative- including increase in energy   · Giving away possessions  · Change in eating patterns- significant weight changes-  positive or negative  · Change in sleeping patterns- unable to sleep or sleeping all the time   · Unwillingness or inability to communicate  · Depression  · Unusual sadness, discouragement and loneliness  · Talk of wanting to die  · Neglect of personal appearance   · Rebelliousness- reckless behavior  · Withdrawal from people/activities they love  · Confusion- inability to concentrate     If you or a loved one observes any of these behaviors or has concerns about self-harm, here's what you can do:  · Talk about it- your feelings and reasons for harming yourself  · Remove any means that you might use to hurt yourself (examples: pills, rope, extension cords, firearm)  · Get professional help from the community (Mental Health, Substance Abuse, psychological counseling)  · Do not be alone:Call your Safe Contact-  someone whom you trust who will be there for you.  · Call your local CRISIS HOTLINE 566-4523 or 245-513-7884  · Call your local Children's Mobile Crisis Response Team [place] (558) 775-4421 or www.ReadWorks  · Call the toll free National Suicide Prevention Hotlines   · National Suicide Prevention Lifeline 161-414-JZYL (7414)  · Nveloped Line Network 800-SUICIDE (793-8248)

## 2017-06-02 NOTE — PROGRESS NOTES
CC: Gil Hart is a 57 y.o. male is suffering from   Chief Complaint   Patient presents with   • Hospital Follow-up         SUBJECTIVE:  1. PUD (peptic ulcer disease)  Amaury here for follow-up has a history of peptic ulcer disease recently diagnosed with a newly found ulcer.  States he did been using ibuprofen because of back pain discomfort, wishes to avoid the use of any narcotics.     2. History of Amanda-en-Y gastric bypass  Patient with a history of bronchial Y gastric bypass predisposing him to developing ulceration.     3. Low back pain without sciatica, unspecified back pain laterality, unspecified chronicity  Patient with a history of low back pain discomfort, we've discussed various options including use of tramadol        Past social, family, history: , Lanie  Social History   Substance Use Topics   • Smoking status: Never Smoker    • Smokeless tobacco: Never Used   • Alcohol Use: No      Comment: Past history of alcoholism--last drink was 2010--attends Recovery Meetings at his [employer]         MEDICATIONS:    Current outpatient prescriptions:   •  omeprazole (PRILOSEC) 20 MG Tablet Delayed Response delayed-release tablet, Take 1 Tab by mouth every day for 30 days., Disp: 30 Tab, Rfl: 1  •  tramadol (ULTRAM) 50 MG Tab, Take 1 Tab by mouth 2 Times a Day., Disp: 30 Tab, Rfl: 0  •  hyoscyamine-maalox plus-lidocaine viscous (GI COCKTAIL), Take 15 mL by mouth every 6 hours as needed., Disp: 1 Bottle, Rfl: 3  •  Cholecalciferol (VITAMIN D3) 2000 UNIT Cap, Take 1 Cap by mouth every day., Disp: , Rfl:   •  Melatonin 10 MG Tab, Take 1 Tab by mouth every bedtime., Disp: , Rfl:   •  loratadine (CLARITIN) 10 MG Tab, Take 10 mg by mouth every day., Disp: , Rfl:   •  metoprolol (LOPRESSOR) 100 MG Tab, Take 100 mg by mouth every day., Disp: , Rfl:   •  tadalafil (CIALIS) 20 MG tablet, Take 20 mg by mouth as needed for Erectile Dysfunction., Disp: , Rfl:   •  pravastatin (PRAVACHOL) 40 MG tablet, TAKE 1 TAB BY  MOUTH AT BEDTIME, Disp: 90 Tab, Rfl: 3  •  chlorthalidone (HYGROTON) 25 MG Tab, Take 1 Tab by mouth every day., Disp: 90 Tab, Rfl: 3  •  glipiZIDE (GLUCOTROL) 5 MG Tab, TAKE 1 TABLET BY MOUTH TWICE DAILY, Disp: 180 Tab, Rfl: 3  •  metformin (GLUCOPHAGE) 1000 MG tablet, TAKE 1 TABLET BY MOUTH TWICE DAILY WITH MEALS, Disp: 180 Tab, Rfl: 3  •  losartan (COZAAR) 100 MG Tab, TAKE 1 TAB BY MOUTH DAILY., Disp: 90 Tab, Rfl: 3  •  clopidogrel (PLAVIX) 75 MG Tab, Take 1 Tab by mouth every day., Disp: 90 Tab, Rfl: 3  •  Cyanocobalamin (VITAMIN B-12 PO), Take 1 Tab by mouth every day., Disp: , Rfl:   •  Multiple Vitamins-Minerals (CENTRUM) TABS, Take 1 Tab by mouth every day., Disp: 100 Tab, Rfl: 3    PROBLEMS:  Patient Active Problem List    Diagnosis Date Noted   • Stroke (CMS-HCC) 04/12/2016     Priority: High   • Hyponatremia 05/29/2017     Priority: Medium   • Chronic pancreatitis (CMS-HCC) 05/28/2017     Priority: Medium   • Dilated cbd, acquired 05/28/2017     Priority: Medium   • Type 2 diabetes mellitus without complication (CMS-HCC) 02/05/2016     Priority: Medium   • CLL (chronic lymphocytic leukemia) (CMS-HCC) 11/09/2015     Priority: Medium   • Hypertension 12/03/2013     Priority: Medium   • History of Amanda-en-Y gastric bypass 12/03/2013     Priority: Medium   • Normocytic normochromic anemia 05/29/2017     Priority: Low   • Mixed hyperlipidemia 12/03/2013     Priority: Low   • Obesity (BMI 30-39.9) 09/02/2016   • Family history of cerebral aneurysm 02/09/2016   • Low back pain radiating to right leg 12/03/2013   • Vitamin D deficiency disease 12/03/2013   • Personal history of alcoholism 12/03/2013   • History of chronic pancreatitis 12/03/2013       REVIEW OF SYSTEMS:  Gen.:  No Nausea, Vomiting, fever, Chills.  Heart: No chest pain.  Lungs:  No shortness of Breath.  Psychological: Víctor unusual Anxiety depression     PHYSICAL EXAM   Constitutional: Alert, cooperative, not in acute distress.  Cardiovascular:   "Rate Rhythm is regular without murmurs rubs clicks.     Thorax & Lungs: Clear to auscultation, no wheezing, rhonchi, or rales  HENT: Normocephalic, Atraumatic.  Eyes: PERRLA, EOMI, Conjunctiva normal.   Neck: Trachia is midline no swelling of the thyroid.   Abdomin: Epigastric tenderness to palpation. .   Skin: Warm, Dry, No erythema, No rash.   Extremities: Atraumatic with symmetric distal pulses, No edema, No tenderness, No cyanosis, No clubbing.   Musculoskeletal: Documented history of low back pain discomfort  Neurologic: Alert & oriented x 3, cranial nerves II through XII are intact, Normal motor function, Normal sensory function, No focal deficits noted.   Psychiatric: Affect normal, Judgment normal, Mood normal.     VITAL SIGNS:/82 mmHg  Pulse 80  Temp(Src) 36.8 °C (98.2 °F)  Resp 18  Ht 1.88 m (6' 2.02\")  Wt 97.206 kg (214 lb 4.8 oz)  BMI 27.50 kg/m2  SpO2 98%    Labs: Reviewed    Assessment:                                                     Plan:    1. PUD (peptic ulcer disease)  Peptic ulcer disease patient's to continue on omeprazole as prescribed from the hospital or other PPI start GI cocktail  - hyoscyamine-maalox plus-lidocaine viscous (GI COCKTAIL); Take 15 mL by mouth every 6 hours as needed.  Dispense: 1 Bottle; Refill: 3    2. History of Amanda-en-Y gastric bypass  History of raw and Y gastric bypass patient absolutely cannot take nonsteroidal anti-inflammatories    3. Low back pain without sciatica, unspecified back pain laterality, unspecified chronicity  Start tramadol watch for side effects  - tramadol (ULTRAM) 50 MG Tab; Take 1 Tab by mouth 2 Times a Day.  Dispense: 30 Tab; Refill: 0            "

## 2017-06-26 ENCOUNTER — OFFICE VISIT (OUTPATIENT)
Dept: MEDICAL GROUP | Facility: CLINIC | Age: 57
End: 2017-06-26
Payer: COMMERCIAL

## 2017-06-26 VITALS
TEMPERATURE: 98.1 F | RESPIRATION RATE: 18 BRPM | SYSTOLIC BLOOD PRESSURE: 138 MMHG | DIASTOLIC BLOOD PRESSURE: 84 MMHG | HEART RATE: 70 BPM | HEIGHT: 74 IN | WEIGHT: 215.6 LBS | BODY MASS INDEX: 27.67 KG/M2 | OXYGEN SATURATION: 99 %

## 2017-06-26 DIAGNOSIS — Z79.899 MEDICAL MARIJUANA USE: ICD-10-CM

## 2017-06-26 DIAGNOSIS — C91.10 CLL (CHRONIC LYMPHOCYTIC LEUKEMIA) (HCC): ICD-10-CM

## 2017-06-26 DIAGNOSIS — M54.50 LOW BACK PAIN RADIATING TO RIGHT LEG: ICD-10-CM

## 2017-06-26 DIAGNOSIS — K26.9 NSAID-INDUCED DUODENAL ULCER: ICD-10-CM

## 2017-06-26 DIAGNOSIS — T39.395A NSAID-INDUCED DUODENAL ULCER: ICD-10-CM

## 2017-06-26 DIAGNOSIS — M79.604 LOW BACK PAIN RADIATING TO RIGHT LEG: ICD-10-CM

## 2017-06-26 PROCEDURE — 99214 OFFICE O/P EST MOD 30 MIN: CPT | Performed by: INTERNAL MEDICINE

## 2017-06-26 NOTE — MR AVS SNAPSHOT
"        Gil Hart   2017 8:20 AM   Office Visit   MRN: [id number]    Department:  [facility]   Dept Phone:  [phone]    Description:  Male : 1960   Provider:  Altaf Swenson D.O.           Reason for Visit     Follow-Up           Allergies as of 2017     Allergen Noted Reactions    Pcn [Penicillins] 2013   Unspecified    Childhood allergie         Vital Signs     Blood Pressure Pulse Temperature Respirations Height Weight    138/84 mmHg 70 36.7 °C (98.1 °F) 18 1.873 m (6' 1.74\") 97.796 kg (215 lb 9.6 oz)    Body Mass Index Oxygen Saturation Smoking Status             27.88 kg/m2 99% Never Smoker          Basic Information     Date Of Birth Sex Race Ethnicity Preferred Language    1960 Male White Non- English      Problem List              ICD-10-CM Priority Class Noted - Resolved    Low back pain radiating to right leg M54.5   12/3/2013 - Present    Mixed hyperlipidemia E78.2 Low  12/3/2013 - Present    Hypertension I10 Medium  12/3/2013 - Present    Vitamin D deficiency disease E55.9   12/3/2013 - Present    Personal history of alcoholism F10.21   12/3/2013 - Present    History of Amanda-en-Y gastric bypass Z98.84 Medium  12/3/2013 - Present    History of chronic pancreatitis Z87.19   12/3/2013 - Present    CLL (chronic lymphocytic leukemia) (CMS-HCC) C91.10 Medium  2015 - Present    Type 2 diabetes mellitus without complication (CMS-HCC) E11.9 Medium  2016 - Present    Family history of cerebral aneurysm Z82.49   2016 - Present    Stroke (CMS-HCC) I63.9 High  2016 - Present    Obesity (BMI 30-39.9) E66.9   2016 - Present    Chronic pancreatitis (CMS-HCC) K86.1 Medium  2017 - Present    Dilated cbd, acquired K83.8 Medium  2017 - Present    Normocytic normochromic anemia D64.9 Low  2017 - Present    Hyponatremia E87.1 Medium  2017 - Present      Health Maintenance        Date Due Completion Dates    IMM HEP B VACCINE (1 of " 3 - Primary Series) 1960 ---    IMM PNEUMOCOCCAL 19-64 (ADULT) HIGHEST RISK SERIES (2 of 3 - PPSV23) 1/4/2016 11/9/2015    URINE ACR / MICROALBUMIN 2/9/2017 2/9/2016, 2/6/2015, 12/18/2013    A1C SCREENING 8/9/2017 2/9/2017, 9/1/2016, 8/3/2016, 2/9/2016, 2/6/2015, 12/18/2013    FASTING LIPID PROFILE 9/1/2017 9/1/2016, 2/9/2016, 2/6/2015, 12/18/2013    RETINAL SCREENING 9/2/2017 9/2/2016, 3/26/2015    DIABETES MONOFILAMENT / LE EXAM 2/13/2018 2/13/2017, 2/9/2016 (Done), 2/10/2015 (Done), 1/17/2014 (Done)    Override on 2/9/2016: Done    Override on 2/10/2015: Done    Override on 1/17/2014: Done    SERUM CREATININE 6/1/2018 6/1/2017, 5/31/2017, 5/30/2017, 5/29/2017, 5/28/2017, 3/17/2017, 9/1/2016, 4/13/2016, 4/12/2016, 2/9/2016, 11/4/2015, 2/6/2015, 12/18/2013, 10/17/2013    COLONOSCOPY 5/11/2021 5/11/2011 (N/S)    Override on 5/11/2011: (N/S)    IMM DTaP/Tdap/Td Vaccine (2 - Td) 10/23/2025 10/23/2015            Current Immunizations     13-VALENT PCV PREVNAR 11/9/2015    Influenza TIV (IM) 12/3/2013    Influenza Vaccine Quad Inj (Pf) 11/9/2015    Tdap Vaccine 10/23/2015      Below and/or attached are the medications your provider expects you to take. Review all of your home medications and newly ordered medications with your provider and/or pharmacist. Follow medication instructions as directed by your provider and/or pharmacist. Please keep your medication list with you and share with your provider. Update the information when medications are discontinued, doses are changed, or new medications (including over-the-counter products) are added; and carry medication information at all times in the event of emergency situations     Allergies:  PCN - Unspecified               Medications  Valid as of: June 26, 2017 -  9:23 AM    Generic Name Brand Name Tablet Size Instructions for use    Chlorthalidone (Tab) HYGROTON 25 MG Take 1 Tab by mouth every day.        Cholecalciferol (Cap) Vitamin D3 2000 UNIT Take 1 Cap by  mouth every day.        Clopidogrel Bisulfate (Tab) PLAVIX 75 MG Take 1 Tab by mouth every day.        Cyanocobalamin   Take 1 Tab by mouth every day.        GlipiZIDE (Tab) GLUCOTROL 5 MG TAKE 1 TABLET BY MOUTH TWICE DAILY        hyoscyamine-maalox plus-lidocaine viscous (GI COCKTAIL) GI COCKTAIL  Take 15 mL by mouth every 6 hours as needed.        Loratadine (Tab) CLARITIN 10 MG Take 10 mg by mouth every day.        Losartan Potassium (Tab) COZAAR 100 MG TAKE 1 TAB BY MOUTH DAILY.        Melatonin (Tab) Melatonin 10 MG Take 1 Tab by mouth every bedtime.        MetFORMIN HCl (Tab) GLUCOPHAGE 1000 MG TAKE 1 TABLET BY MOUTH TWICE DAILY WITH MEALS        Metoprolol Tartrate (Tab) LOPRESSOR 100 MG Take 100 mg by mouth every day.        Multiple Vitamins-Minerals (Tab) CENTRUM  Take 1 Tab by mouth every day.        Omeprazole (Tablet Delayed Response) PRILOSEC 20 MG Take 1 Tab by mouth every day for 30 days.        Pravastatin Sodium (Tab) PRAVACHOL 40 MG TAKE 1 TAB BY MOUTH AT BEDTIME        Tadalafil (Tab) CIALIS 20 MG TAKE 1 TABLET ORALLY AS NEEDED        TraMADol HCl (Tab) ULTRAM 50 MG Take 1 Tab by mouth 2 Times a Day.        .                 Medicines prescribed today were sent to:     [id number] - [place], [place] - [address]    [address] [place] [address]    Phone: [phone] Fax: [phone]    Open 24 Hours?: No      Medication refill instructions:       If your prescription bottle indicates you have medication refills left, it is not necessary to call your provider’s office. Please contact your pharmacy and they will refill your medication.    If your prescription bottle indicates you do not have any refills left, you may request refills at any time through one of the following ways: The online [employer] (NIH) system (except Urgent Care), by calling your provider’s office, or by asking your pharmacy to contact your provider’s office with a refill request. Medication refills are processed only  during regular business hours and may not be available until the next business day. Your provider may request additional information or to have a follow-up visit with you prior to refilling your medication.   *Please Note: Medication refills are assigned a new Rx number when refilled electronically. Your pharmacy may indicate that no refills were authorized even though a new prescription for the same medication is available at the pharmacy. Please request the medicine by name with the pharmacy before contacting your provider for a refill.           Fitmo Access Code: Activation code not generated  Current Fitmo Status: Active

## 2017-06-26 NOTE — TELEPHONE ENCOUNTER
Was the patient seen in the last year in this department? No     Does patient have an active prescription for medications requested? Yes     Received Request Via:pharmacy

## 2017-06-26 NOTE — PROGRESS NOTES
CC: Gil Hart is a 57 y.o. male is suffering from   Chief Complaint   Patient presents with   • Follow-Up         SUBJECTIVE:  1. NSAID-induced duodenal ulcer  Patient's here for follow-up, we've discussed issues associated with NSAID-induced ulcers, patient cannot take nonsteroidal anti-inflammatories because of his previous history of gastric bypass. Patient is doing well states that his abdominal pain is resolved.     2. Low back pain radiating to right leg  Patient with a history of low back pain radiating to the right leg patient has been using tramadol but states ibuprofen works better.     3. CLL (chronic lymphocytic leukemia) (CMS-HCC)  Patient with a history of chronic lymphocytic leukemia clinically stable no change in medical therapy.     4. Medical marijuana use  Patient with a history of medical marijuana usage, paperwork was completed through the [facility]        Past social, family, history:   Social History   Substance Use Topics   • Smoking status: Never Smoker    • Smokeless tobacco: Never Used   • Alcohol Use: No      Comment: Past history of alcoholism--last drink was 2010--attends Recovery Meetings at his [employer]         MEDICATIONS:    Current outpatient prescriptions:   •  CIALIS 20 MG tablet, TAKE 1 TABLET ORALLY AS NEEDED, Disp: 18 Tab, Rfl: 2  •  omeprazole (PRILOSEC) 20 MG Tablet Delayed Response delayed-release tablet, Take 1 Tab by mouth every day for 30 days., Disp: 30 Tab, Rfl: 1  •  tramadol (ULTRAM) 50 MG Tab, Take 1 Tab by mouth 2 Times a Day., Disp: 30 Tab, Rfl: 0  •  Cholecalciferol (VITAMIN D3) 2000 UNIT Cap, Take 1 Cap by mouth every day., Disp: , Rfl:   •  Melatonin 10 MG Tab, Take 1 Tab by mouth every bedtime., Disp: , Rfl:   •  loratadine (CLARITIN) 10 MG Tab, Take 10 mg by mouth every day., Disp: , Rfl:   •  metoprolol (LOPRESSOR) 100 MG Tab, Take 100 mg by mouth every day., Disp: , Rfl:   •  pravastatin (PRAVACHOL) 40 MG tablet, TAKE 1 TAB BY MOUTH AT  BEDTIME, Disp: 90 Tab, Rfl: 3  •  chlorthalidone (HYGROTON) 25 MG Tab, Take 1 Tab by mouth every day., Disp: 90 Tab, Rfl: 3  •  glipiZIDE (GLUCOTROL) 5 MG Tab, TAKE 1 TABLET BY MOUTH TWICE DAILY, Disp: 180 Tab, Rfl: 3  •  metformin (GLUCOPHAGE) 1000 MG tablet, TAKE 1 TABLET BY MOUTH TWICE DAILY WITH MEALS, Disp: 180 Tab, Rfl: 3  •  losartan (COZAAR) 100 MG Tab, TAKE 1 TAB BY MOUTH DAILY., Disp: 90 Tab, Rfl: 3  •  clopidogrel (PLAVIX) 75 MG Tab, Take 1 Tab by mouth every day., Disp: 90 Tab, Rfl: 3  •  Cyanocobalamin (VITAMIN B-12 PO), Take 1 Tab by mouth every day., Disp: , Rfl:   •  Multiple Vitamins-Minerals (CENTRUM) TABS, Take 1 Tab by mouth every day., Disp: 100 Tab, Rfl: 3  •  hyoscyamine-maalox plus-lidocaine viscous (GI COCKTAIL), Take 15 mL by mouth every 6 hours as needed., Disp: 1 Bottle, Rfl: 3    PROBLEMS:  Patient Active Problem List    Diagnosis Date Noted   • Stroke (CMS-[facility]) 04/12/2016     Priority: High   • Hyponatremia 05/29/2017     Priority: Medium   • Chronic pancreatitis (CMS-[facility]) 05/28/2017     Priority: Medium   • Dilated cbd, acquired 05/28/2017     Priority: Medium   • Type 2 diabetes mellitus without complication (CMS-HCC) 02/05/2016     Priority: Medium   • CLL (chronic lymphocytic leukemia) (CMS-HCC) 11/09/2015     Priority: Medium   • Hypertension 12/03/2013     Priority: Medium   • History of Amanda-en-Y gastric bypass 12/03/2013     Priority: Medium   • Normocytic normochromic anemia 05/29/2017     Priority: Low   • Mixed hyperlipidemia 12/03/2013     Priority: Low   • NSAID-induced duodenal ulcer 06/26/2017   • Medical marijuana use 06/26/2017   • Obesity (BMI 30-39.9) 09/02/2016   • Family history of cerebral aneurysm 02/09/2016   • Low back pain radiating to right leg 12/03/2013   • Vitamin D deficiency disease 12/03/2013   • Personal history of alcoholism 12/03/2013   • History of chronic pancreatitis 12/03/2013       REVIEW OF SYSTEMS:  Gen.:  No Nausea, Vomiting, fever,  "Chills.  Heart: No chest pain.  Lungs:  No shortness of Breath.  Psychological: Víctor unusual Anxiety depression     PHYSICAL EXAM   Constitutional: Alert, cooperative, not in acute distress.  Cardiovascular:  Rate Rhythm is regular without murmurs rubs clicks.     Thorax & Lungs: Clear to auscultation, no wheezing, rhonchi, or rales  HENT: Normocephalic, Atraumatic.  Eyes: PERRLA, EOMI, Conjunctiva normal.   Neck: Trachia is midline no swelling of the thyroid.   Lymphatic: No lymphadenopathy noted.   Abdomin: Soft non-tender, no rebound, no guarding.   Neurologic: Alert & oriented x 3, cranial nerves II through XII are intact, Normal motor function, Normal sensory function, No focal deficits noted.   Psychiatric: Affect normal, Judgment normal, Mood normal.     VITAL SIGNS:/84 mmHg  Pulse 70  Temp(Src) 36.7 °C (98.1 °F)  Resp 18  Ht 1.873 m (6' 1.74\")  Wt 97.796 kg (215 lb 9.6 oz)  BMI 27.88 kg/m2  SpO2 99%    Labs: Reviewed    Assessment:                                                     Plan:    1. NSAID-induced duodenal ulcer  NSAID-induced ulcer secondary to the use of ibuprofen ineffective usage of tramadol history of previous addiction to opioids    2. Low back pain radiating to right leg  Patient's to continue to use tramadol until merit medical marijuana card is completed    3. CLL (chronic lymphocytic leukemia) (CMS-[facility])  Ongoing continue to monitor    4. Medical marijuana use  Paperwork completed through the [facility]            "

## 2017-06-27 RX ORDER — CLOPIDOGREL BISULFATE 75 MG/1
TABLET ORAL
Qty: 30 TAB | Refills: 11 | Status: SHIPPED | OUTPATIENT
Start: 2017-06-27 | End: 2017-07-18 | Stop reason: SDUPTHER

## 2017-07-18 ENCOUNTER — PATIENT MESSAGE (OUTPATIENT)
Dept: MEDICAL GROUP | Facility: CLINIC | Age: 57
End: 2017-07-18

## 2017-07-18 RX ORDER — CLOPIDOGREL BISULFATE 75 MG/1
75 TABLET ORAL
Qty: 30 TAB | Refills: 11 | Status: ON HOLD | OUTPATIENT
Start: 2017-07-18 | End: 2018-06-07

## 2017-07-18 NOTE — TELEPHONE ENCOUNTER
----- Message from Your Healthcare Team sent at 7/18/2017  8:14 AM PDT -----  Regarding: Prescription Question  Contact: [phone]  Dr. Swenson I need to get my Plavix refilled asap.  Could you respond to [facility] request for refill.    ThanksRobert

## 2017-07-18 NOTE — TELEPHONE ENCOUNTER
----- Message from Your Healthcare Team sent at 7/18/2017  8:14 AM PDT -----  Regarding: Prescription Question  Contact: [phone]  Dr. Swenson I need to get my Plavix refilled asap.  Could you respond to [facility] request for refill.    ThanksRobert

## 2017-08-19 DIAGNOSIS — Z79.891 CHRONIC USE OF OPIATE DRUGS THERAPEUTIC PURPOSES: ICD-10-CM

## 2017-08-25 ENCOUNTER — OFFICE VISIT (OUTPATIENT)
Dept: MEDICAL GROUP | Facility: CLINIC | Age: 57
End: 2017-08-25
Payer: COMMERCIAL

## 2017-08-25 VITALS
HEART RATE: 68 BPM | TEMPERATURE: 98.2 F | HEIGHT: 74 IN | BODY MASS INDEX: 28.36 KG/M2 | OXYGEN SATURATION: 95 % | WEIGHT: 221 LBS | SYSTOLIC BLOOD PRESSURE: 118 MMHG | DIASTOLIC BLOOD PRESSURE: 76 MMHG | RESPIRATION RATE: 18 BRPM

## 2017-08-25 DIAGNOSIS — R52 PAIN: ICD-10-CM

## 2017-08-25 DIAGNOSIS — E11.9 TYPE II DIABETES MELLITUS, WELL CONTROLLED (HCC): ICD-10-CM

## 2017-08-25 DIAGNOSIS — I10 ESSENTIAL HYPERTENSION: ICD-10-CM

## 2017-08-25 DIAGNOSIS — M54.50 LOW BACK PAIN WITHOUT SCIATICA, UNSPECIFIED BACK PAIN LATERALITY, UNSPECIFIED CHRONICITY: ICD-10-CM

## 2017-08-25 LAB
HBA1C MFR BLD: 8.7 % (ref ?–5.8)
INT CON NEG: NORMAL
INT CON POS: NORMAL

## 2017-08-25 PROCEDURE — 83036 HEMOGLOBIN GLYCOSYLATED A1C: CPT | Performed by: INTERNAL MEDICINE

## 2017-08-25 PROCEDURE — 99214 OFFICE O/P EST MOD 30 MIN: CPT | Performed by: INTERNAL MEDICINE

## 2017-08-25 RX ORDER — AMLODIPINE BESYLATE 5 MG/1
5 TABLET ORAL DAILY
Qty: 30 TAB | Refills: 6 | Status: ON HOLD | OUTPATIENT
Start: 2017-08-25 | End: 2018-06-06

## 2017-08-25 RX ORDER — TRAMADOL HYDROCHLORIDE 50 MG/1
50 TABLET ORAL 2 TIMES DAILY
Qty: 30 TAB | Refills: 0 | Status: SHIPPED | OUTPATIENT
Start: 2017-08-25 | End: 2017-11-01 | Stop reason: SDUPTHER

## 2017-08-25 RX ORDER — GLIPIZIDE 10 MG/1
10 TABLET ORAL 2 TIMES DAILY
Qty: 180 TAB | Refills: 3 | Status: SHIPPED | OUTPATIENT
Start: 2017-08-25 | End: 2017-10-11

## 2017-08-25 ASSESSMENT — PATIENT HEALTH QUESTIONNAIRE - PHQ9: CLINICAL INTERPRETATION OF PHQ2 SCORE: 0

## 2017-08-25 NOTE — MR AVS SNAPSHOT
"        Gil Jorgensenoney   2017 4:20 PM   Office Visit   MRN: [id number]    Department:  [facility]   Dept Phone:  [phone]    Description:  Male : 1960   Provider:  Altaf Swenson D.O.           Reason for Visit     Follow-Up           Allergies as of 2017     Allergen Noted Reactions    Pcn [Penicillins] 2013   Unspecified    Childhood allergie         Vital Signs     Blood Pressure Pulse Temperature Respirations Height Weight    118/76 mmHg 68 36.8 °C (98.2 °F) 18 1.88 m (6' 2\") 100.245 kg (221 lb)    Body Mass Index Oxygen Saturation Smoking Status             28.36 kg/m2 95% Never Smoker          Basic Information     Date Of Birth Sex Race Ethnicity Preferred Language    1960 Male White Non- English      Problem List              ICD-10-CM Priority Class Noted - Resolved    Low back pain radiating to right leg M54.5   12/3/2013 - Present    Mixed hyperlipidemia E78.2 Low  12/3/2013 - Present    Hypertension I10 Medium  12/3/2013 - Present    Vitamin D deficiency disease E55.9   12/3/2013 - Present    Personal history of alcoholism F10.21   12/3/2013 - Present    History of Amanda-en-Y gastric bypass Z98.84 Medium  12/3/2013 - Present    History of chronic pancreatitis Z87.19   12/3/2013 - Present    CLL (chronic lymphocytic leukemia) (CMS-HCC) C91.10 Medium  2015 - Present    Type 2 diabetes mellitus without complication (CMS-HCC) E11.9 Medium  2016 - Present    Family history of cerebral aneurysm Z82.49   2016 - Present    Stroke (CMS-HCC) I63.9 High  2016 - Present    Obesity (BMI 30-39.9) E66.9   2016 - Present    Chronic pancreatitis (CMS-HCC) K86.1 Medium  2017 - Present    Dilated cbd, acquired K83.8 Medium  2017 - Present    Normocytic normochromic anemia D64.9 Low  2017 - Present    Hyponatremia E87.1 Medium  2017 - Present    NSAID-induced duodenal ulcer T39.391A, K26.9   2017 - Present    Medical marijuana " use Z79.899   6/26/2017 - Present    Chronic use of opiate drugs therapeutic purposes Z79.891   8/19/2017 - Present      Health Maintenance        Date Due Completion Dates    IMM HEP B VACCINE (1 of 3 - Primary Series) 1960 ---    IMM PNEUMOCOCCAL 19-64 (ADULT) HIGHEST RISK SERIES (2 of 3 - PPSV23) 1/4/2016 11/9/2015    URINE ACR / MICROALBUMIN 2/9/2017 2/9/2016, 2/6/2015, 12/18/2013    A1C SCREENING 8/9/2017 2/9/2017, 9/1/2016, 8/3/2016, 2/9/2016, 2/6/2015, 12/18/2013    IMM INFLUENZA (1) 9/1/2017 11/9/2015, 12/3/2013    FASTING LIPID PROFILE 9/1/2017 9/1/2016, 2/9/2016, 2/6/2015, 12/18/2013    RETINAL SCREENING 9/2/2017 9/2/2016, 3/26/2015    DIABETES MONOFILAMENT / LE EXAM 2/13/2018 2/13/2017, 2/9/2016 (Done), 2/10/2015 (Done), 1/17/2014 (Done)    Override on 2/9/2016: Done    Override on 2/10/2015: Done    Override on 1/17/2014: Done    SERUM CREATININE 6/1/2018 6/1/2017, 5/31/2017, 5/30/2017, 5/29/2017, 5/28/2017, 3/17/2017, 9/1/2016, 4/13/2016, 4/12/2016, 2/9/2016, 11/4/2015, 2/6/2015, 12/18/2013, 10/17/2013    COLONOSCOPY 5/11/2021 5/11/2011 (N/S)    Override on 5/11/2011: (N/S)    IMM DTaP/Tdap/Td Vaccine (2 - Td) 10/23/2025 10/23/2015            Current Immunizations     13-VALENT PCV PREVNAR 11/9/2015    Influenza TIV (IM) 12/3/2013    Influenza Vaccine Quad Inj (Pf) 11/9/2015    Tdap Vaccine 10/23/2015      Below and/or attached are the medications your provider expects you to take. Review all of your home medications and newly ordered medications with your provider and/or pharmacist. Follow medication instructions as directed by your provider and/or pharmacist. Please keep your medication list with you and share with your provider. Update the information when medications are discontinued, doses are changed, or new medications (including over-the-counter products) are added; and carry medication information at all times in the event of emergency situations     Allergies:  PCN - Unspecified                 Medications  Valid as of: August 25, 2017 -  5:04 PM    Generic Name Brand Name Tablet Size Instructions for use    Chlorthalidone (Tab) HYGROTON 25 MG Take 1 Tab by mouth every day.        Cholecalciferol (Cap) Vitamin D3 2000 UNIT Take 1 Cap by mouth every day.        Clopidogrel Bisulfate (Tab) PLAVIX 75 MG Take 1 Tab by mouth every day. TAKE 1 TAB BY MOUTH EVERY DAY.        Cyanocobalamin   Take 1 Tab by mouth every day.        GlipiZIDE (Tab) GLUCOTROL 5 MG TAKE 1 TABLET BY MOUTH TWICE DAILY        hyoscyamine-maalox plus-lidocaine viscous (GI COCKTAIL) GI COCKTAIL  Take 15 mL by mouth every 6 hours as needed.        Loratadine (Tab) CLARITIN 10 MG Take 10 mg by mouth every day.        Losartan Potassium (Tab) COZAAR 100 MG TAKE 1 TAB BY MOUTH DAILY.        Melatonin (Tab) Melatonin 10 MG Take 1 Tab by mouth every bedtime.        MetFORMIN HCl (Tab) GLUCOPHAGE 1000 MG TAKE 1 TABLET BY MOUTH TWICE DAILY WITH MEALS        Metoprolol Tartrate (Tab) LOPRESSOR 100 MG Take 100 mg by mouth every day.        Multiple Vitamins-Minerals (Tab) CENTRUM  Take 1 Tab by mouth every day.        Pravastatin Sodium (Tab) PRAVACHOL 40 MG TAKE 1 TAB BY MOUTH AT BEDTIME        Tadalafil (Tab) CIALIS 20 MG TAKE 1 TABLET ORALLY AS NEEDED        TraMADol HCl (Tab) ULTRAM 50 MG Take 1 Tab by mouth 2 Times a Day.        .                 Medicines prescribed today were sent to:     [facility]Y #[id number] - [place], [place] - [address] [address]    [address] [place] [address]    Phone: [phone] Fax: [phone]    Open 24 Hours?: No      Medication refill instructions:       If your prescription bottle indicates you have medication refills left, it is not necessary to call your provider’s office. Please contact your pharmacy and they will refill your medication.    If your prescription bottle indicates you do not have any refills left, you may request refills at any time through one of the following ways: The online [employer] system  (except Urgent Care), by calling your provider’s office, or by asking your pharmacy to contact your provider’s office with a refill request. Medication refills are processed only during regular business hours and may not be available until the next business day. Your provider may request additional information or to have a follow-up visit with you prior to refilling your medication.   *Please Note: Medication refills are assigned a new Rx number when refilled electronically. Your pharmacy may indicate that no refills were authorized even though a new prescription for the same medication is available at the pharmacy. Please request the medicine by name with the pharmacy before contacting your provider for a refill.           [email or web address] Access Code: Activation code not generated  Current Wingu Status: Active

## 2017-08-26 NOTE — PROGRESS NOTES
CC: Gil Hart is a 57 y.o. male is suffering from   Chief Complaint   Patient presents with   • Follow-Up         SUBJECTIVE:  1. Type II diabetes mellitus, well controlled (CMS-HCC)  Patient with a history of type 2 diabetes poorly controlled encouraged better control, patient's 8.6 encourage diet and exercise will increase medication    2. Pain  Patient continues with the once a day use of tramadol prescription rewritten    3. Essential hypertension  Reasonably controlled    4. Low back pain without sciatica, unspecified back pain laterality, unspecified chronicity  Treated with tramadol    Chronic pain recheck:   Last dose of controlled substance: 2 days prior  Chronic pain treated with tramadol taken once a day    He  reports that he does not drink alcohol.  He  reports that he does not use illicit drugs.    Consequences of Chronic Opiate therapy:  (5 A's)  Analgesia: Compared to no treatment or prior treatment, pain is currently improved  Activity: improved  Adverse Events: He denies constipation, dry mouth, itchy skin, nausea, sedation and none  Aberrant Behaviors: He reports he is taking medication as prescribed and is not veering from agreed treatment regimen. There have been no inappropriate refills or lost/stolen meds reported.   Affect/Mood: Pain is not impacting patient's mood.  Patient denies depression/anxiety.    Nonnarcotic treatments that are being used: none.   Treatment goals discussed.    No order of CONTROLLED SUBSTANCE TREATMENT AGREEMENT is found.     UDS Summary                URINE DRUG SCREEN Overdue 1960         Most recent UDS reviewed today and is not consistent with prescribed medications.   Opioid Risk Score: No Value exists for the : [id number]    Interpretation of Opioid Risk Score   Score 0-3 = Low risk of abuse. Do UDS at least once per year.  Score 4-7 = Moderate risk of abuse. Do UDS 1-4 times per year.  Score 8+ = High risk of abuse. Refer to specialist.      I have  reviewed the medical records, the Prescription Monitoring Program and I have determined that controlled substance treatment is medically indicated.      Past social, family, history:   Social History   Substance Use Topics   • Smoking status: Never Smoker    • Smokeless tobacco: Never Used   • Alcohol Use: No      Comment: Past history of alcoholism--last drink was 2010--attends Recovery Meetings at his [employer]         MEDICATIONS:    Current outpatient prescriptions:   •  tramadol (ULTRAM) 50 MG Tab, Take 1 Tab by mouth 2 Times a Day., Disp: 30 Tab, Rfl: 0  •  amlodipine (NORVASC) 5 MG Tab, Take 1 Tab by mouth every day., Disp: 30 Tab, Rfl: 6  •  glipiZIDE (GLUCOTROL) 10 MG Tab, Take 1 Tab by mouth 2 times a day., Disp: 180 Tab, Rfl: 3  •  clopidogrel (PLAVIX) 75 MG Tab, Take 1 Tab by mouth every day. TAKE 1 TAB BY MOUTH EVERY DAY., Disp: 30 Tab, Rfl: 11  •  CIALIS 20 MG tablet, TAKE 1 TABLET ORALLY AS NEEDED, Disp: 18 Tab, Rfl: 2  •  Cholecalciferol (VITAMIN D3) 2000 UNIT Cap, Take 1 Cap by mouth every day., Disp: , Rfl:   •  Melatonin 10 MG Tab, Take 1 Tab by mouth every bedtime., Disp: , Rfl:   •  loratadine (CLARITIN) 10 MG Tab, Take 10 mg by mouth every day., Disp: , Rfl:   •  metoprolol (LOPRESSOR) 100 MG Tab, Take 100 mg by mouth every day., Disp: , Rfl:   •  pravastatin (PRAVACHOL) 40 MG tablet, TAKE 1 TAB BY MOUTH AT BEDTIME, Disp: 90 Tab, Rfl: 3  •  metformin (GLUCOPHAGE) 1000 MG tablet, TAKE 1 TABLET BY MOUTH TWICE DAILY WITH MEALS, Disp: 180 Tab, Rfl: 3  •  losartan (COZAAR) 100 MG Tab, TAKE 1 TAB BY MOUTH DAILY., Disp: 90 Tab, Rfl: 3  •  Cyanocobalamin (VITAMIN B-12 PO), Take 1 Tab by mouth every day., Disp: , Rfl:   •  Multiple Vitamins-Minerals (CENTRUM) TABS, Take 1 Tab by mouth every day., Disp: 100 Tab, Rfl: 3  •  chlorthalidone (HYGROTON) 25 MG Tab, Take 1 Tab by mouth every day., Disp: 90 Tab, Rfl: 3    PROBLEMS:  Patient Active Problem List    Diagnosis Date Noted   • Stroke (CMS-HCC) 04/12/2016  "    Priority: High   • Hyponatremia 05/29/2017     Priority: Medium   • Chronic pancreatitis (CMS-HCC) 05/28/2017     Priority: Medium   • Dilated cbd, acquired 05/28/2017     Priority: Medium   • Type 2 diabetes mellitus without complication (CMS-HCC) 02/05/2016     Priority: Medium   • CLL (chronic lymphocytic leukemia) (CMS-HCC) 11/09/2015     Priority: Medium   • Hypertension 12/03/2013     Priority: Medium   • History of Amanda-en-Y gastric bypass 12/03/2013     Priority: Medium   • Normocytic normochromic anemia 05/29/2017     Priority: Low   • Mixed hyperlipidemia 12/03/2013     Priority: Low   • Chronic use of opiate drugs therapeutic purposes 08/19/2017   • NSAID-induced duodenal ulcer 06/26/2017   • Medical marijuana use 06/26/2017   • Obesity (BMI 30-39.9) 09/02/2016   • Family history of cerebral aneurysm 02/09/2016   • Low back pain radiating to right leg 12/03/2013   • Vitamin D deficiency disease 12/03/2013   • Personal history of alcoholism 12/03/2013   • History of chronic pancreatitis 12/03/2013       REVIEW OF SYSTEMS:  Gen.:  No Nausea, Vomiting, fever, Chills.  Heart: No chest pain.  Lungs:  No shortness of Breath.  Psychological: Víctor unusual Anxiety depression     PHYSICAL EXAM   Constitutional: Alert, cooperative, not in acute distress.  Cardiovascular:  Rate Rhythm is regular without murmurs rubs clicks.     Thorax & Lungs: Clear to auscultation, no wheezing, rhonchi, or rales  HENT: Normocephalic, Atraumatic.  Eyes: PERRLA, EOMI, Conjunctiva normal.   Neck: Trachia is midline no swelling of the thyroid.   Lymphatic: No lymphadenopathy noted.   Musculoskeletal:Continued back pain  Neurologic: Alert & oriented x 3, cranial nerves II through XII are intact, Normal motor function, Normal sensory function, No focal deficits noted.   Psychiatric: Affect normal, Judgment normal, Mood normal.     VITAL SIGNS:/76 mmHg  Pulse 68  Temp(Src) 36.8 °C (98.2 °F)  Resp 18  Ht 1.88 m (6' 2\")  Wt " 100.245 kg (221 lb)  BMI 28.36 kg/m2  SpO2 95%    Labs: Reviewed    Assessment:                                                     Plan:    1. Type II diabetes mellitus, well controlled (CMS-HCC)  Increase Glucotrol from 5 mg twice a day to 10 mg twice a day  - MICROALBUMIN CREAT RATIO URINE (LAB COLLECT); Future  - POCT A1C  - glipiZIDE (GLUCOTROL) 10 MG Tab; Take 1 Tab by mouth 2 times a day.  Dispense: 180 Tab; Refill: 3  - HEMOGLOBIN A1C; Future      2. Pain  Pain agreement completed drug screen completed  - [facility] PAIN MANAGEMENT SCREEN; Future    3. Essential hypertension  Start Norvasc stopped chlorthalidone suspect this may be causing problems with patient's bone marrow  - amlodipine (NORVASC) 5 MG Tab; Take 1 Tab by mouth every day.  Dispense: 30 Tab; Refill: 6    4. Low back pain without sciatica, unspecified back pain laterality, unspecified chronicity  Continue tramadol  - tramadol (ULTRAM) 50 MG Tab; Take 1 Tab by mouth 2 Times a Day.  Dispense: 30 Tab; Refill: 0

## 2017-09-03 ENCOUNTER — PATIENT MESSAGE (OUTPATIENT)
Dept: MEDICAL GROUP | Facility: CLINIC | Age: 57
End: 2017-09-03

## 2017-09-03 DIAGNOSIS — M79.604 LOW BACK PAIN RADIATING TO RIGHT LEG: ICD-10-CM

## 2017-09-03 DIAGNOSIS — M54.50 LOW BACK PAIN RADIATING TO RIGHT LEG: ICD-10-CM

## 2017-09-23 DIAGNOSIS — I10 ESSENTIAL HYPERTENSION: ICD-10-CM

## 2017-09-25 RX ORDER — LOSARTAN POTASSIUM 100 MG/1
TABLET ORAL
Qty: 90 TAB | Refills: 3 | Status: SHIPPED | OUTPATIENT
Start: 2017-09-25 | End: 2018-10-05 | Stop reason: SDUPTHER

## 2017-09-25 RX ORDER — METOPROLOL TARTRATE 100 MG/1
50 TABLET ORAL 2 TIMES DAILY
Qty: 90 TAB | Refills: 3 | Status: SHIPPED | OUTPATIENT
Start: 2017-09-25 | End: 2018-06-01

## 2017-10-11 DIAGNOSIS — E11.9 TYPE 2 DIABETES MELLITUS WITHOUT COMPLICATION, UNSPECIFIED LONG TERM INSULIN USE STATUS: ICD-10-CM

## 2017-10-11 RX ORDER — GLIPIZIDE 5 MG/1
TABLET ORAL
Qty: 180 TAB | Refills: 3 | Status: ON HOLD | OUTPATIENT
Start: 2017-10-11 | End: 2018-06-06

## 2017-11-01 ENCOUNTER — HOSPITAL ENCOUNTER (OUTPATIENT)
Dept: RADIOLOGY | Facility: MEDICAL CENTER | Age: 57
End: 2017-11-01
Attending: INTERNAL MEDICINE
Payer: COMMERCIAL

## 2017-11-01 ENCOUNTER — OFFICE VISIT (OUTPATIENT)
Dept: MEDICAL GROUP | Facility: CLINIC | Age: 57
End: 2017-11-01
Payer: COMMERCIAL

## 2017-11-01 VITALS
BODY MASS INDEX: 28.35 KG/M2 | SYSTOLIC BLOOD PRESSURE: 110 MMHG | TEMPERATURE: 97.7 F | RESPIRATION RATE: 16 BRPM | DIASTOLIC BLOOD PRESSURE: 70 MMHG | HEART RATE: 76 BPM | HEIGHT: 74 IN | OXYGEN SATURATION: 96 % | WEIGHT: 220.9 LBS

## 2017-11-01 DIAGNOSIS — M54.50 LOW BACK PAIN WITHOUT SCIATICA, UNSPECIFIED BACK PAIN LATERALITY, UNSPECIFIED CHRONICITY: ICD-10-CM

## 2017-11-01 DIAGNOSIS — M79.604 RIGHT LEG PAIN: ICD-10-CM

## 2017-11-01 DIAGNOSIS — C91.10 CLL (CHRONIC LYMPHOCYTIC LEUKEMIA) (HCC): ICD-10-CM

## 2017-11-01 PROCEDURE — 73590 X-RAY EXAM OF LOWER LEG: CPT | Mod: RT

## 2017-11-01 PROCEDURE — 99214 OFFICE O/P EST MOD 30 MIN: CPT | Performed by: INTERNAL MEDICINE

## 2017-11-01 PROCEDURE — 72100 X-RAY EXAM L-S SPINE 2/3 VWS: CPT

## 2017-11-01 RX ORDER — TRAMADOL HYDROCHLORIDE 50 MG/1
50 TABLET ORAL 2 TIMES DAILY
Qty: 30 TAB | Refills: 0 | Status: SHIPPED | OUTPATIENT
Start: 2017-11-01 | End: 2017-11-01

## 2017-11-01 RX ORDER — TRAMADOL HYDROCHLORIDE 50 MG/1
50 TABLET ORAL EVERY 8 HOURS PRN
Qty: 60 TAB | Refills: 3 | Status: SHIPPED | OUTPATIENT
Start: 2017-11-01 | End: 2017-11-15 | Stop reason: SDUPTHER

## 2017-11-02 NOTE — PROGRESS NOTES
CC: Gil Hart is a 57 y.o. male is suffering from   Chief Complaint   Patient presents with   • Leg Pain     x1 week         SUBJECTIVE:  1. Low back pain without sciatica, unspecified back pain laterality, unspecified chronicity  A she is here for follow-up is suffering from back pain discomfort which is been ongoing, MRI was reviewed,  MRI is been ordered patient will need referral to neurosurgery for evaluation.     2. Right leg pain  Patient with ongoing right leg pain after hitting the edge of his leg at the tibia anteriorly, and explained to patient this is likely a bone bruise    3. CLL (chronic lymphocytic leukemia) (CMS-HCC)  Patient with a history of CLL which is clinically stable        Past social, family, history: , Lanie  Social History   Substance Use Topics   • Smoking status: Never Smoker   • Smokeless tobacco: Never Used   • Alcohol use No      Comment: Past history of alcoholism--last drink was 2010--attends Recovery Meetings at his [employer]         MEDICATIONS:    Current Outpatient Prescriptions:   •  tramadol (ULTRAM) 50 MG Tab, Take 1 Tab by mouth every 8 hours as needed for Mild Pain., Disp: 60 Tab, Rfl: 3  •  metformin (GLUCOPHAGE) 1000 MG tablet, TAKE 1 TABLET BY MOUTH TWICE DAILY WITH MEALS, Disp: 180 Tab, Rfl: 3  •  glipiZIDE (GLUCOTROL) 5 MG Tab, TAKE 1 TABLET BY MOUTH TWICE DAILY, Disp: 180 Tab, Rfl: 3  •  losartan (COZAAR) 100 MG Tab, TAKE 1 TAB BY MOUTH DAILY., Disp: 90 Tab, Rfl: 3  •  metoprolol (LOPRESSOR) 100 MG Tab, TAKE 0.5 TABS BY MOUTH 2 TIMES A DAY., Disp: 90 Tab, Rfl: 3  •  clopidogrel (PLAVIX) 75 MG Tab, Take 1 Tab by mouth every day. TAKE 1 TAB BY MOUTH EVERY DAY., Disp: 30 Tab, Rfl: 11  •  Cholecalciferol (VITAMIN D3) 2000 UNIT Cap, Take 1 Cap by mouth every day., Disp: , Rfl:   •  Melatonin 10 MG Tab, Take 1 Tab by mouth every bedtime., Disp: , Rfl:   •  loratadine (CLARITIN) 10 MG Tab, Take 10 mg by mouth every day., Disp: , Rfl:   •  pravastatin (PRAVACHOL) 40  MG tablet, TAKE 1 TAB BY MOUTH AT BEDTIME, Disp: 90 Tab, Rfl: 3  •  Cyanocobalamin (VITAMIN B-12 PO), Take 1 Tab by mouth every day., Disp: , Rfl:   •  Multiple Vitamins-Minerals (CENTRUM) TABS, Take 1 Tab by mouth every day., Disp: 100 Tab, Rfl: 3  •  amlodipine (NORVASC) 5 MG Tab, Take 1 Tab by mouth every day., Disp: 30 Tab, Rfl: 6  •  CIALIS 20 MG tablet, TAKE 1 TABLET ORALLY AS NEEDED, Disp: 18 Tab, Rfl: 2  •  metoprolol (LOPRESSOR) 100 MG Tab, Take 100 mg by mouth every day., Disp: , Rfl:   •  chlorthalidone (HYGROTON) 25 MG Tab, Take 1 Tab by mouth every day., Disp: 90 Tab, Rfl: 3    PROBLEMS:  Patient Active Problem List    Diagnosis Date Noted   • Stroke (CMS-HCC) 04/12/2016     Priority: High   • Hyponatremia 05/29/2017     Priority: Medium   • Chronic pancreatitis (CMS-HCC) 05/28/2017     Priority: Medium   • Dilated cbd, acquired 05/28/2017     Priority: Medium   • Type 2 diabetes mellitus without complication (CMS-HCC) 02/05/2016     Priority: Medium   • CLL (chronic lymphocytic leukemia) (CMS-HCC) 11/09/2015     Priority: Medium   • Hypertension 12/03/2013     Priority: Medium   • History of Amanda-en-Y gastric bypass 12/03/2013     Priority: Medium   • Normocytic normochromic anemia 05/29/2017     Priority: Low   • Mixed hyperlipidemia 12/03/2013     Priority: Low   • Chronic use of opiate drugs therapeutic purposes 08/19/2017   • NSAID-induced duodenal ulcer 06/26/2017   • Medical marijuana use 06/26/2017   • Obesity (BMI 30-39.9) 09/02/2016   • Family history of cerebral aneurysm 02/09/2016   • Low back pain radiating to right leg 12/03/2013   • Vitamin D deficiency disease 12/03/2013   • Personal history of alcoholism (CMS-HCC) 12/03/2013   • History of chronic pancreatitis 12/03/2013       REVIEW OF SYSTEMS:  Gen.:  No Nausea, Vomiting, fever, Chills.  Heart: No chest pain.  Lungs:  No shortness of Breath.  Psychological: Víctor unusual Anxiety depression     PHYSICAL EXAM   Constitutional: Alert,  "cooperative, not in acute distress.  Cardiovascular:  Rate Rhythm is regular without murmurs rubs clicks.     Thorax & Lungs: Clear to auscultation, no wheezing, rhonchi, or rales  HENT: Normocephalic, Atraumatic.  Eyes: PERRLA, EOMI, Conjunctiva normal.   Musculoskeletal:Pain anterior tibia consistent with a probable bone bruise.   Neurologic: Alert & oriented x 3, cranial nerves II through XII are intact, Normal motor function, Normal sensory function, No focal deficits noted.   Psychiatric: Affect normal, Judgment normal, Mood normal.     VITAL SIGNS:/70   Pulse 76   Temp 36.5 °C (97.7 °F)   Resp 16   Ht 1.88 m (6' 2\")   Wt 100.2 kg (220 lb 14.4 oz)   SpO2 96%   BMI 28.36 kg/m²     Labs: Reviewed    Assessment:                                                     Plan:    1. Low back pain without sciatica, unspecified back pain laterality, unspecified chronicity  MRI lumbar spine ordered referral written to Dr. Sam  - MR-LUMBAR SPINE-W/O; Future  - REFERRAL TO NEUROSURGERY    2. Right leg pain  Right leg pain x-ray ordered  - DX-TIBIA AND FIBULA RIGHT; Future    3. CLL (chronic lymphocytic leukemia) (CMS-HCC)  History of chronic lymphocytic leukemia repeat CBC ordered  - CBC WITH DIFFERENTIAL; Future        "

## 2017-11-15 RX ORDER — TRAMADOL HYDROCHLORIDE 50 MG/1
50 TABLET ORAL EVERY 8 HOURS PRN
Qty: 60 TAB | Refills: 3 | Status: SHIPPED | OUTPATIENT
Start: 2017-11-15 | End: 2018-04-09 | Stop reason: SDUPTHER

## 2017-12-04 ENCOUNTER — HOSPITAL ENCOUNTER (OUTPATIENT)
Dept: RADIOLOGY | Facility: MEDICAL CENTER | Age: 57
End: 2017-12-04
Attending: INTERNAL MEDICINE
Payer: COMMERCIAL

## 2017-12-04 DIAGNOSIS — M54.50 LOW BACK PAIN WITHOUT SCIATICA, UNSPECIFIED BACK PAIN LATERALITY, UNSPECIFIED CHRONICITY: ICD-10-CM

## 2017-12-04 PROCEDURE — 72148 MRI LUMBAR SPINE W/O DYE: CPT

## 2018-01-10 NOTE — TELEPHONE ENCOUNTER
Refill request for One touch Ultra Test Strips Qty 100 sig use daily as needed for high or low sugar

## 2018-01-26 ENCOUNTER — HOSPITAL ENCOUNTER (OUTPATIENT)
Dept: RADIOLOGY | Facility: MEDICAL CENTER | Age: 58
End: 2018-01-26
Attending: PHYSICIAN ASSISTANT
Payer: COMMERCIAL

## 2018-01-26 DIAGNOSIS — M54.16 LUMBAR RADICULOPATHY: ICD-10-CM

## 2018-01-26 PROCEDURE — 72100 X-RAY EXAM L-S SPINE 2/3 VWS: CPT

## 2018-02-09 ENCOUNTER — PATIENT MESSAGE (OUTPATIENT)
Dept: MEDICAL GROUP | Facility: CLINIC | Age: 58
End: 2018-02-09

## 2018-02-09 DIAGNOSIS — I10 ESSENTIAL HYPERTENSION: ICD-10-CM

## 2018-02-09 RX ORDER — CHLORTHALIDONE 25 MG/1
25 TABLET ORAL DAILY
Qty: 90 TAB | Refills: 3 | Status: SHIPPED | OUTPATIENT
Start: 2018-02-09 | End: 2019-03-03 | Stop reason: SDUPTHER

## 2018-02-09 NOTE — TELEPHONE ENCOUNTER
----- Message from Gil Hart sent at 2/9/2018  6:54 AM PST -----  Regarding: Prescription Question  Contact: [phone]  Pratik Swenson, can you please respond to [facility] request for a new prescription of Chlorothaldon (spl).  Thanks Robert

## 2018-02-09 NOTE — TELEPHONE ENCOUNTER
----- Message from Gil Hart sent at 2/9/2018  6:54 AM PST -----  Regarding: Prescription Question  Contact: [phone]  Pratik Swenson, can you please respond to [facility] request for a new prescription of Chlorothaldon (spl).  Thanks Robert

## 2018-04-09 ENCOUNTER — OFFICE VISIT (OUTPATIENT)
Dept: MEDICAL GROUP | Facility: CLINIC | Age: 58
End: 2018-04-09
Payer: COMMERCIAL

## 2018-04-09 VITALS
DIASTOLIC BLOOD PRESSURE: 76 MMHG | TEMPERATURE: 98.1 F | SYSTOLIC BLOOD PRESSURE: 110 MMHG | OXYGEN SATURATION: 96 % | RESPIRATION RATE: 12 BRPM | HEIGHT: 74 IN | WEIGHT: 222 LBS | HEART RATE: 80 BPM | BODY MASS INDEX: 28.49 KG/M2

## 2018-04-09 DIAGNOSIS — M54.50 LOW BACK PAIN RADIATING TO RIGHT LEG: ICD-10-CM

## 2018-04-09 DIAGNOSIS — M79.604 LOW BACK PAIN RADIATING TO RIGHT LEG: ICD-10-CM

## 2018-04-09 PROCEDURE — 99213 OFFICE O/P EST LOW 20 MIN: CPT | Performed by: INTERNAL MEDICINE

## 2018-04-09 RX ORDER — TRAMADOL HYDROCHLORIDE 50 MG/1
50 TABLET ORAL EVERY 8 HOURS PRN
Qty: 60 TAB | Refills: 3 | Status: SHIPPED | OUTPATIENT
Start: 2018-04-09 | End: 2018-05-09

## 2018-04-10 NOTE — PROGRESS NOTES
CC: Gil Hart is a 57 y.o. male is suffering from   Chief Complaint   Patient presents with   • Low Back Pain         SUBJECTIVE:  1. Low back pain radiating to right leg  Patient is here for follow-up continues to have problem is low back pain discomfort is using tramadol for this with good results.        Past social, family, history:   Social History   Substance Use Topics   • Smoking status: Never Smoker   • Smokeless tobacco: Never Used   • Alcohol use No      Comment: Past history of alcoholism--last drink was 2010--attends Recovery Meetings at his [employer]         MEDICATIONS:    Current Outpatient Prescriptions:   •  tramadol (ULTRAM) 50 MG Tab, Take 1 Tab by mouth every 8 hours as needed for Mild Pain for up to 30 days., Disp: 60 Tab, Rfl: 3  •  metformin (GLUCOPHAGE) 1000 MG tablet, TAKE 1 TABLET BY MOUTH TWICE DAILY WITH MEALS, Disp: 180 Tab, Rfl: 3  •  glipiZIDE (GLUCOTROL) 5 MG Tab, TAKE 1 TABLET BY MOUTH TWICE DAILY, Disp: 180 Tab, Rfl: 3  •  losartan (COZAAR) 100 MG Tab, TAKE 1 TAB BY MOUTH DAILY., Disp: 90 Tab, Rfl: 3  •  metoprolol (LOPRESSOR) 100 MG Tab, TAKE 0.5 TABS BY MOUTH 2 TIMES A DAY., Disp: 90 Tab, Rfl: 3  •  amlodipine (NORVASC) 5 MG Tab, Take 1 Tab by mouth every day., Disp: 30 Tab, Rfl: 6  •  clopidogrel (PLAVIX) 75 MG Tab, Take 1 Tab by mouth every day. TAKE 1 TAB BY MOUTH EVERY DAY., Disp: 30 Tab, Rfl: 11  •  Cholecalciferol (VITAMIN D3) 2000 UNIT Cap, Take 1 Cap by mouth every day., Disp: , Rfl:   •  loratadine (CLARITIN) 10 MG Tab, Take 10 mg by mouth every day., Disp: , Rfl:   •  pravastatin (PRAVACHOL) 40 MG tablet, TAKE 1 TAB BY MOUTH AT BEDTIME, Disp: 90 Tab, Rfl: 3  •  Cyanocobalamin (VITAMIN B-12 PO), Take 1 Tab by mouth every day., Disp: , Rfl:   •  Multiple Vitamins-Minerals (CENTRUM) TABS, Take 1 Tab by mouth every day., Disp: 100 Tab, Rfl: 3  •  tadalafil (CIALIS) 20 MG tablet, TAKE 1 TABLET ORALLY AS NEEDED, Disp: 18 Tab, Rfl: 2  •  chlorthalidone (HYGROTON) 25 MG  Tab, Take 1 Tab by mouth every day., Disp: 90 Tab, Rfl: 3  •  glucose blood strip, 1 Strip by Other route 3 times a day., Disp: 100 Strip, Rfl: 11  •  Melatonin 10 MG Tab, Take 1 Tab by mouth every bedtime., Disp: , Rfl:   •  metoprolol (LOPRESSOR) 100 MG Tab, Take 100 mg by mouth every day., Disp: , Rfl:     PROBLEMS:  Patient Active Problem List    Diagnosis Date Noted   • Stroke (CMS-HCC) 04/12/2016     Priority: High   • Hyponatremia 05/29/2017     Priority: Medium   • Chronic pancreatitis (CMS-HCC) 05/28/2017     Priority: Medium   • Dilated cbd, acquired 05/28/2017     Priority: Medium   • Type 2 diabetes mellitus without complication (CMS-HCC) 02/05/2016     Priority: Medium   • CLL (chronic lymphocytic leukemia) (CMS-HCC) 11/09/2015     Priority: Medium   • Hypertension 12/03/2013     Priority: Medium   • History of Amanda-en-Y gastric bypass 12/03/2013     Priority: Medium   • Normocytic normochromic anemia 05/29/2017     Priority: Low   • Mixed hyperlipidemia 12/03/2013     Priority: Low   • Chronic use of opiate drugs therapeutic purposes 08/19/2017   • NSAID-induced duodenal ulcer 06/26/2017   • Medical marijuana use 06/26/2017   • Obesity (BMI 30-39.9) 09/02/2016   • Family history of cerebral aneurysm 02/09/2016   • Low back pain radiating to right leg 12/03/2013   • Vitamin D deficiency disease 12/03/2013   • Personal history of alcoholism (CMS-HCC) 12/03/2013   • History of chronic pancreatitis 12/03/2013       REVIEW OF SYSTEMS:  Gen.:  No Nausea, Vomiting, fever, Chills.  Heart: No chest pain.  Lungs:  No shortness of Breath.  Psychological: Víctor unusual Anxiety depression     PHYSICAL EXAM   Constitutional: Alert, cooperative, not in acute distress.  Cardiovascular:  Rate Rhythm is regular without murmurs rubs clicks.     Thorax & Lungs: Clear to auscultation, no wheezing, rhonchi, or rales  HENT: Normocephalic, Atraumatic.  Eyes: PERRLA, EOMI, Conjunctiva normal.   Musculoskeletal: Continued  "complaints of low back pain discomfort  Neurologic: Alert & oriented x 3, cranial nerves II through XII are intact, Normal motor function, Normal sensory function, No focal deficits noted.   Psychiatric: Affect normal, Judgment normal, Mood normal.     VITAL SIGNS:/76   Pulse 80   Temp 36.7 °C (98.1 °F)   Resp 12   Ht 1.88 m (6' 2\")   Wt 100.7 kg (222 lb)   SpO2 96%   BMI 28.50 kg/m²     Labs: Reviewed    Assessment:                                                     Plan:    1. Low back pain radiating to right leg  Treated with tramadol, urine drug screen completed state drug task force reviewed and signed substance agreement  - tramadol (ULTRAM) 50 MG Tab; Take 1 Tab by mouth every 8 hours as needed for Mild Pain for up to 30 days.  Dispense: 60 Tab; Refill: 3  - CONSENT FOR OPIATE PRESCRIPTION  [facility]; Future  - CONTROLLED SUBSTANCE TREATMENT AGREEMENT        "

## 2018-05-24 ENCOUNTER — OFFICE VISIT (OUTPATIENT)
Dept: MEDICAL GROUP | Facility: CLINIC | Age: 58
End: 2018-05-24
Payer: COMMERCIAL

## 2018-05-24 VITALS
WEIGHT: 226 LBS | DIASTOLIC BLOOD PRESSURE: 76 MMHG | HEIGHT: 74 IN | RESPIRATION RATE: 14 BRPM | OXYGEN SATURATION: 95 % | HEART RATE: 90 BPM | SYSTOLIC BLOOD PRESSURE: 122 MMHG | BODY MASS INDEX: 29 KG/M2 | TEMPERATURE: 97.6 F

## 2018-05-24 DIAGNOSIS — E11.9 TYPE 2 DIABETES MELLITUS WITHOUT COMPLICATION, WITHOUT LONG-TERM CURRENT USE OF INSULIN (HCC): ICD-10-CM

## 2018-05-24 DIAGNOSIS — K86.1 OTHER CHRONIC PANCREATITIS (HCC): ICD-10-CM

## 2018-05-24 DIAGNOSIS — M54.41 CHRONIC RIGHT-SIDED LOW BACK PAIN WITH RIGHT-SIDED SCIATICA: ICD-10-CM

## 2018-05-24 DIAGNOSIS — G89.29 CHRONIC RIGHT-SIDED LOW BACK PAIN WITH RIGHT-SIDED SCIATICA: ICD-10-CM

## 2018-05-24 LAB
HBA1C MFR BLD: 8.7 % (ref ?–5.8)
INT CON NEG: NEGATIVE
INT CON POS: POSITIVE

## 2018-05-24 PROCEDURE — 83036 HEMOGLOBIN GLYCOSYLATED A1C: CPT | Performed by: INTERNAL MEDICINE

## 2018-05-24 PROCEDURE — 99214 OFFICE O/P EST MOD 30 MIN: CPT | Performed by: INTERNAL MEDICINE

## 2018-05-24 RX ORDER — PIOGLITAZONEHYDROCHLORIDE 15 MG/1
15 TABLET ORAL DAILY
Qty: 30 TAB | Refills: 11 | Status: SHIPPED | OUTPATIENT
Start: 2018-05-24 | End: 2019-05-19 | Stop reason: SDUPTHER

## 2018-05-24 NOTE — LETTER
May 24, 2018        Patient: Gil Hart   YOB: 1960   Date of Visit: 5/24/2018     Cammy Sam MD        Dear Cammy:    It is my opinion that Gil Hart is medically cleared for spinal cord stimulator implantation.    If you have any questions or concerns, please don't hesitate to call [phone].        Sincerely,        Severino Swenson D.O.  Board Certified General Internal Medicine.     [address] [id number] [phone] (Phone)  [phone] (Fax)

## 2018-05-25 NOTE — PROGRESS NOTES
CC: Gil Hart is a 58 y.o. male is suffering from   Chief Complaint   Patient presents with   • Annual Exam     surgical clearance - spinal cord stimulator         SUBJECTIVE:  1. Chronic right-sided low back pain with right-sided sciatica  Patient is here for follow-up, continues to experience right-sided low back pain with sciatica.  Patient underwent a trial for 5 days with a spinal cord stimulator.  Patient had dramatic relief with this.    2. Type 2 diabetes mellitus without complication, without long-term current use of insulin (HCC)  Patient with a history of type 2 diabetes not ideally controlled.  We discussed adding Actos 15 mg to his regimen.    3. Other chronic pancreatitis (HCC)  Patient was felt to possibly have problems with pancreatitis was later found to have apparently have problems with a peptic ulcer.  This appears to be stable        Past social, family, history: , Lanie  Social History   Substance Use Topics   • Smoking status: Never Smoker   • Smokeless tobacco: Never Used   • Alcohol use No      Comment: Past history of alcoholism--last drink was 2010--attends Recovery Meetings at his [employer]         MEDICATIONS:    Current Outpatient Prescriptions:   •  pioglitazone (ACTOS) 15 MG Tab, Take 1 Tab by mouth every day., Disp: 30 Tab, Rfl: 11  •  tadalafil (CIALIS) 20 MG tablet, TAKE 1 TABLET ORALLY AS NEEDED, Disp: 18 Tab, Rfl: 2  •  chlorthalidone (HYGROTON) 25 MG Tab, Take 1 Tab by mouth every day., Disp: 90 Tab, Rfl: 3  •  glucose blood strip, 1 Strip by Other route 3 times a day., Disp: 100 Strip, Rfl: 11  •  metformin (GLUCOPHAGE) 1000 MG tablet, TAKE 1 TABLET BY MOUTH TWICE DAILY WITH MEALS, Disp: 180 Tab, Rfl: 3  •  glipiZIDE (GLUCOTROL) 5 MG Tab, TAKE 1 TABLET BY MOUTH TWICE DAILY, Disp: 180 Tab, Rfl: 3  •  losartan (COZAAR) 100 MG Tab, TAKE 1 TAB BY MOUTH DAILY., Disp: 90 Tab, Rfl: 3  •  metoprolol (LOPRESSOR) 100 MG Tab, TAKE 0.5 TABS BY MOUTH 2 TIMES A DAY., Disp: 90 Tab,  Rfl: 3  •  amlodipine (NORVASC) 5 MG Tab, Take 1 Tab by mouth every day., Disp: 30 Tab, Rfl: 6  •  clopidogrel (PLAVIX) 75 MG Tab, Take 1 Tab by mouth every day. TAKE 1 TAB BY MOUTH EVERY DAY., Disp: 30 Tab, Rfl: 11  •  Cholecalciferol (VITAMIN D3) 2000 UNIT Cap, Take 1 Cap by mouth every day., Disp: , Rfl:   •  Melatonin 10 MG Tab, Take 1 Tab by mouth every bedtime., Disp: , Rfl:   •  loratadine (CLARITIN) 10 MG Tab, Take 10 mg by mouth every day., Disp: , Rfl:   •  metoprolol (LOPRESSOR) 100 MG Tab, Take 100 mg by mouth every day., Disp: , Rfl:   •  pravastatin (PRAVACHOL) 40 MG tablet, TAKE 1 TAB BY MOUTH AT BEDTIME, Disp: 90 Tab, Rfl: 3  •  Cyanocobalamin (VITAMIN B-12 PO), Take 1 Tab by mouth every day., Disp: , Rfl:   •  Multiple Vitamins-Minerals (CENTRUM) TABS, Take 1 Tab by mouth every day., Disp: 100 Tab, Rfl: 3    PROBLEMS:  Patient Active Problem List    Diagnosis Date Noted   • Stroke (HCC) 04/12/2016     Priority: High   • Hyponatremia 05/29/2017     Priority: Medium   • Chronic pancreatitis (HCC) 05/28/2017     Priority: Medium   • Dilated cbd, acquired 05/28/2017     Priority: Medium   • Type 2 diabetes mellitus without complication ([facility]) 02/05/2016     Priority: Medium   • CLL (chronic lymphocytic leukemia) ([facility]) 11/09/2015     Priority: Medium   • Hypertension 12/03/2013     Priority: Medium   • History of Amanda-en-Y gastric bypass 12/03/2013     Priority: Medium   • Normocytic normochromic anemia 05/29/2017     Priority: Low   • Mixed hyperlipidemia 12/03/2013     Priority: Low   • Chronic use of opiate drugs therapeutic purposes 08/19/2017   • NSAID-induced duodenal ulcer 06/26/2017   • Medical marijuana use 06/26/2017   • Obesity (BMI 30-39.9) 09/02/2016   • Family history of cerebral aneurysm 02/09/2016   • Low back pain radiating to right leg 12/03/2013   • Vitamin D deficiency disease 12/03/2013   • Personal history of alcoholism (HCC) 12/03/2013   • History of chronic pancreatitis 12/03/2013  "      REVIEW OF SYSTEMS:  Gen.:  No Nausea, Vomiting, fever, Chills.  Heart: No chest pain.  Lungs:  No shortness of Breath.  Psychological: Víctor unusual Anxiety depression     PHYSICAL EXAM   Constitutional: Alert, cooperative, not in acute distress.  Cardiovascular:  Rate Rhythm is regular without murmurs rubs clicks.     Thorax & Lungs: Clear to auscultation, no wheezing, rhonchi, or rales  HENT: Normocephalic, Atraumatic.  Eyes: PERRLA, EOMI, Conjunctiva normal.   Neck: Trachia is midline no swelling of the thyroid.   Neurologic: Alert & oriented x 3, cranial nerves II through XII are intact, Normal motor function, Normal sensory function, No focal deficits noted.   Psychiatric: Affect normal, Judgment normal, Mood normal.     VITAL SIGNS:/76   Pulse 90   Temp 36.4 °C (97.6 °F)   Resp 14   Ht 1.88 m (6' 2\")   Wt 102.5 kg (226 lb)   SpO2 95%   BMI 29.02 kg/m²     Labs: Reviewed    Assessment:                                                     Plan:    1. Chronic right-sided low back pain with right-sided sciatica  Agree with spinal cord stimulator EKG: Sinus rhythm, normal axis, possible early intraventricular conduction delay.    2. Type 2 diabetes mellitus without complication, without long-term current use of insulin ([facility])  Add Actos 15 mg recheck hemoglobin A1c in 3 months  - POCT  A1C  - pioglitazone (ACTOS) 15 MG Tab; Take 1 Tab by mouth every day.  Dispense: 30 Tab; Refill: 11    3. Other chronic pancreatitis ([facility])  History of pancreatitis likely due to the use of ibuprofen leading to stomach ulcer.        "

## 2018-05-30 ENCOUNTER — APPOINTMENT (OUTPATIENT)
Dept: ADMISSIONS | Facility: MEDICAL CENTER | Age: 58
End: 2018-05-30
Payer: COMMERCIAL

## 2018-06-01 ENCOUNTER — HOSPITAL ENCOUNTER (OUTPATIENT)
Dept: RADIOLOGY | Facility: MEDICAL CENTER | Age: 58
End: 2018-06-01
Attending: NEUROLOGICAL SURGERY | Admitting: NEUROLOGICAL SURGERY
Payer: COMMERCIAL

## 2018-06-01 DIAGNOSIS — Z01.811 PRE-OPERATIVE RESPIRATORY EXAMINATION: ICD-10-CM

## 2018-06-01 DIAGNOSIS — Z01.812 PRE-OPERATIVE LABORATORY EXAMINATION: ICD-10-CM

## 2018-06-01 LAB
25(OH)D3 SERPL-MCNC: 22 NG/ML (ref 30–100)
ANION GAP SERPL CALC-SCNC: 11 MMOL/L (ref 0–11.9)
ANISOCYTOSIS BLD QL SMEAR: ABNORMAL
APPEARANCE UR: CLEAR
APTT PPP: 30.6 SEC (ref 24.7–36)
BASOPHILS # BLD AUTO: 0 % (ref 0–1.8)
BASOPHILS # BLD: 0 K/UL (ref 0–0.12)
BILIRUB UR QL STRIP.AUTO: NEGATIVE
BUN SERPL-MCNC: 22 MG/DL (ref 8–22)
CALCIUM SERPL-MCNC: 9 MG/DL (ref 8.5–10.5)
CHLORIDE SERPL-SCNC: 96 MMOL/L (ref 96–112)
CO2 SERPL-SCNC: 28 MMOL/L (ref 20–33)
COLOR UR: YELLOW
CREAT SERPL-MCNC: 1.06 MG/DL (ref 0.5–1.4)
EOSINOPHIL # BLD AUTO: 0 K/UL (ref 0–0.51)
EOSINOPHIL NFR BLD: 0 % (ref 0–6.9)
ERYTHROCYTE [DISTWIDTH] IN BLOOD BY AUTOMATED COUNT: 41 FL (ref 35.9–50)
GLUCOSE SERPL-MCNC: 340 MG/DL (ref 65–99)
GLUCOSE UR STRIP.AUTO-MCNC: >=1000 MG/DL
HCT VFR BLD AUTO: 39.2 % (ref 42–52)
HGB BLD-MCNC: 13.5 G/DL (ref 14–18)
INR PPP: 0.99 (ref 0.87–1.13)
KETONES UR STRIP.AUTO-MCNC: NEGATIVE MG/DL
LEUKOCYTE ESTERASE UR QL STRIP.AUTO: NEGATIVE
LYMPHOCYTES # BLD AUTO: 9.54 K/UL (ref 1–4.8)
LYMPHOCYTES NFR BLD: 70.7 % (ref 22–41)
MANUAL DIFF BLD: NORMAL
MCH RBC QN AUTO: 30 PG (ref 27–33)
MCHC RBC AUTO-ENTMCNC: 34.4 G/DL (ref 33.7–35.3)
MCV RBC AUTO: 87.1 FL (ref 81.4–97.8)
MICRO URNS: ABNORMAL
MICROCYTES BLD QL SMEAR: ABNORMAL
MONOCYTES # BLD AUTO: 0.46 K/UL (ref 0–0.85)
MONOCYTES NFR BLD AUTO: 3.4 % (ref 0–13.4)
MORPHOLOGY BLD-IMP: NORMAL
NEUTROPHILS # BLD AUTO: 3.5 K/UL (ref 1.82–7.42)
NEUTROPHILS NFR BLD: 25.9 % (ref 44–72)
NITRITE UR QL STRIP.AUTO: NEGATIVE
NRBC # BLD AUTO: 0.04 K/UL
NRBC BLD-RTO: 0.3 /100 WBC
OVALOCYTES BLD QL SMEAR: NORMAL
PH UR STRIP.AUTO: 5.5 [PH]
PLATELET # BLD AUTO: 164 K/UL (ref 164–446)
PMV BLD AUTO: 11.4 FL (ref 9–12.9)
POIKILOCYTOSIS BLD QL SMEAR: NORMAL
POTASSIUM SERPL-SCNC: 3.5 MMOL/L (ref 3.6–5.5)
PROT UR QL STRIP: NEGATIVE MG/DL
PROTHROMBIN TIME: 12.8 SEC (ref 12–14.6)
RBC # BLD AUTO: 4.5 M/UL (ref 4.7–6.1)
RBC BLD AUTO: PRESENT
RBC UR QL AUTO: NEGATIVE
SCHISTOCYTES BLD QL SMEAR: NORMAL
SMUDGE CELLS BLD QL SMEAR: NORMAL
SODIUM SERPL-SCNC: 135 MMOL/L (ref 135–145)
SP GR UR STRIP.AUTO: 1.03
UROBILINOGEN UR STRIP.AUTO-MCNC: 0.2 MG/DL
WBC # BLD AUTO: 13.5 K/UL (ref 4.8–10.8)

## 2018-06-01 PROCEDURE — 85007 BL SMEAR W/DIFF WBC COUNT: CPT

## 2018-06-01 PROCEDURE — 82306 VITAMIN D 25 HYDROXY: CPT

## 2018-06-01 PROCEDURE — 36415 COLL VENOUS BLD VENIPUNCTURE: CPT

## 2018-06-01 PROCEDURE — 85730 THROMBOPLASTIN TIME PARTIAL: CPT

## 2018-06-01 PROCEDURE — 81003 URINALYSIS AUTO W/O SCOPE: CPT

## 2018-06-01 PROCEDURE — 85027 COMPLETE CBC AUTOMATED: CPT

## 2018-06-01 PROCEDURE — 80048 BASIC METABOLIC PNL TOTAL CA: CPT

## 2018-06-01 PROCEDURE — 71045 X-RAY EXAM CHEST 1 VIEW: CPT

## 2018-06-01 PROCEDURE — 85610 PROTHROMBIN TIME: CPT

## 2018-06-04 ENCOUNTER — APPOINTMENT (OUTPATIENT)
Dept: RADIOLOGY | Facility: MEDICAL CENTER | Age: 58
End: 2018-06-04
Attending: PHYSICIAN ASSISTANT
Payer: COMMERCIAL

## 2018-06-04 DIAGNOSIS — G89.4 CHRONIC PAIN DISORDER: ICD-10-CM

## 2018-06-04 PROCEDURE — 72146 MRI CHEST SPINE W/O DYE: CPT

## 2018-06-06 ENCOUNTER — APPOINTMENT (OUTPATIENT)
Dept: RADIOLOGY | Facility: MEDICAL CENTER | Age: 58
End: 2018-06-06
Attending: NEUROLOGICAL SURGERY
Payer: COMMERCIAL

## 2018-06-06 ENCOUNTER — HOSPITAL ENCOUNTER (OUTPATIENT)
Facility: MEDICAL CENTER | Age: 58
End: 2018-06-07
Attending: NEUROLOGICAL SURGERY | Admitting: NEUROLOGICAL SURGERY
Payer: COMMERCIAL

## 2018-06-06 DIAGNOSIS — G89.18 POSTOPERATIVE PAIN: ICD-10-CM

## 2018-06-06 LAB — GLUCOSE BLD-MCNC: 160 MG/DL (ref 65–99)

## 2018-06-06 PROCEDURE — A9270 NON-COVERED ITEM OR SERVICE: HCPCS | Performed by: PHYSICIAN ASSISTANT

## 2018-06-06 PROCEDURE — C1713 ANCHOR/SCREW BN/BN,TIS/BN: HCPCS | Performed by: NEUROLOGICAL SURGERY

## 2018-06-06 PROCEDURE — C1778 LEAD, NEUROSTIMULATOR: HCPCS | Performed by: NEUROLOGICAL SURGERY

## 2018-06-06 PROCEDURE — 72020 X-RAY EXAM OF SPINE 1 VIEW: CPT

## 2018-06-06 PROCEDURE — G0378 HOSPITAL OBSERVATION PER HR: HCPCS

## 2018-06-06 PROCEDURE — 160041 HCHG SURGERY MINUTES - EA ADDL 1 MIN LEVEL 4: Performed by: NEUROLOGICAL SURGERY

## 2018-06-06 PROCEDURE — C1820 GENERATOR NEURO RECHG BAT SY: HCPCS | Performed by: NEUROLOGICAL SURGERY

## 2018-06-06 PROCEDURE — 502000 HCHG MISC OR IMPLANTS RC 0278: Performed by: NEUROLOGICAL SURGERY

## 2018-06-06 PROCEDURE — 160035 HCHG PACU - 1ST 60 MINS PHASE I: Performed by: NEUROLOGICAL SURGERY

## 2018-06-06 PROCEDURE — 160029 HCHG SURGERY MINUTES - 1ST 30 MINS LEVEL 4: Performed by: NEUROLOGICAL SURGERY

## 2018-06-06 PROCEDURE — 110454 HCHG SHELL REV 250: Performed by: NEUROLOGICAL SURGERY

## 2018-06-06 PROCEDURE — A9270 NON-COVERED ITEM OR SERVICE: HCPCS

## 2018-06-06 PROCEDURE — 700102 HCHG RX REV CODE 250 W/ 637 OVERRIDE(OP)

## 2018-06-06 PROCEDURE — 502240 HCHG MISC OR SUPPLY RC 0272: Performed by: NEUROLOGICAL SURGERY

## 2018-06-06 PROCEDURE — 700102 HCHG RX REV CODE 250 W/ 637 OVERRIDE(OP): Performed by: PHYSICIAN ASSISTANT

## 2018-06-06 PROCEDURE — 160009 HCHG ANES TIME/MIN: Performed by: NEUROLOGICAL SURGERY

## 2018-06-06 PROCEDURE — 700101 HCHG RX REV CODE 250: Performed by: PHYSICIAN ASSISTANT

## 2018-06-06 PROCEDURE — 503195 HCHG SEALER, BIPOLAR AQUAMANTYS: Performed by: NEUROLOGICAL SURGERY

## 2018-06-06 PROCEDURE — 82962 GLUCOSE BLOOD TEST: CPT

## 2018-06-06 PROCEDURE — 700111 HCHG RX REV CODE 636 W/ 250 OVERRIDE (IP)

## 2018-06-06 PROCEDURE — 700112 HCHG RX REV CODE 229: Performed by: PHYSICIAN ASSISTANT

## 2018-06-06 PROCEDURE — C1787 PATIENT PROGR, NEUROSTIM: HCPCS | Performed by: NEUROLOGICAL SURGERY

## 2018-06-06 PROCEDURE — 500002 HCHG ADHESIVE, DERMABOND: Performed by: NEUROLOGICAL SURGERY

## 2018-06-06 PROCEDURE — 160002 HCHG RECOVERY MINUTES (STAT): Performed by: NEUROLOGICAL SURGERY

## 2018-06-06 PROCEDURE — 160048 HCHG OR STATISTICAL LEVEL 1-5: Performed by: NEUROLOGICAL SURGERY

## 2018-06-06 PROCEDURE — 700101 HCHG RX REV CODE 250

## 2018-06-06 PROCEDURE — 160036 HCHG PACU - EA ADDL 30 MINS PHASE I: Performed by: NEUROLOGICAL SURGERY

## 2018-06-06 PROCEDURE — 501838 HCHG SUTURE GENERAL: Performed by: NEUROLOGICAL SURGERY

## 2018-06-06 PROCEDURE — 700111 HCHG RX REV CODE 636 W/ 250 OVERRIDE (IP): Performed by: PHYSICIAN ASSISTANT

## 2018-06-06 DEVICE — IMPLANTABLE DEVICE: Type: IMPLANTABLE DEVICE | Site: BACK | Status: FUNCTIONAL

## 2018-06-06 DEVICE — PLATE NC DOGBONE 2-HOLE W/ TAB (6NCX4=24): Type: IMPLANTABLE DEVICE | Site: BACK | Status: FUNCTIONAL

## 2018-06-06 DEVICE — SCREW STRYK NC 1.5X5MM (6NCX40=240) (80EA/PK) CONSIGNED QTY 240 PRE-LOAD: Type: IMPLANTABLE DEVICE | Site: BACK | Status: FUNCTIONAL

## 2018-06-06 RX ORDER — BUPIVACAINE HYDROCHLORIDE AND EPINEPHRINE 5; 5 MG/ML; UG/ML
INJECTION, SOLUTION EPIDURAL; INTRACAUDAL; PERINEURAL
Status: DISCONTINUED | OUTPATIENT
Start: 2018-06-06 | End: 2018-06-06 | Stop reason: HOSPADM

## 2018-06-06 RX ORDER — MAGNESIUM HYDROXIDE 1200 MG/15ML
LIQUID ORAL
Status: COMPLETED | OUTPATIENT
Start: 2018-06-06 | End: 2018-06-06

## 2018-06-06 RX ORDER — TRAMADOL HYDROCHLORIDE 50 MG/1
50 TABLET ORAL
Status: ON HOLD | COMMUNITY
End: 2018-06-07

## 2018-06-06 RX ORDER — PIOGLITAZONEHYDROCHLORIDE 30 MG/1
15 TABLET ORAL DAILY
Status: DISCONTINUED | OUTPATIENT
Start: 2018-06-06 | End: 2018-06-07 | Stop reason: HOSPADM

## 2018-06-06 RX ORDER — SODIUM CHLORIDE AND POTASSIUM CHLORIDE 150; 900 MG/100ML; MG/100ML
INJECTION, SOLUTION INTRAVENOUS CONTINUOUS
Status: DISCONTINUED | OUTPATIENT
Start: 2018-06-06 | End: 2018-06-07 | Stop reason: HOSPADM

## 2018-06-06 RX ORDER — POLYETHYLENE GLYCOL 3350 17 G/17G
1 POWDER, FOR SOLUTION ORAL 2 TIMES DAILY PRN
Status: DISCONTINUED | OUTPATIENT
Start: 2018-06-06 | End: 2018-06-07 | Stop reason: HOSPADM

## 2018-06-06 RX ORDER — AMOXICILLIN 250 MG
1 CAPSULE ORAL NIGHTLY
Status: DISCONTINUED | OUTPATIENT
Start: 2018-06-06 | End: 2018-06-07 | Stop reason: HOSPADM

## 2018-06-06 RX ORDER — ACETAMINOPHEN 500 MG
TABLET ORAL
Status: COMPLETED
Start: 2018-06-06 | End: 2018-06-06

## 2018-06-06 RX ORDER — OXYCODONE HYDROCHLORIDE 10 MG/1
5-10 TABLET ORAL EVERY 4 HOURS PRN
Status: DISCONTINUED | OUTPATIENT
Start: 2018-06-06 | End: 2018-06-07 | Stop reason: HOSPADM

## 2018-06-06 RX ORDER — SODIUM CHLORIDE 9 MG/ML
INJECTION, SOLUTION INTRAVENOUS CONTINUOUS
Status: DISCONTINUED | OUTPATIENT
Start: 2018-06-06 | End: 2018-06-06

## 2018-06-06 RX ORDER — LORATADINE 10 MG/1
10 TABLET ORAL DAILY
Status: DISCONTINUED | OUTPATIENT
Start: 2018-06-06 | End: 2018-06-07 | Stop reason: HOSPADM

## 2018-06-06 RX ORDER — ACETAMINOPHEN 500 MG
1000 TABLET ORAL
COMMUNITY

## 2018-06-06 RX ORDER — GLIPIZIDE 10 MG/1
10 TABLET ORAL 2 TIMES DAILY
COMMUNITY
End: 2019-06-05

## 2018-06-06 RX ORDER — AMITRIPTYLINE HYDROCHLORIDE 75 MG/1
75 TABLET ORAL NIGHTLY
Status: DISCONTINUED | OUTPATIENT
Start: 2018-06-06 | End: 2018-06-07 | Stop reason: HOSPADM

## 2018-06-06 RX ORDER — VANCOMYCIN HCL 900 MCG/MG
POWDER (GRAM) MISCELLANEOUS
Status: DISCONTINUED | OUTPATIENT
Start: 2018-06-06 | End: 2018-06-06 | Stop reason: HOSPADM

## 2018-06-06 RX ORDER — PRAVASTATIN SODIUM 20 MG
40 TABLET ORAL DAILY
Status: DISCONTINUED | OUTPATIENT
Start: 2018-06-06 | End: 2018-06-07 | Stop reason: HOSPADM

## 2018-06-06 RX ORDER — CHLORTHALIDONE 25 MG/1
25 TABLET ORAL DAILY
Status: DISCONTINUED | OUTPATIENT
Start: 2018-06-06 | End: 2018-06-07 | Stop reason: HOSPADM

## 2018-06-06 RX ORDER — AMITRIPTYLINE HYDROCHLORIDE 75 MG/1
75 TABLET ORAL NIGHTLY
COMMUNITY
End: 2019-06-05

## 2018-06-06 RX ORDER — CEFAZOLIN SODIUM 2 G/100ML
2 INJECTION, SOLUTION INTRAVENOUS EVERY 8 HOURS
Status: COMPLETED | OUTPATIENT
Start: 2018-06-06 | End: 2018-06-07

## 2018-06-06 RX ORDER — METOPROLOL TARTRATE 100 MG/1
100 TABLET ORAL DAILY
Status: DISCONTINUED | OUTPATIENT
Start: 2018-06-06 | End: 2018-06-07 | Stop reason: HOSPADM

## 2018-06-06 RX ORDER — LOSARTAN POTASSIUM 50 MG/1
100 TABLET ORAL DAILY
Status: DISCONTINUED | OUTPATIENT
Start: 2018-06-06 | End: 2018-06-07 | Stop reason: HOSPADM

## 2018-06-06 RX ORDER — GLIPIZIDE 5 MG/1
10 TABLET ORAL
Status: DISCONTINUED | OUTPATIENT
Start: 2018-06-06 | End: 2018-06-07 | Stop reason: HOSPADM

## 2018-06-06 RX ORDER — AMOXICILLIN 250 MG
1 CAPSULE ORAL
Status: DISCONTINUED | OUTPATIENT
Start: 2018-06-06 | End: 2018-06-07 | Stop reason: HOSPADM

## 2018-06-06 RX ORDER — MORPHINE SULFATE 4 MG/ML
2 INJECTION, SOLUTION INTRAMUSCULAR; INTRAVENOUS
Status: DISCONTINUED | OUTPATIENT
Start: 2018-06-06 | End: 2018-06-07 | Stop reason: HOSPADM

## 2018-06-06 RX ORDER — ENEMA 19; 7 G/133ML; G/133ML
1 ENEMA RECTAL
Status: DISCONTINUED | OUTPATIENT
Start: 2018-06-06 | End: 2018-06-07 | Stop reason: HOSPADM

## 2018-06-06 RX ORDER — OXYCODONE HCL 5 MG/5 ML
SOLUTION, ORAL ORAL
Status: COMPLETED
Start: 2018-06-06 | End: 2018-06-06

## 2018-06-06 RX ORDER — DOCUSATE SODIUM 100 MG/1
100 CAPSULE, LIQUID FILLED ORAL 2 TIMES DAILY
Status: DISCONTINUED | OUTPATIENT
Start: 2018-06-06 | End: 2018-06-07 | Stop reason: HOSPADM

## 2018-06-06 RX ORDER — ACETAMINOPHEN 500 MG
1000 TABLET ORAL EVERY 6 HOURS
Status: DISCONTINUED | OUTPATIENT
Start: 2018-06-06 | End: 2018-06-07 | Stop reason: HOSPADM

## 2018-06-06 RX ORDER — LIDOCAINE HYDROCHLORIDE 10 MG/ML
0.5 INJECTION, SOLUTION INFILTRATION; PERINEURAL
Status: DISCONTINUED | OUTPATIENT
Start: 2018-06-06 | End: 2018-06-06

## 2018-06-06 RX ORDER — BISACODYL 10 MG
10 SUPPOSITORY, RECTAL RECTAL
Status: DISCONTINUED | OUTPATIENT
Start: 2018-06-06 | End: 2018-06-07 | Stop reason: HOSPADM

## 2018-06-06 RX ADMIN — OXYCODONE HYDROCHLORIDE 10 MG: 10 TABLET ORAL at 19:47

## 2018-06-06 RX ADMIN — ACETAMINOPHEN 1000 MG: 500 TABLET, FILM COATED ORAL at 17:26

## 2018-06-06 RX ADMIN — OXYCODONE HYDROCHLORIDE 10 MG: 10 TABLET ORAL at 15:33

## 2018-06-06 RX ADMIN — FENTANYL CITRATE 50 MCG: 50 INJECTION, SOLUTION INTRAMUSCULAR; INTRAVENOUS at 13:25

## 2018-06-06 RX ADMIN — OXYCODONE HYDROCHLORIDE 10 MG: 10 TABLET ORAL at 23:28

## 2018-06-06 RX ADMIN — AMITRIPTYLINE HYDROCHLORIDE 75 MG: 75 TABLET, FILM COATED ORAL at 22:14

## 2018-06-06 RX ADMIN — SENNOSIDES AND DOCUSATE SODIUM 1 TABLET: 8.6; 5 TABLET ORAL at 22:14

## 2018-06-06 RX ADMIN — ACETAMINOPHEN 1000 MG: 500 TABLET, FILM COATED ORAL at 23:28

## 2018-06-06 RX ADMIN — GLIPIZIDE 10 MG: 5 TABLET ORAL at 17:26

## 2018-06-06 RX ADMIN — FENTANYL CITRATE 50 MCG: 50 INJECTION, SOLUTION INTRAMUSCULAR; INTRAVENOUS at 13:45

## 2018-06-06 RX ADMIN — OXYCODONE HYDROCHLORIDE 10 MG: 5 SOLUTION ORAL at 13:20

## 2018-06-06 RX ADMIN — ACETAMINOPHEN 1000 MG: 500 TABLET, FILM COATED ORAL at 13:30

## 2018-06-06 RX ADMIN — MORPHINE SULFATE 2 MG: 4 INJECTION INTRAVENOUS at 19:37

## 2018-06-06 RX ADMIN — CEFAZOLIN SODIUM 2 G: 2 INJECTION, SOLUTION INTRAVENOUS at 17:26

## 2018-06-06 RX ADMIN — METOPROLOL TARTRATE 100 MG: 100 TABLET, FILM COATED ORAL at 22:15

## 2018-06-06 RX ADMIN — SODIUM CHLORIDE: 9 INJECTION, SOLUTION INTRAVENOUS at 10:08

## 2018-06-06 RX ADMIN — DOCUSATE SODIUM 100 MG: 100 CAPSULE ORAL at 22:13

## 2018-06-06 RX ADMIN — POTASSIUM CHLORIDE AND SODIUM CHLORIDE: 900; 150 INJECTION, SOLUTION INTRAVENOUS at 23:29

## 2018-06-06 ASSESSMENT — PAIN SCALES - GENERAL
PAINLEVEL_OUTOF10: 0
PAINLEVEL_OUTOF10: 0
PAINLEVEL_OUTOF10: 8
PAINLEVEL_OUTOF10: 6
PAINLEVEL_OUTOF10: 0
PAINLEVEL_OUTOF10: 6
PAINLEVEL_OUTOF10: 0
PAINLEVEL_OUTOF10: 6
PAINLEVEL_OUTOF10: 8
PAINLEVEL_OUTOF10: 6
PAINLEVEL_OUTOF10: 4
PAINLEVEL_OUTOF10: 8
PAINLEVEL_OUTOF10: 6
PAINLEVEL_OUTOF10: 6
PAINLEVEL_OUTOF10: 7
PAINLEVEL_OUTOF10: 0
PAINLEVEL_OUTOF10: 7

## 2018-06-06 NOTE — PROGRESS NOTES
NSX  DCS implantation earlier today  Some surgical site pain   No LE changes    PE:  Wound: C/D/I  LE motor 5/5 throughout    Plan:  1. Add 2 mg IV morphine q hour PRN  2. Routine post op orders  3. Try for home in am

## 2018-06-06 NOTE — PROGRESS NOTES
Patient has returned from surgery and is c/o severe pain, he has only oral pain med ordered at this time and is requesting IV pain medication and therfore RN contacted doctor office.

## 2018-06-06 NOTE — OP REPORT
1. DATE OF SURGERY: 6/6/2018    2. SURGEON:  Cammy Sam MD, PhD, FRACS, FACS, FAANS    3. ASSISTANT:  Tyrell Driver PA-C    4. TYPE OF ANESTHESIA:  General anesthesia with endotracheal intubation    5. 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      6. 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      7. HISTORY: See formal admission H and P    This is a 58-year-old man. He has a history of multiple back surgeries with an L3-4 and L5-S1 decompression fusion. He struggled with right leg pain. He has a chronic right L5 radiculopathy. Had a good response with spinal cord stimulator trial. Because of his failure of conservative therapy, and his response to the trial spinal cord stimulator, we offered a permanent implant. Please refer to H&P for the consent and details.     8. PREOPERATIVE PHYSICAL EXAMINATION: See formal admission H and P. He was neurologically intact. His back was tender. He had no focal neurological deficit.      9. TITLE OF PROCEDURE:  1. T10 laminotomy and insertion of a paddle electrode pulse generator  2. Insertion of a left lumbar paraspinal pulse generator.  3. Complex programming for 1 hour.  4. Segmental T10 fixation with Leibinger plate    10. OPERATIVE FINDINGS: Stimulator was well-placed. It straddled the T8/9 disc space. It was centrally and slightly to the right.    11. OPERATED LEVELS: T10    12. IMPLANTS USED:  [employer] paddle electrode and pulse generator    13. COMPLICATIONS:  Nil.    14. ESTIMATED BLOOD LOSS:       10 mL    15. OPERATIVE DETAILS:  After fully informed consent, the patient was brought to the operating room at [facility].  General anesthesia was administered.  The patient was given intravenous antibiotic.  The patient was carefully turned prone on  an OSI operating table with the hip/chest/thigh supports in place.  The posterior thoracic and the left paraspinal lumbar region was prepped and draped in a standard fashion.      A 3 cm incision was made after fluoroscopic localization in the vicinity where the trial electrode was placed, approximately at the T10 level.  A bilateral subperiosteal exposure was affected.  A repeat x-ray was obtained for confirmation of level.  Under magnification and illumination, using an AM-8 drill bit and 2 and 3 mm Kerrison punches, a laminotomy was affected from pedicle to pedicle.  This was at the base of the T9 lamina and the ligamentum flavum was removed with 2 mm Kerrison punches.  The paddle electrode was then passed into the epidural space.  AP and lateral x-ray was taken to confirm satisfactory placement, which matched the trial procedure for the electrode. This was central and slightly to the right of midline. He did have a little bit of curvature rotationally in the thoracic spine. The electrode was then sutured to the fascia in the interspinous region using the plastic connectors with 3-0 silk sutures.      A single Leibinger plate was curved and secured with 5 mm screws, which was placed over the leads into the lamina below effecting segmental fixation..  Hemostasis was obtained.      I then performed a 3 to 4 cm left paraspinal transverse incision using sharp dissection and bipolar cautery.  I then made a pocket.  I matched this pocket to the size of the template for the pulse generator.  Once the pocket was formed, I used the shunt passer to connect the 2 incisions going from the inferior incision to the top incision.  I put a slight curve in the shunt passer and made sure at all times it stayed just below the subcutaneous tissues.  This was brought out through the thoracic incision.  The electrode wires were then passed through the shunt passer and the shunt tunnel into the incision where the pulse generator would  be placed.      The electrodes were connected to the pulse generator.  Complex program affected.  The screws were tightened down to ensure the electrodes were not loose.  The redundant electrodes were then coiled and placed behind the pulse generator in the pocket.  The pulse generator was returned to the pocket.  Hemostasis was obtained.  Vancomycin powder was placed in both wounds.      The wounds were then closed using 2-0 Vicryl sutures for the fascia and for subcutaneous tissues and staples for the skin.  A dressing was applied.  The wounds were infiltrated with local anesthetic prior to closure. All counts were correct and all instruments accounted for.    16. PROGNOSIS:  The surgery went well.  At the end of the case, the patient was moving both lower extremities well.  We asked the representation from the company to test the stimulator transiently and good coverage of both lower extremities was obtained.  The stimulator was turned off.    Our plan will be to watch the patient carefully overnight to ensure that there is no bleeding or hematoma formation.  We will discharge him/her in the morning.    The plan will be to go home in the morning on narcotic analgesia, antibiotic and a muscle relaxant, not to shower for the first 72 hours, not to put anything on the skin incision and then return in the office for a follow-up visit in 2 weeks.  At that time, we will take the staples out and start the stimulator.        Cammy Sam MD    cc:    Severino Swenson

## 2018-06-06 NOTE — OR NURSING
1225: received to PACU via grney with oral airway. Respirations even and unlabored.  1230: oral airway dc'd without difficulty. Low back dressing and left lower back dressing CDI. Denies any pain. Denies any numbness or tingling sensation to upper or lower extremities. Able to move upper and lower extremities.  1325: c/o incisional pain. Medicated. See MAR,  1340: still c/o pain. Medicated, see MAR. Taking fluids well  1415: voided X1. Pain scale 6/10 and tolerable.  1440: report called to Sherry RN  1505: transported via gurney to [address] by transport with O2 at 1L/nc. Stable.

## 2018-06-06 NOTE — OR SURGEON
Immediate Post OP Note    PreOp Diagnosis: chronic pain  PostOp Diagnosis: chronic pain    Procedure(s):  LAMINOTOMY/T8 - Wound Class: Clean  SPINAL CORD STIMULATOR/ PADDLE ELECTRODE DORSAL COLOMN STIMULATOR AND PULSE GENERATOR - Wound Class: Clean    Surgeon(s):  Cammy Sam M.D.    Anesthesiologist/Type of Anesthesia:  Anesthesiologist: Royal Arguello M.D./General    Surgical Staff:  Circulator: Olga Jensen R.N.; Ingrid Sheehan R.N.  Relief Circulator: Royal Vazquez R.N.  Scrub Person: Kate Solano  First Assist: Tyrell Driver P.A.-C.  Radiology Technologist: Caroline Avila    Specimens removed if any:  Assistants: Tyrell Driver PA-C  ,  Specimen: nil    Estimated Blood Loss: 10 cc    Findings: n/a    Complications: nil        6/6/2018 12:28 PM Cammy Sam M.D.

## 2018-06-07 VITALS
HEART RATE: 78 BPM | WEIGHT: 218.92 LBS | BODY MASS INDEX: 28.1 KG/M2 | DIASTOLIC BLOOD PRESSURE: 84 MMHG | SYSTOLIC BLOOD PRESSURE: 136 MMHG | OXYGEN SATURATION: 98 % | TEMPERATURE: 97.5 F | HEIGHT: 74 IN | RESPIRATION RATE: 18 BRPM

## 2018-06-07 LAB
ANION GAP SERPL CALC-SCNC: 10 MMOL/L (ref 0–11.9)
BUN SERPL-MCNC: 20 MG/DL (ref 8–22)
CALCIUM SERPL-MCNC: 8.4 MG/DL (ref 8.5–10.5)
CHLORIDE SERPL-SCNC: 97 MMOL/L (ref 96–112)
CO2 SERPL-SCNC: 25 MMOL/L (ref 20–33)
CREAT SERPL-MCNC: 1.06 MG/DL (ref 0.5–1.4)
ERYTHROCYTE [DISTWIDTH] IN BLOOD BY AUTOMATED COUNT: 38.5 FL (ref 35.9–50)
GLUCOSE SERPL-MCNC: 395 MG/DL (ref 65–99)
HCT VFR BLD AUTO: 35.6 % (ref 42–52)
HGB BLD-MCNC: 12.4 G/DL (ref 14–18)
MCH RBC QN AUTO: 29.8 PG (ref 27–33)
MCHC RBC AUTO-ENTMCNC: 34.8 G/DL (ref 33.7–35.3)
MCV RBC AUTO: 85.6 FL (ref 81.4–97.8)
PLATELET # BLD AUTO: 138 K/UL (ref 164–446)
PMV BLD AUTO: 10.9 FL (ref 9–12.9)
POTASSIUM SERPL-SCNC: 4.2 MMOL/L (ref 3.6–5.5)
RBC # BLD AUTO: 4.16 M/UL (ref 4.7–6.1)
SODIUM SERPL-SCNC: 132 MMOL/L (ref 135–145)
WBC # BLD AUTO: 17 K/UL (ref 4.8–10.8)

## 2018-06-07 PROCEDURE — G8979 MOBILITY GOAL STATUS: HCPCS | Mod: CI

## 2018-06-07 PROCEDURE — 97161 PT EVAL LOW COMPLEX 20 MIN: CPT

## 2018-06-07 PROCEDURE — G8980 MOBILITY D/C STATUS: HCPCS | Mod: CI

## 2018-06-07 PROCEDURE — 700111 HCHG RX REV CODE 636 W/ 250 OVERRIDE (IP): Performed by: PHYSICIAN ASSISTANT

## 2018-06-07 PROCEDURE — A9270 NON-COVERED ITEM OR SERVICE: HCPCS | Performed by: PHYSICIAN ASSISTANT

## 2018-06-07 PROCEDURE — G8978 MOBILITY CURRENT STATUS: HCPCS | Mod: CI

## 2018-06-07 PROCEDURE — 97165 OT EVAL LOW COMPLEX 30 MIN: CPT

## 2018-06-07 PROCEDURE — 36415 COLL VENOUS BLD VENIPUNCTURE: CPT

## 2018-06-07 PROCEDURE — 80048 BASIC METABOLIC PNL TOTAL CA: CPT

## 2018-06-07 PROCEDURE — G0378 HOSPITAL OBSERVATION PER HR: HCPCS

## 2018-06-07 PROCEDURE — 85027 COMPLETE CBC AUTOMATED: CPT

## 2018-06-07 PROCEDURE — 700102 HCHG RX REV CODE 250 W/ 637 OVERRIDE(OP): Performed by: PHYSICIAN ASSISTANT

## 2018-06-07 PROCEDURE — G8989 SELF CARE D/C STATUS: HCPCS | Mod: CI

## 2018-06-07 PROCEDURE — G8987 SELF CARE CURRENT STATUS: HCPCS | Mod: CI

## 2018-06-07 PROCEDURE — 700112 HCHG RX REV CODE 229: Performed by: PHYSICIAN ASSISTANT

## 2018-06-07 PROCEDURE — G8988 SELF CARE GOAL STATUS: HCPCS | Mod: CI

## 2018-06-07 RX ORDER — CEPHALEXIN 500 MG/1
500 CAPSULE ORAL 4 TIMES DAILY
Qty: 20 CAP | Refills: 0 | Status: SHIPPED | OUTPATIENT
Start: 2018-06-07 | End: 2019-06-05

## 2018-06-07 RX ORDER — OXYCODONE HYDROCHLORIDE AND ACETAMINOPHEN 5; 325 MG/1; MG/1
1-2 TABLET ORAL EVERY 6 HOURS PRN
Qty: 60 TAB | Refills: 0 | Status: SHIPPED | OUTPATIENT
Start: 2018-06-07 | End: 2018-06-15

## 2018-06-07 RX ORDER — METHOCARBAMOL 750 MG/1
750 TABLET, FILM COATED ORAL 3 TIMES DAILY
Qty: 90 TAB | Refills: 0 | Status: SHIPPED | OUTPATIENT
Start: 2018-06-07 | End: 2019-06-05

## 2018-06-07 RX ADMIN — OXYCODONE HYDROCHLORIDE 10 MG: 10 TABLET ORAL at 08:28

## 2018-06-07 RX ADMIN — LOSARTAN POTASSIUM 100 MG: 50 TABLET ORAL at 08:26

## 2018-06-07 RX ADMIN — OXYCODONE HYDROCHLORIDE 10 MG: 10 TABLET ORAL at 03:29

## 2018-06-07 RX ADMIN — LORATADINE 10 MG: 10 TABLET ORAL at 08:27

## 2018-06-07 RX ADMIN — GLIPIZIDE 10 MG: 5 TABLET ORAL at 06:23

## 2018-06-07 RX ADMIN — CHLORTHALIDONE 25 MG: 25 TABLET ORAL at 08:27

## 2018-06-07 RX ADMIN — PRAVASTATIN SODIUM 40 MG: 20 TABLET ORAL at 08:27

## 2018-06-07 RX ADMIN — METFORMIN HYDROCHLORIDE 500 MG: 500 TABLET, FILM COATED ORAL at 06:23

## 2018-06-07 RX ADMIN — PIOGLITAZONE 15 MG: 15 TABLET ORAL at 08:27

## 2018-06-07 RX ADMIN — DOCUSATE SODIUM 100 MG: 100 CAPSULE ORAL at 08:28

## 2018-06-07 RX ADMIN — ACETAMINOPHEN 1000 MG: 500 TABLET, FILM COATED ORAL at 06:23

## 2018-06-07 RX ADMIN — CEFAZOLIN SODIUM 2 G: 2 INJECTION, SOLUTION INTRAVENOUS at 03:29

## 2018-06-07 ASSESSMENT — COGNITIVE AND FUNCTIONAL STATUS - GENERAL
DRESSING REGULAR LOWER BODY CLOTHING: A LITTLE
CLIMB 3 TO 5 STEPS WITH RAILING: A LITTLE
TURNING FROM BACK TO SIDE WHILE IN FLAT BAD: A LITTLE
MOVING TO AND FROM BED TO CHAIR: A LITTLE
DRESSING REGULAR UPPER BODY CLOTHING: A LITTLE
MOVING FROM LYING ON BACK TO SITTING ON SIDE OF FLAT BED: A LITTLE
TOILETING: A LITTLE
MOBILITY SCORE: 20
HELP NEEDED FOR BATHING: A LITTLE
SUGGESTED CMS G CODE MODIFIER DAILY ACTIVITY: CJ
DAILY ACTIVITIY SCORE: 20
SUGGESTED CMS G CODE MODIFIER MOBILITY: CJ

## 2018-06-07 ASSESSMENT — PAIN SCALES - GENERAL
PAINLEVEL_OUTOF10: 7
PAINLEVEL_OUTOF10: 0

## 2018-06-07 ASSESSMENT — ACTIVITIES OF DAILY LIVING (ADL): TOILETING: INDEPENDENT

## 2018-06-07 ASSESSMENT — GAIT ASSESSMENTS
DISTANCE (FEET): 200
DEVIATION: DECREASED HEEL STRIKE
GAIT LEVEL OF ASSIST: MODIFIED INDEPENDENT

## 2018-06-07 NOTE — THERAPY
"Physical Therapy Evaluation completed.   Bed Mobility:  Supine to Sit: Supervised  Transfers: Sit to Stand: Modified Independent  Gait: Level Of Assist: Modified Independent with No Equipment Needed       Plan of Care: Patient with no further skilled PT needs in the acute care setting at this time  Discharge Recommendations: Equipment: No Equipment Needed. Post-acute therapy Discharge to home with outpatient for additional skilled therapy services when appropriate post-operatively.    See \"Rehab Therapy-Acute\" Patient Summary Report for complete documentation.     "

## 2018-06-07 NOTE — DISCHARGE SUMMARY
DATE OF ADMISSION:  06/06/2018    DATE OF DISCHARGE:  06/07/2018    DISCHARGE DIAGNOSIS:  Status post implantation of dorsal column stimulator   with pulse generator.    REASON FOR ADMISSION:  The patient is a pleasant 58-year-old male well known   to our office.  He had a previous L3-L4 and L5-S1 decompressive laminectomy   and fusion.  He had ongoing right leg pain, had EMG study, which showed   chronic right L5 radiculopathy.  He had good response with spinal cord   stimulator trial and was offered the above-mentioned surgery, which did   consent for.    HOSPITAL COURSE:  The patient underwent the above operation without any   complications.  Stimulator was turned on in the recovery room and was covering   his low back and right leg appropriately.  He was admitted to the   neurosurgery floor.  On postoperative day #1, he was eating, drinking,   mobilizing and voiding.  His pain was controlled with oral analgesia.  At that   time, it was determined that he met criteria for discharge and he was   discharged home in stable condition.    DISCHARGE INSTRUCTIONS:  He is to be extremely cautious with bending, flexing,   twisting about the low back.  He is to contact our office with any questions   or concerns.  He is to avoid NSAIDs for at least 2 weeks.  He will start   Plavix 17 days postoperatively.  He may shower in a couple of days, but he is   to avoid submerging the wound until further instructed.    DISCHARGE MEDICATIONS:  1.  Percocet 5/325, #60.  2.  Keflex 500 mg #20.  3.  Robaxin 750 mg, #90.    I once again have answered his questions to best of my ability.  He is now   again discharged to home in stable condition.  We will follow up with him as   an outpatient.  Again, he will start his Plavix when his staples are out 2   weeks after surgery.       ____________________________________     MARJAN Fagan / ROSEMARY    DD:  06/07/2018 08:24:12  DT:  06/07/2018 15:56:56    D#:  [id number]  Job#:   [id number]    cc: MENDOZA MANDLE DO

## 2018-06-07 NOTE — PROGRESS NOTES
Discharge instructions and prescriptions provided and reviewed w/pt. All questions answered. Pt left w/hospital escort via wheel chair. All belongings taken.

## 2018-06-07 NOTE — PROGRESS NOTES
Received bedside report and assumed care, pt a&ox4, teds and scds in place, iv fluids infusing. Top dressing with shadowing, bottom dressing c/d/I, bed low/locked with side rails up, call light and personal belongings in reach, reminded to call for assistance and of purposeful rounding.

## 2018-06-07 NOTE — THERAPY
"Occupational Therapy Evaluation completed.   Functional Status:  SPV level for BADLs in this setting  Plan of Care: Patient with no further skilled OT needs in the acute care setting at this time  Discharge Recommendations:  Equipment: No Equipment Needed. Post-acute therapy: Refer to PT notes for DC recommendation. Pt. Safe to DC home    See \"Rehab Therapy-Acute\" Patient Summary Report for complete documentation.      Pt is a 59 y/o male who presents to acute 2' T8 laminotomy, and spinal cord stimulator insertion. Pt. Lives w/ spouse and adult son in a one story home, will have 24/7 SPV for the first few days. Ed/trained pt on to perform BADLs w/ decreased back/spinal pain. No further acute OT services noted at this time.   "

## 2018-06-07 NOTE — DISCHARGE INSTRUCTIONS
Discharge Instructions    Discharged to home by car with relative. Discharged via wheelchair, hospital escort: Yes.  Special equipment needed: Not Applicable    Be sure to schedule a follow-up appointment with your primary care doctor or any specialists as instructed.     Discharge Plan:   Diet Plan: (P) Discussed  Activity Level: (P) Discussed  Confirmed Follow up Appointment: (P) Patient to Call and Schedule Appointment  Confirmed Symptoms Management: (P) Discussed  Medication Reconciliation Updated: (P) Yes  Influenza Vaccine Indication: Patient Refuses    I understand that a diet low in cholesterol, fat, and sodium is recommended for good health. Unless I have been given specific instructions below for another diet, I accept this instruction as my diet prescription.   Other diet: regular     Special Instructions: None    · Is patient discharged on Warfarin / Coumadin?   No         Depression / Suicide Risk    As you are discharged from this [facility] facility, it is important to learn how to keep safe from harming yourself.    Recognize the warning signs:  · Abrupt changes in personality, positive or negative- including increase in energy   · Giving away possessions  · Change in eating patterns- significant weight changes-  positive or negative  · Change in sleeping patterns- unable to sleep or sleeping all the time   · Unwillingness or inability to communicate  · Depression  · Unusual sadness, discouragement and loneliness  · Talk of wanting to die  · Neglect of personal appearance   · Rebelliousness- reckless behavior  · Withdrawal from people/activities they love  · Confusion- inability to concentrate     If you or a loved one observes any of these behaviors or has concerns about self-harm, here's what you can do:  · Talk about it- your feelings and reasons for harming yourself  · Remove any means that you might use to hurt yourself (examples: pills, rope, extension cords, firearm)  · Get professional  help from the community (Mental Health, Substance Abuse, psychological counseling)  · Do not be alone:Call your Safe Contact- someone whom you trust who will be there for you.  · Call your local CRISIS HOTLINE 561-0688 or 771-208-3250  · Call your local Children's Mobile Crisis Response Team [place] (172) 246-2903 or www.Inform Direct  · Call the toll free National Suicide Prevention Hotlines   · National Suicide Prevention Lifeline 266-544-YVUR (0295)  · National Hope Line Network 800-SUICIDE (416-3094)

## 2018-06-07 NOTE — PROGRESS NOTES
Neurosurgery Progress Note    Subjective:  Seen with Dr. Sam  Pain controlled w/ orals  Mobilizing  Voiding  Eating and Drinking   No N/V    Exam:  Wound C/D/I x2  LE motor intact  Sensation intact  VSS-afebrile  WBC 17.0, labs otherwise stable    BP  Min: 120/76  Max: 158/96  Pulse  Av.2  Min: 68  Max: 99  Resp  Av.9  Min: 10  Max: 19  Temp  Av.3 °C (97.4 °F)  Min: 35.9 °C (96.7 °F)  Max: 36.7 °C (98.1 °F)  SpO2  Av.1 %  Min: 93 %  Max: 100 %    No Data Recorded    Recent Labs      18   0206   WBC  17.0*   RBC  4.16*   HEMOGLOBIN  12.4*   HEMATOCRIT  35.6*   MCV  85.6   MCH  29.8   MCHC  34.8   RDW  38.5   PLATELETCT  138*   MPV  10.9     Recent Labs      18   0206   SODIUM  132*   POTASSIUM  4.2   CHLORIDE  97   CO2  25   GLUCOSE  395*   BUN  20   CREATININE  1.06   CALCIUM  8.4*               Intake/Output       18 0700 - 18 0659 18 07 - 18 0659       8800-7438 Total  8294-4619 Total       Intake    P.O.  400  -- 400  --  -- --    P.O. 400 -- 400 -- -- --    I.V.  1200  -- 1200  --  -- --    Crystalloid Intake 1200 -- 1200 -- -- --    Total Intake 1600 -- 1600 -- -- --       Output    Urine  250  750 1000  --  -- --    Number of Times Voided 1 x -- 1 x -- -- --    Urine Void (mL) (non-catheter)  -- -- --    Total Output  -- -- --       Net I/O     1350 -750 600 -- -- --            Intake/Output Summary (Last 24 hours) at 18 0813  Last data filed at 18 0330   Gross per 24 hour   Intake             1600 ml   Output             1000 ml   Net              600 ml            • amitriptyline  75 mg Nightly   • chlorthalidone  25 mg DAILY   • glipiZIDE  10 mg BID AC   • loratadine  10 mg DAILY   • losartan  100 mg DAILY   • metformin  500 mg QDAY with Breakfast   • metoprolol  100 mg DAILY   • pioglitazone  15 mg DAILY   • pravastatin  40 mg DAILY   • Pharmacy Consult Request  1 Each PRN   • MD ALERT...Do  not administer NSAIDS or ASPIRIN unless ORDERED By Neurosurgery  1 Each PRN   • docusate sodium  100 mg BID   • senna-docusate  1 Tab Nightly   • senna-docusate  1 Tab Q24HRS PRN   • polyethylene glycol/lytes  1 Packet BID PRN   • magnesium hydroxide  30 mL QDAY PRN   • bisacodyl  10 mg Q24HRS PRN   • fleet  1 Each Once PRN   • 0.9 % NaCl with KCl 20 mEq 1,000 mL   Continuous   • acetaminophen  1,000 mg Q6HRS   • oxyCODONE immediate-release  5-10 mg Q4HRS PRN   • morphine injection  2 mg Q HOUR PRN       Assessment and Plan:  Hospital day #2  POD #1  Prophylactic anticoagulation: no    Plan:  1. Incentive spirometry  2. Mobilize  3. D/C home today at 1100hrs

## 2018-06-25 DIAGNOSIS — E78.2 MIXED HYPERLIPIDEMIA: ICD-10-CM

## 2018-06-26 ENCOUNTER — HOSPITAL ENCOUNTER (OUTPATIENT)
Dept: LAB | Facility: MEDICAL CENTER | Age: 58
End: 2018-06-26
Attending: SPECIALIST
Payer: COMMERCIAL

## 2018-06-26 LAB
ALBUMIN SERPL BCP-MCNC: 4.4 G/DL (ref 3.2–4.9)
ALBUMIN/GLOB SERPL: 1.3 G/DL
ALP SERPL-CCNC: 84 U/L (ref 30–99)
ALT SERPL-CCNC: 21 U/L (ref 2–50)
ANION GAP SERPL CALC-SCNC: 11 MMOL/L (ref 0–11.9)
AST SERPL-CCNC: 21 U/L (ref 12–45)
BASOPHILS # BLD AUTO: 0.9 % (ref 0–1.8)
BASOPHILS # BLD: 0.13 K/UL (ref 0–0.12)
BILIRUB SERPL-MCNC: 0.5 MG/DL (ref 0.1–1.5)
BUN SERPL-MCNC: 17 MG/DL (ref 8–22)
CALCIUM SERPL-MCNC: 9.8 MG/DL (ref 8.5–10.5)
CHLORIDE SERPL-SCNC: 97 MMOL/L (ref 96–112)
CO2 SERPL-SCNC: 29 MMOL/L (ref 20–33)
CREAT SERPL-MCNC: 1.11 MG/DL (ref 0.5–1.4)
EOSINOPHIL # BLD AUTO: 0 K/UL (ref 0–0.51)
EOSINOPHIL NFR BLD: 0 % (ref 0–6.9)
ERYTHROCYTE [DISTWIDTH] IN BLOOD BY AUTOMATED COUNT: 42.9 FL (ref 35.9–50)
GLOBULIN SER CALC-MCNC: 3.3 G/DL (ref 1.9–3.5)
GLUCOSE SERPL-MCNC: 195 MG/DL (ref 65–99)
HCT VFR BLD AUTO: 40.5 % (ref 42–52)
HGB BLD-MCNC: 13 G/DL (ref 14–18)
LDH SERPL L TO P-CCNC: 164 U/L (ref 107–266)
LYMPHOCYTES # BLD AUTO: 10.87 K/UL (ref 1–4.8)
LYMPHOCYTES NFR BLD: 76 % (ref 22–41)
MANUAL DIFF BLD: NORMAL
MCH RBC QN AUTO: 29.2 PG (ref 27–33)
MCHC RBC AUTO-ENTMCNC: 32.1 G/DL (ref 33.7–35.3)
MCV RBC AUTO: 91 FL (ref 81.4–97.8)
MONOCYTES # BLD AUTO: 0.14 K/UL (ref 0–0.85)
MONOCYTES NFR BLD AUTO: 1 % (ref 0–13.4)
MORPHOLOGY BLD-IMP: NORMAL
NEUTROPHILS # BLD AUTO: 3.16 K/UL (ref 1.82–7.42)
NEUTROPHILS NFR BLD: 22.1 % (ref 44–72)
NRBC # BLD AUTO: 0 K/UL
NRBC BLD-RTO: 0 /100 WBC
OVALOCYTES BLD QL SMEAR: NORMAL
PLATELET # BLD AUTO: 201 K/UL (ref 164–446)
PLATELET BLD QL SMEAR: NORMAL
PMV BLD AUTO: 10.9 FL (ref 9–12.9)
POIKILOCYTOSIS BLD QL SMEAR: NORMAL
POTASSIUM SERPL-SCNC: 3.8 MMOL/L (ref 3.6–5.5)
PROT SERPL-MCNC: 7.7 G/DL (ref 6–8.2)
RBC # BLD AUTO: 4.45 M/UL (ref 4.7–6.1)
RBC BLD AUTO: PRESENT
SMUDGE CELLS BLD QL SMEAR: NORMAL
SODIUM SERPL-SCNC: 137 MMOL/L (ref 135–145)
WBC # BLD AUTO: 14.3 K/UL (ref 4.8–10.8)

## 2018-06-26 PROCEDURE — 83615 LACTATE (LD) (LDH) ENZYME: CPT

## 2018-06-26 PROCEDURE — 80053 COMPREHEN METABOLIC PANEL: CPT

## 2018-06-26 PROCEDURE — 36415 COLL VENOUS BLD VENIPUNCTURE: CPT

## 2018-06-26 PROCEDURE — 85007 BL SMEAR W/DIFF WBC COUNT: CPT

## 2018-06-26 PROCEDURE — 85027 COMPLETE CBC AUTOMATED: CPT

## 2018-06-26 RX ORDER — PRAVASTATIN SODIUM 40 MG
40 TABLET ORAL DAILY
Qty: 90 TAB | Refills: 3 | Status: SHIPPED | OUTPATIENT
Start: 2018-06-26 | End: 2019-08-18 | Stop reason: SDUPTHER

## 2018-08-10 ENCOUNTER — TELEPHONE (OUTPATIENT)
Dept: MEDICAL GROUP | Facility: LAB | Age: 58
End: 2018-08-10

## 2018-08-10 ENCOUNTER — PATIENT MESSAGE (OUTPATIENT)
Dept: MEDICAL GROUP | Facility: CLINIC | Age: 58
End: 2018-08-10

## 2018-08-10 DIAGNOSIS — R10.13 DYSPEPSIA: ICD-10-CM

## 2018-08-23 RX ORDER — CLOPIDOGREL BISULFATE 75 MG/1
TABLET ORAL
Qty: 90 TAB | Refills: 3 | Status: SHIPPED | OUTPATIENT
Start: 2018-08-23 | End: 2019-08-18 | Stop reason: SDUPTHER

## 2018-08-23 NOTE — TELEPHONE ENCOUNTER
Was the patient seen in the last year in this department? Yes lov 5/24/18    Does patient have an active prescription for medications requested? No     Received Request Via: Pharmacy

## 2018-11-22 DIAGNOSIS — I10 ESSENTIAL HYPERTENSION: ICD-10-CM

## 2018-11-26 ENCOUNTER — TELEPHONE (OUTPATIENT)
Dept: MEDICAL GROUP | Facility: LAB | Age: 58
End: 2018-11-26

## 2018-11-26 DIAGNOSIS — N52.9 ERECTILE DYSFUNCTION, UNSPECIFIED ERECTILE DYSFUNCTION TYPE: ICD-10-CM

## 2018-11-26 RX ORDER — SILDENAFIL 100 MG/1
100 TABLET, FILM COATED ORAL PRN
Qty: 10 TAB | Refills: 3 | Status: SHIPPED | OUTPATIENT
Start: 2018-11-26 | End: 2019-01-09

## 2018-11-26 RX ORDER — METOPROLOL TARTRATE 100 MG/1
TABLET ORAL
Qty: 90 TAB | Refills: 3 | Status: SHIPPED | OUTPATIENT
Start: 2018-11-26 | End: 2018-11-30

## 2018-11-30 ENCOUNTER — HOSPITAL ENCOUNTER (OUTPATIENT)
Dept: LAB | Facility: MEDICAL CENTER | Age: 58
End: 2018-11-30
Attending: INTERNAL MEDICINE
Payer: COMMERCIAL

## 2018-11-30 ENCOUNTER — OFFICE VISIT (OUTPATIENT)
Dept: MEDICAL GROUP | Facility: LAB | Age: 58
End: 2018-11-30
Payer: COMMERCIAL

## 2018-11-30 VITALS
TEMPERATURE: 97.6 F | HEART RATE: 65 BPM | WEIGHT: 229 LBS | HEIGHT: 74 IN | OXYGEN SATURATION: 98 % | RESPIRATION RATE: 12 BRPM | BODY MASS INDEX: 29.39 KG/M2 | DIASTOLIC BLOOD PRESSURE: 88 MMHG | SYSTOLIC BLOOD PRESSURE: 145 MMHG

## 2018-11-30 DIAGNOSIS — I10 ESSENTIAL HYPERTENSION: ICD-10-CM

## 2018-11-30 DIAGNOSIS — Z23 NEED FOR IMMUNIZATION AGAINST INFLUENZA: ICD-10-CM

## 2018-11-30 DIAGNOSIS — I63.30 CEREBROVASCULAR ACCIDENT (CVA) DUE TO THROMBOSIS OF CEREBRAL ARTERY (HCC): ICD-10-CM

## 2018-11-30 DIAGNOSIS — E11.9 TYPE 2 DIABETES MELLITUS WITHOUT COMPLICATION (HCC): ICD-10-CM

## 2018-11-30 DIAGNOSIS — E11.9 TYPE 2 DIABETES MELLITUS WITHOUT COMPLICATION, WITHOUT LONG-TERM CURRENT USE OF INSULIN (HCC): ICD-10-CM

## 2018-11-30 LAB
EST. AVERAGE GLUCOSE BLD GHB EST-MCNC: 220 MG/DL
HBA1C MFR BLD: 9.3 % (ref 0–5.6)

## 2018-11-30 PROCEDURE — 90471 IMMUNIZATION ADMIN: CPT | Performed by: INTERNAL MEDICINE

## 2018-11-30 PROCEDURE — 99214 OFFICE O/P EST MOD 30 MIN: CPT | Mod: 25 | Performed by: INTERNAL MEDICINE

## 2018-11-30 PROCEDURE — 36415 COLL VENOUS BLD VENIPUNCTURE: CPT

## 2018-11-30 PROCEDURE — 90686 IIV4 VACC NO PRSV 0.5 ML IM: CPT | Performed by: INTERNAL MEDICINE

## 2018-11-30 PROCEDURE — 83036 HEMOGLOBIN GLYCOSYLATED A1C: CPT

## 2018-11-30 RX ORDER — AMLODIPINE BESYLATE 5 MG/1
5 TABLET ORAL DAILY
Qty: 30 TAB | Refills: 6 | Status: SHIPPED | OUTPATIENT
Start: 2018-11-30 | End: 2019-07-15

## 2018-11-30 ASSESSMENT — PATIENT HEALTH QUESTIONNAIRE - PHQ9: CLINICAL INTERPRETATION OF PHQ2 SCORE: 0

## 2018-11-30 NOTE — PROGRESS NOTES
CC: Gil Hart is a 58 y.o. male is suffering from   Chief Complaint   Patient presents with   • Follow-Up     6 months         SUBJECTIVE:  1. Essential hypertension  Gil is here for follow-up, has a history of hypertension, we have discussed that metoprolol potentially is causing significant sexual side effects.  Discussed will gradually go ahead and titrate off Metroprolol 2 other blood pressure medications.  Patient has no history of cardiac arrhythmia.    2. Cerebrovascular accident (CVA) due to thrombosis of cerebral artery (HCC)  Patient does have a history of a previous stroke, critical that we keep his blood pressure under control.    3. Need for immunization against influenza  Vaccination given    4. Type 2 diabetes mellitus without complication, without long-term current use of insulin (HCC)  History of type 2 diabetes will adjust medication as necessary to achieve optimal control        Past social, family, history: , Lanie  Social History   Substance Use Topics   • Smoking status: Never Smoker   • Smokeless tobacco: Never Used   • Alcohol use No      Comment: Past history of alcoholism--last drink was 2010--attends Recovery Meetings at his [employer]         MEDICATIONS:    Current Outpatient Prescriptions:   •  metformin (GLUCOPHAGE) 1000 MG tablet, TAKE 1 TABLET BY MOUTH TWICE DAILY WITH MEALS, Disp: 180 Tab, Rfl: 3  •  metoprolol (LOPRESSOR) 25 MG Tab, Take 1 Tab by mouth 2 times a day., Disp: 60 Tab, Rfl: 1  •  amLODIPine (NORVASC) 5 MG Tab, Take 1 Tab by mouth every day., Disp: 30 Tab, Rfl: 6  •  glipiZIDE (GLUCOTROL) 10 MG Tab, TAKE 1 TAB BY MOUTH 2 TIMES A DAY., Disp: 180 Tab, Rfl: 2  •  losartan (COZAAR) 100 MG Tab, TAKE 1 TABLET BY MOUTH EVERY DAY, Disp: 90 Tab, Rfl: 3  •  clopidogrel (PLAVIX) 75 MG Tab, TAKE ONE TABLET BY MOUTH DAILY, Disp: 90 Tab, Rfl: 3  •  pravastatin (PRAVACHOL) 40 MG tablet, TAKE 1 TAB BY MOUTH EVERY DAY., Disp: 90 Tab, Rfl: 3  •  amitriptyline (ELAVIL)  75 MG Tab, Take 75 mg by mouth every evening., Disp: , Rfl:   •  pioglitazone (ACTOS) 15 MG Tab, Take 1 Tab by mouth every day., Disp: 30 Tab, Rfl: 11  •  chlorthalidone (HYGROTON) 25 MG Tab, Take 1 Tab by mouth every day., Disp: 90 Tab, Rfl: 3  •  Cholecalciferol (VITAMIN D3) 2000 UNIT Cap, Take 1 Cap by mouth every day., Disp: , Rfl:   •  Melatonin 10 MG Tab, Take 1 Tab by mouth every bedtime., Disp: , Rfl:   •  loratadine (CLARITIN) 10 MG Tab, Take 10 mg by mouth every day., Disp: , Rfl:   •  Cyanocobalamin (VITAMIN B-12 PO), Take 1 Tab by mouth every day., Disp: , Rfl:   •  sildenafil citrate (VIAGRA) 100 MG tablet, Take 1 Tab by mouth as needed for Erectile Dysfunction., Disp: 10 Tab, Rfl: 3  •  methocarbamol (ROBAXIN) 750 MG Tab, Take 1 Tab by mouth 3 times a day., Disp: 90 Tab, Rfl: 0  •  cephALEXin (KEFLEX) 500 MG Cap, Take 1 Cap by mouth 4 times a day., Disp: 20 Cap, Rfl: 0  •  methocarbamol (ROBAXIN) 750 MG Tab, Take 1 Tab by mouth 3 times a day., Disp: 90 Tab, Rfl: 0  •  glipiZIDE (GLUCOTROL) 10 MG Tab, Take 10 mg by mouth 2 times a day., Disp: , Rfl:   •  acetaminophen (TYLENOL) 500 MG Tab, Take 1,000 mg by mouth 1 time daily as needed., Disp: , Rfl:     PROBLEMS:  Patient Active Problem List    Diagnosis Date Noted   • Stroke (HCC) 04/12/2016     Priority: High   • Hyponatremia 05/29/2017     Priority: Medium   • Chronic pancreatitis (HCC) 05/28/2017     Priority: Medium   • Dilated cbd, acquired 05/28/2017     Priority: Medium   • Type 2 diabetes mellitus without complication (HCC) 02/05/2016     Priority: Medium   • CLL (chronic lymphocytic leukemia) (HCC) 11/09/2015     Priority: Medium   • Hypertension 12/03/2013     Priority: Medium   • History of Amanda-en-Y gastric bypass 12/03/2013     Priority: Medium   • Normocytic normochromic anemia 05/29/2017     Priority: Low   • Mixed hyperlipidemia 12/03/2013     Priority: Low   • Chronic use of opiate drugs therapeutic purposes 08/19/2017   •  "NSAID-induced duodenal ulcer 06/26/2017   • Medical marijuana use 06/26/2017   • Obesity (BMI 30-39.9) 09/02/2016   • Family history of cerebral aneurysm 02/09/2016   • Low back pain radiating to right leg 12/03/2013   • Vitamin D deficiency disease 12/03/2013   • Personal history of alcoholism (HCC) 12/03/2013   • History of chronic pancreatitis 12/03/2013       REVIEW OF SYSTEMS:  Gen.:  No Nausea, Vomiting, fever, Chills.  Heart: No chest pain.  Lungs:  No shortness of Breath.  Psychological: Víctor unusual Anxiety depression     PHYSICAL EXAM   Constitutional: Alert, cooperative, not in acute distress.  Cardiovascular:  Rate Rhythm is regular without murmurs rubs clicks.     Thorax & Lungs: Clear to auscultation, no wheezing, rhonchi, or rales  HENT: Normocephalic, Atraumatic.  Eyes: PERRLA, EOMI, Conjunctiva normal.   Neck: Trachia is midline no swelling of the thyroid.   Lymphatic: No lymphadenopathy noted.   Abdomin: Soft non-tender, no rebound, no guarding.   Skin: Warm, Dry, No erythema, No rash.   Extremities: Atraumatic with symmetric distal pulses, No edema, No tenderness, No cyanosis, No clubbing.   Neurologic: Alert & oriented x 3, cranial nerves II through XII are intact, Normal motor function, Normal sensory function, No focal deficits noted.   Psychiatric: Affect normal, Judgment normal, Mood normal.     VITAL SIGNS:/88   Pulse 65   Temp 36.4 °C (97.6 °F) (Temporal)   Resp 12   Ht 1.88 m (6' 2\")   Wt 103.9 kg (229 lb)   SpO2 98%   BMI 29.40 kg/m²     Labs: Reviewed    Assessment:                                                     Plan:    1. Essential hypertension  Decrease metoprolol to 25 mg twice daily add amlodipine 5 mg  - metoprolol (LOPRESSOR) 25 MG Tab; Take 1 Tab by mouth 2 times a day.  Dispense: 60 Tab; Refill: 1  - amLODIPine (NORVASC) 5 MG Tab; Take 1 Tab by mouth every day.  Dispense: 30 Tab; Refill: 6    2. Cerebrovascular accident (CVA) due to thrombosis of cerebral " artery ([facility])  Continue to work towards normalization of blood pressure  - amLODIPine (NORVASC) 5 MG Tab; Take 1 Tab by mouth every day.  Dispense: 30 Tab; Refill: 6    3. Need for immunization against influenza  Vaccination given  - Influenza Vaccine Quad Injection >3Y (PF)    4. Type 2 diabetes mellitus without complication, without long-term current use of insulin ([facility])  Recheck hemoglobin A1c 6 weeks  - HEMOGLOBIN A1C; Future

## 2018-12-24 ENCOUNTER — HOSPITAL ENCOUNTER (OUTPATIENT)
Dept: LAB | Facility: MEDICAL CENTER | Age: 58
End: 2018-12-24
Attending: INTERNAL MEDICINE
Payer: COMMERCIAL

## 2018-12-24 ENCOUNTER — HOSPITAL ENCOUNTER (OUTPATIENT)
Dept: LAB | Facility: MEDICAL CENTER | Age: 58
End: 2018-12-24
Attending: SPECIALIST
Payer: COMMERCIAL

## 2018-12-24 LAB
ALBUMIN SERPL BCP-MCNC: 4.2 G/DL (ref 3.2–4.9)
ALBUMIN/GLOB SERPL: 1.6 G/DL
ALP SERPL-CCNC: 93 U/L (ref 30–99)
ALT SERPL-CCNC: 18 U/L (ref 2–50)
ANION GAP SERPL CALC-SCNC: 11 MMOL/L (ref 0–11.9)
AST SERPL-CCNC: 18 U/L (ref 12–45)
BASOPHILS # BLD AUTO: 0 % (ref 0–1.8)
BASOPHILS # BLD: 0 K/UL (ref 0–0.12)
BILIRUB SERPL-MCNC: 0.6 MG/DL (ref 0.1–1.5)
BUN SERPL-MCNC: 14 MG/DL (ref 8–22)
CALCIUM SERPL-MCNC: 9.4 MG/DL (ref 8.5–10.5)
CHLORIDE SERPL-SCNC: 98 MMOL/L (ref 96–112)
CO2 SERPL-SCNC: 29 MMOL/L (ref 20–33)
CREAT SERPL-MCNC: 1.06 MG/DL (ref 0.5–1.4)
EOSINOPHIL # BLD AUTO: 0 K/UL (ref 0–0.51)
EOSINOPHIL NFR BLD: 0 % (ref 0–6.9)
ERYTHROCYTE [DISTWIDTH] IN BLOOD BY AUTOMATED COUNT: 41.1 FL (ref 35.9–50)
FASTING STATUS PATIENT QL REPORTED: NORMAL
GLOBULIN SER CALC-MCNC: 2.7 G/DL (ref 1.9–3.5)
GLUCOSE SERPL-MCNC: 284 MG/DL (ref 65–99)
HCT VFR BLD AUTO: 40.9 % (ref 42–52)
HGB BLD-MCNC: 13.5 G/DL (ref 14–18)
LYMPHOCYTES # BLD AUTO: 9.96 K/UL (ref 1–4.8)
LYMPHOCYTES NFR BLD: 73.2 % (ref 22–41)
MANUAL DIFF BLD: NORMAL
MCH RBC QN AUTO: 28.9 PG (ref 27–33)
MCHC RBC AUTO-ENTMCNC: 33 G/DL (ref 33.7–35.3)
MCV RBC AUTO: 87.6 FL (ref 81.4–97.8)
MONOCYTES # BLD AUTO: 0.12 K/UL (ref 0–0.85)
MONOCYTES NFR BLD AUTO: 0.9 % (ref 0–13.4)
MORPHOLOGY BLD-IMP: NORMAL
NEUTROPHILS # BLD AUTO: 3.52 K/UL (ref 1.82–7.42)
NEUTROPHILS NFR BLD: 25.9 % (ref 44–72)
NRBC # BLD AUTO: 0.02 K/UL
NRBC BLD-RTO: 0.1 /100 WBC
PLATELET # BLD AUTO: 157 K/UL (ref 164–446)
PLATELET BLD QL SMEAR: NORMAL
PMV BLD AUTO: 12 FL (ref 9–12.9)
POIKILOCYTOSIS BLD QL SMEAR: NORMAL
POTASSIUM SERPL-SCNC: 3.8 MMOL/L (ref 3.6–5.5)
PROT SERPL-MCNC: 6.9 G/DL (ref 6–8.2)
RBC # BLD AUTO: 4.67 M/UL (ref 4.7–6.1)
RBC BLD AUTO: PRESENT
SMUDGE CELLS BLD QL SMEAR: NORMAL
SODIUM SERPL-SCNC: 138 MMOL/L (ref 135–145)
WBC # BLD AUTO: 13.6 K/UL (ref 4.8–10.8)

## 2018-12-24 PROCEDURE — 85027 COMPLETE CBC AUTOMATED: CPT

## 2018-12-24 PROCEDURE — 85007 BL SMEAR W/DIFF WBC COUNT: CPT

## 2018-12-24 PROCEDURE — 36415 COLL VENOUS BLD VENIPUNCTURE: CPT

## 2018-12-24 PROCEDURE — 80053 COMPREHEN METABOLIC PANEL: CPT

## 2019-01-09 ENCOUNTER — TELEPHONE (OUTPATIENT)
Dept: MEDICAL GROUP | Facility: LAB | Age: 59
End: 2019-01-09

## 2019-01-09 DIAGNOSIS — N52.9 ERECTILE DYSFUNCTION, UNSPECIFIED ERECTILE DYSFUNCTION TYPE: ICD-10-CM

## 2019-01-09 RX ORDER — SILDENAFIL 100 MG/1
100 TABLET, FILM COATED ORAL PRN
Qty: 10 TAB | Refills: 3 | Status: SHIPPED | OUTPATIENT
Start: 2019-01-09 | End: 2019-03-07

## 2019-03-03 DIAGNOSIS — I10 ESSENTIAL HYPERTENSION: ICD-10-CM

## 2019-03-04 RX ORDER — CHLORTHALIDONE 25 MG/1
25 TABLET ORAL DAILY
Qty: 90 TAB | Refills: 3 | Status: SHIPPED | OUTPATIENT
Start: 2019-03-04 | End: 2020-03-06

## 2019-03-04 NOTE — TELEPHONE ENCOUNTER
Was the patient seen in the last year in this department? Yes LOV 11/30/18    Does patient have an active prescription for medications requested? No     Received Request Via: Pharmacy

## 2019-03-05 ENCOUNTER — PATIENT MESSAGE (OUTPATIENT)
Dept: MEDICAL GROUP | Facility: LAB | Age: 59
End: 2019-03-05

## 2019-03-05 DIAGNOSIS — N52.9 ERECTILE DYSFUNCTION, UNSPECIFIED ERECTILE DYSFUNCTION TYPE: ICD-10-CM

## 2019-03-07 RX ORDER — TADALAFIL 10 MG/1
10 TABLET ORAL PRN
Qty: 10 TAB | Refills: 3 | Status: SHIPPED | OUTPATIENT
Start: 2019-03-07 | End: 2019-05-01 | Stop reason: SDUPTHER

## 2019-03-21 ENCOUNTER — PATIENT MESSAGE (OUTPATIENT)
Dept: MEDICAL GROUP | Facility: LAB | Age: 59
End: 2019-03-21

## 2019-04-19 NOTE — IP AVS SNAPSHOT
[facility] Access Code: Activation code not generated  Current [facility] Status: Active    [employer]  A secure, online tool to manage your health information     [employer]’s [facility]® is a secure, online tool that connects you to your personalized health information from the privacy of your home -- day or night - making it very easy for you to manage your healthcare. Once the activation process is completed, you can even access your medical information using the [facility] gibson, which is available for free in the [employer] store or Google Play store.     [employer] [facility] provides the following levels of access (as shown below):   My Chart Features   [facility] Primary Care Doctor [facility]  Specialists [facility]  Urgent  Care Non-[facility]  Primary Care  Doctor   Email your healthcare team securely and privately 24/7 X X X X   Manage appointments: schedule your next appointment; view details of past/upcoming appointments X      Request prescription refills. X      View recent personal medical records, including lab and immunizations X X X X   View health record, including health history, allergies, medications X X X X   Read reports about your outpatient visits, procedures, consult and ER notes X X X X   See your discharge summary, which is a recap of your hospital and/or ER visit that includes your diagnosis, lab results, and care plan. X X       How to register for [employer] [facility]:  1. Go to  [email or web address].  2. Click on the Sign Up Now box, which takes you to the New Member Sign Up page. You will need to provide the following information:  a. Enter your [employer] [employer] [facility] [employer] [facility] Access Code exactly as it appears at the top of this page. (You will not need to use this code after you’ve completed the sign-up process. If you do not sign up before the expiration date, you must request a new code.)   b. Enter your date of birth.   c. Enter your home email address.   d. Click Submit, and follow the next screen’s instructions.  3. Create a [employer] [facility] ID. This will  [[email or web address]] : patient here for dizziness/ breaking out in sweats/ chills/ swollen left side of face and Left ear pain\par \par 67 yo female c 1-2 day hx of dizziness, +L ear pain, +L sided facial pressure.  no fever +chills/sweats no sore throat, no dysphagia\par +dry cough no N/V +loose stools x 1 day (no bloody/black stools) no rashes \par \par no known sick contact  be your [employer] login ID and cannot be changed, so think of one that is secure and easy to remember.  4. Create a [employer] password. You can change your password at any time.  5. Enter your Password Reset Question and Answer. This can be used at a later time if you forget your password.   6. Enter your e-mail address. This allows you to receive e-mail notifications when new information is available in [employer].  7. Click Sign Up. You can now view your health information.    For assistance activating your [employer] account, call [phone]

## 2019-05-01 DIAGNOSIS — N52.9 ERECTILE DYSFUNCTION, UNSPECIFIED ERECTILE DYSFUNCTION TYPE: ICD-10-CM

## 2019-05-01 RX ORDER — TADALAFIL 10 MG/1
10 TABLET ORAL PRN
Qty: 10 TAB | Refills: 3 | Status: SHIPPED | OUTPATIENT
Start: 2019-05-01 | End: 2019-12-03 | Stop reason: SDUPTHER

## 2019-05-01 NOTE — TELEPHONE ENCOUNTER
----- Message from Gil Hart sent at 5/1/2019  4:18 PM PDT -----  Regarding: Prescription Question  Contact: [phone]  Pratik CORTES, since my insurance will not cover ED meds, and [facility] wants to charge $1million for it, will you please send the prescription to [facility] pharmacy in [place] where I know it will be cheaper.    Thanks,  Robert

## 2019-05-06 ENCOUNTER — TELEPHONE (OUTPATIENT)
Dept: MEDICAL GROUP | Facility: LAB | Age: 59
End: 2019-05-06

## 2019-05-06 DIAGNOSIS — E08.00 DIABETES MELLITUS DUE TO UNDERLYING CONDITION WITH HYPEROSMOLARITY WITHOUT COMA, UNSPECIFIED WHETHER LONG TERM INSULIN USE (HCC): ICD-10-CM

## 2019-05-06 NOTE — TELEPHONE ENCOUNTER
----- Message from Gil Hart sent at 5/3/2019  9:32 PM PDT -----  Regarding: Procedure Question  Contact: [phone]  Dr Swenson, can you give me a referral to     [facility] for Endocrinology for my Diabeites check.    ThanksRobert

## 2019-05-19 DIAGNOSIS — E11.9 TYPE 2 DIABETES MELLITUS WITHOUT COMPLICATION, WITHOUT LONG-TERM CURRENT USE OF INSULIN (HCC): ICD-10-CM

## 2019-05-20 RX ORDER — PIOGLITAZONEHYDROCHLORIDE 15 MG/1
TABLET ORAL
Qty: 90 TAB | Refills: 2 | Status: SHIPPED | OUTPATIENT
Start: 2019-05-20 | End: 2019-07-19 | Stop reason: SDUPTHER

## 2019-05-20 NOTE — TELEPHONE ENCOUNTER
Was the patient seen in the last year in this department? Yes 11-    Does patient have an active prescription for medications requested? No     Received Request Via: Pharmacy

## 2019-06-05 ENCOUNTER — OFFICE VISIT (OUTPATIENT)
Dept: ENDOCRINOLOGY | Facility: MEDICAL CENTER | Age: 59
End: 2019-06-05
Payer: COMMERCIAL

## 2019-06-05 VITALS
HEART RATE: 88 BPM | SYSTOLIC BLOOD PRESSURE: 110 MMHG | WEIGHT: 225 LBS | BODY MASS INDEX: 28.88 KG/M2 | HEIGHT: 74 IN | DIASTOLIC BLOOD PRESSURE: 76 MMHG | OXYGEN SATURATION: 96 %

## 2019-06-05 DIAGNOSIS — I10 ESSENTIAL HYPERTENSION: ICD-10-CM

## 2019-06-05 DIAGNOSIS — E11.9 TYPE 2 DIABETES MELLITUS WITHOUT COMPLICATION, UNSPECIFIED WHETHER LONG TERM INSULIN USE (HCC): ICD-10-CM

## 2019-06-05 LAB
HBA1C MFR BLD: 9.6 % (ref 0–5.6)
INT CON NEG: NEGATIVE
INT CON POS: POSITIVE

## 2019-06-05 PROCEDURE — 99204 OFFICE O/P NEW MOD 45 MIN: CPT | Performed by: PHYSICIAN ASSISTANT

## 2019-06-05 PROCEDURE — 83036 HEMOGLOBIN GLYCOSYLATED A1C: CPT | Performed by: PHYSICIAN ASSISTANT

## 2019-06-05 NOTE — PROGRESS NOTES
New Patient Consult Note  Referred by: Altaf Swenson D.O.    Reason for consult: Diabetes Management Type 2    HPI:  Gil Hart is a 59 y.o. old patient who is seeing us today for diabetes care.  This is a pleasant patient with diabetes and I appreciate the opportunity to participate in the care of this patient.  This is a new patient with me today.    Labs of 6/5/2019 HbA1c is 9.6, GFR >60  Labs of 11/30/18 HbA1c is 9.3  Labs of 2/9/17 HbA1c is 8.3    BG Diary:6/5/2019  In the AM:  No log    Has been Diabetic since T2  1997  Has a Glucagon pen at home: no    1. Type 2 diabetes mellitus without complication, unspecified whether long term insulin use (HCC)  This is a new patient with me on 6/5/2019  They are on:  1.  Actos 15mg   2   Metformin 1000mg one in the AM one in the PM  3.  Glipizide 10mg    Stop:  2   Metformin 1000mg one in the AM one in the PM  3.  Glipizide 10mg    Start:  1.  Synjardy 12.5/1000 one in the AM one in the PM  2.  Ozempic 0.25 once a week for 3 weeks then INCREASE to 0.5 for three weeks then increase to 1.0. Ozempic can cause nausea and upset stomach feelings but this generally only lasts a day or two.  If this persists longer than 2 weeks and is not tolerable please discontinue the medication and let us know of the issue.  3.  Actos 15mg one a day      2. Essential hypertension  This is stable today and no new changes are needed or required in today's visit      ROS:   Constitutional: No change in weight , No fatigue, No night sweats.  HEENT: No Headache.  Eyes:  No blurred vision, No visual changes.  Cardiac: No chest pain, No palpitations.  Resp: No shortness of breath, No cough,   Gastro: No nausea or vomiting, No diarrhea.  Neuro: Denies numbness or tinging in bilateral feet or hands, and no loss of sensation.  Endo: No heat or cold intolerance.  : No polyuria, No polydipsia, No chronic UTI's.  Lower extremities: No lower leg edema bilateral.  All other systems were  reviewed and were negative.    Past Medical History:  Patient Active Problem List    Diagnosis Date Noted   • Stroke ([facility]) 04/12/2016     Priority: High   • Hyponatremia 05/29/2017     Priority: Medium   • Chronic pancreatitis ([facility]) 05/28/2017     Priority: Medium   • Dilated cbd, acquired 05/28/2017     Priority: Medium   • Type 2 diabetes mellitus without complication ([facility]) 02/05/2016     Priority: Medium   • CLL (chronic lymphocytic leukemia) ([facility]) 11/09/2015     Priority: Medium   • Hypertension 12/03/2013     Priority: Medium   • History of Amanda-en-Y gastric bypass 12/03/2013     Priority: Medium   • Normocytic normochromic anemia 05/29/2017     Priority: Low   • Mixed hyperlipidemia 12/03/2013     Priority: Low   • Chronic use of opiate drugs therapeutic purposes 08/19/2017   • NSAID-induced duodenal ulcer 06/26/2017   • Medical marijuana use 06/26/2017   • Obesity (BMI 30-39.9) 09/02/2016   • Family history of cerebral aneurysm 02/09/2016   • Low back pain radiating to right leg 12/03/2013   • Vitamin D deficiency disease 12/03/2013   • Personal history of alcoholism ([facility]) 12/03/2013   • History of chronic pancreatitis 12/03/2013       Past Surgical History:  Past Surgical History:   Procedure Laterality Date   • LAMINOTOMY  6/6/2018    Procedure: LAMINOTOMY/T8;  Surgeon: Cammy Sam M.D.;  Location: [facility];  Service: Neurosurgery   • SPINAL CORD STIMULATOR  6/6/2018    Procedure: SPINAL CORD STIMULATOR/ PADDLE ELECTRODE DORSAL COLOMN STIMULATOR AND PULSE GENERATOR;  Surgeon: Cammy Sam M.D.;  Location: [facility];  Service: Neurosurgery   • GASTROSCOPY-ENDO N/A 5/30/2017    Procedure: GASTROSCOPY-ENDO;  Surgeon: Noe Paniagua M.D.;  Location: [facility];  Service:    • LUMBAR FUSION POSTERIOR  2012    L3-S1   • LAMINOTOMY  2010    X Stop   • BONE SPUR EXCISION  2008    right ankle bone spur removal   • HAND SURGERY  2006    removal of ganglion cyst  left hand   • GASTRIC BYPASS LAPAROSCOPIC  2001    [facility]   • CELIAC PLEXUS NEUROLYSIS      intermittent when necessary chronic pancreatic pains       Allergies:  Pcn [penicillins]    Social History:  Social History     Social History   • Marital status:      Spouse name: N/A   • Number of children: N/A   • Years of education: N/A     Occupational History   • Not on file.     Social History Main Topics   • Smoking status: Never Smoker   • Smokeless tobacco: Never Used   • Alcohol use No      Comment: Past history of alcoholism--last drink was 2010--attends Recovery Meetings at his [employer]   • Drug use: No   • Sexual activity: Yes     Partners: Female     Birth control/ protection: Post-Menopausal     Other Topics Concern   • Not on file     Social History Narrative   • No narrative on file       Family History:  Family History   Problem Relation Age of Onset   • Alcohol/Drug Mother    • Alcohol/Drug Father    • Stroke Father    • Stroke Sister    • GI Sister         cirrhosis   • Heart Disease Sister        Medications:    Current Outpatient Prescriptions:   •  pioglitazone (ACTOS) 15 MG Tab, TAKE 1 TABLET BY MOUTH EVERY DAY, Disp: 90 Tab, Rfl: 2  •  tadalafil (CIALIS) 10 MG tablet, Take 1 Tab by mouth as needed for Erectile Dysfunction., Disp: 10 Tab, Rfl: 3  •  chlorthalidone (HYGROTON) 25 MG Tab, TAKE 1 TAB BY MOUTH EVERY DAY., Disp: 90 Tab, Rfl: 3  •  metoprolol (LOPRESSOR) 25 MG Tab, Take 1 Tab by mouth 2 times a day., Disp: 60 Tab, Rfl: 1  •  amLODIPine (NORVASC) 5 MG Tab, Take 1 Tab by mouth every day., Disp: 30 Tab, Rfl: 6  •  losartan (COZAAR) 100 MG Tab, TAKE 1 TABLET BY MOUTH EVERY DAY, Disp: 90 Tab, Rfl: 3  •  clopidogrel (PLAVIX) 75 MG Tab, TAKE ONE TABLET BY MOUTH DAILY, Disp: 90 Tab, Rfl: 3  •  pravastatin (PRAVACHOL) 40 MG tablet, TAKE 1 TAB BY MOUTH EVERY DAY., Disp: 90 Tab, Rfl: 3  •  Cholecalciferol (VITAMIN D3) 2000 UNIT Cap, Take 1 Cap by mouth every day., Disp: , Rfl:   •  loratadine  "(CLARITIN) 10 MG Tab, Take 10 mg by mouth every day., Disp: , Rfl:   •  Cyanocobalamin (VITAMIN B-12 PO), Take 1 Tab by mouth every day., Disp: , Rfl:   •  acetaminophen (TYLENOL) 500 MG Tab, Take 1,000 mg by mouth 1 time daily as needed., Disp: , Rfl:       Physical Examination:   Vital signs: /76 (BP Location: Left arm, Patient Position: Sitting)   Pulse 88   Ht 1.88 m (6' 2\")   Wt 102.1 kg (225 lb)   SpO2 96%   BMI 28.89 kg/m²   General: No distress, cooperative, well dressed and well nourished.   Eyes: No scleral icterus or discharge, No hyposphagma  ENMT: Normal on external inspection of nose, lips, No nasal drainage   Neck: No abnormal masses on inspection  Resp: Normal effort, Bilateral clear to auscultation, No wheezing, No rales  CVS: Regular rate and rhythm, S1 S2 normal, No murmur. No gallop  Extremities: No edema bilateral extremities  Neuro: Alert and oriented  Skin: No rash, No Ulcers  Psych: Normal mood and affect  Foot exam: normal sensation to monofilament testing, normal pulses, no ulcers.  Normal Vibration quantitative sensation test.    Assessment and Plan:    1. Type 2 diabetes mellitus without complication, unspecified whether long term insulin use (HCC)    Stop:  2   Metformin 1000mg one in the AM one in the PM  3.  Glipizide 10mg    Start:  1.  Synjardy 12.5/1000 one in the AM one in the PM  2.  Ozempic 0.25 once a week for 3 weeks then INCREASE to 0.5 for three weeks then increase to 1.0. Ozempic can cause nausea and upset stomach feelings but this generally only lasts a day or two.  If this persists longer than 2 weeks and is not tolerable please discontinue the medication and let us know of the issue.  3.  Actos 15mg one a day    2. Essential hypertension  This is stable today and no new changes are needed or required in today's visit    Return in about 6 weeks (around 7/17/2019).      This patient during there office visit was started on new medication .  Side effects of new " medications were discussed with the patient today in the office. The patient was supplied paperwork on this new medication.    Thank you kindly for allowing me to participate in the diabetes care plan for this patient.    Ruslan Zamora PA-C, BC-[facility]  Board Certified - Advanced Diabetes Management  06/05/19    CC:   Altaf Swenson D.O.

## 2019-06-05 NOTE — PATIENT INSTRUCTIONS
Start:  1.  Synjardy 12.5/1000 one in the AM one in the PM  2.  Ozempic 0.25 once a week for 3 weeks then INCREASE to 0.5 for three weeks then increase to 1.0. Ozempic can cause nausea and upset stomach feelings but this generally only lasts a day or two.  If this persists longer than 2 weeks and is not tolerable please discontinue the medication and let us know of the issue.  3.  Actos 15mg one a day    Blood glucose log: Check BG in the morning when wake up,  and before bed.  So two times a day.  Always bring BG diary to the next office visit.

## 2019-06-18 RX ORDER — ONDANSETRON 4 MG/1
4 TABLET, FILM COATED ORAL EVERY 4 HOURS PRN
Qty: 20 TAB | Refills: 1 | Status: SHIPPED | OUTPATIENT
Start: 2019-06-18 | End: 2022-02-10 | Stop reason: SDUPTHER

## 2019-07-15 ENCOUNTER — OFFICE VISIT (OUTPATIENT)
Dept: MEDICAL GROUP | Facility: LAB | Age: 59
End: 2019-07-15
Payer: COMMERCIAL

## 2019-07-15 VITALS
HEIGHT: 74 IN | TEMPERATURE: 98.4 F | SYSTOLIC BLOOD PRESSURE: 122 MMHG | HEART RATE: 98 BPM | OXYGEN SATURATION: 99 % | DIASTOLIC BLOOD PRESSURE: 85 MMHG | BODY MASS INDEX: 26.05 KG/M2 | RESPIRATION RATE: 12 BRPM | WEIGHT: 203 LBS

## 2019-07-15 DIAGNOSIS — M54.2 NECK PAIN: ICD-10-CM

## 2019-07-15 DIAGNOSIS — I63.30 CEREBROVASCULAR ACCIDENT (CVA) DUE TO THROMBOSIS OF CEREBRAL ARTERY (HCC): ICD-10-CM

## 2019-07-15 DIAGNOSIS — E11.9 TYPE 2 DIABETES MELLITUS WITHOUT COMPLICATION, UNSPECIFIED WHETHER LONG TERM INSULIN USE (HCC): ICD-10-CM

## 2019-07-15 DIAGNOSIS — R63.4 WEIGHT LOSS: ICD-10-CM

## 2019-07-15 PROCEDURE — 99214 OFFICE O/P EST MOD 30 MIN: CPT | Performed by: INTERNAL MEDICINE

## 2019-07-15 RX ORDER — METOPROLOL TARTRATE 100 MG/1
TABLET ORAL
COMMUNITY
End: 2019-08-30

## 2019-07-15 RX ORDER — MULTIVITAMIN
TABLET ORAL
COMMUNITY

## 2019-07-15 RX ORDER — GLIPIZIDE 10 MG/1
TABLET ORAL
COMMUNITY
End: 2019-08-30

## 2019-07-15 ASSESSMENT — PATIENT HEALTH QUESTIONNAIRE - PHQ9: CLINICAL INTERPRETATION OF PHQ2 SCORE: 0

## 2019-07-18 ENCOUNTER — HOSPITAL ENCOUNTER (OUTPATIENT)
Dept: LAB | Facility: MEDICAL CENTER | Age: 59
End: 2019-07-18
Attending: INTERNAL MEDICINE
Payer: COMMERCIAL

## 2019-07-18 DIAGNOSIS — I63.30 CEREBROVASCULAR ACCIDENT (CVA) DUE TO THROMBOSIS OF CEREBRAL ARTERY (HCC): ICD-10-CM

## 2019-07-18 DIAGNOSIS — E11.9 TYPE 2 DIABETES MELLITUS WITHOUT COMPLICATION, UNSPECIFIED WHETHER LONG TERM INSULIN USE (HCC): ICD-10-CM

## 2019-07-18 DIAGNOSIS — R63.4 WEIGHT LOSS: ICD-10-CM

## 2019-07-18 LAB
ALBUMIN SERPL BCP-MCNC: 4.1 G/DL (ref 3.2–4.9)
ALBUMIN/GLOB SERPL: 1.6 G/DL
ALP SERPL-CCNC: 78 U/L (ref 30–99)
ALT SERPL-CCNC: 14 U/L (ref 2–50)
ANION GAP SERPL CALC-SCNC: 9 MMOL/L (ref 0–11.9)
ANISOCYTOSIS BLD QL SMEAR: ABNORMAL
AST SERPL-CCNC: 17 U/L (ref 12–45)
BASOPHILS # BLD AUTO: 0 % (ref 0–1.8)
BASOPHILS # BLD: 0 K/UL (ref 0–0.12)
BILIRUB SERPL-MCNC: 0.5 MG/DL (ref 0.1–1.5)
BUN SERPL-MCNC: 15 MG/DL (ref 8–22)
CALCIUM SERPL-MCNC: 9.2 MG/DL (ref 8.5–10.5)
CHLORIDE SERPL-SCNC: 99 MMOL/L (ref 96–112)
CHOLEST SERPL-MCNC: 129 MG/DL (ref 100–199)
CO2 SERPL-SCNC: 29 MMOL/L (ref 20–33)
CREAT SERPL-MCNC: 1.37 MG/DL (ref 0.5–1.4)
EOSINOPHIL # BLD AUTO: 0 K/UL (ref 0–0.51)
EOSINOPHIL NFR BLD: 0 % (ref 0–6.9)
ERYTHROCYTE [DISTWIDTH] IN BLOOD BY AUTOMATED COUNT: 45.9 FL (ref 35.9–50)
EST. AVERAGE GLUCOSE BLD GHB EST-MCNC: 220 MG/DL
FASTING STATUS PATIENT QL REPORTED: NORMAL
GLOBULIN SER CALC-MCNC: 2.6 G/DL (ref 1.9–3.5)
GLUCOSE SERPL-MCNC: 235 MG/DL (ref 65–99)
HBA1C MFR BLD: 9.3 % (ref 0–5.6)
HCT VFR BLD AUTO: 40.7 % (ref 42–52)
HDLC SERPL-MCNC: 36 MG/DL
HGB BLD-MCNC: 12.6 G/DL (ref 14–18)
LDLC SERPL CALC-MCNC: 66 MG/DL
LYMPHOCYTES # BLD AUTO: 8.69 K/UL (ref 1–4.8)
LYMPHOCYTES NFR BLD: 69 % (ref 22–41)
MACROCYTES BLD QL SMEAR: ABNORMAL
MANUAL DIFF BLD: NORMAL
MCH RBC QN AUTO: 27.9 PG (ref 27–33)
MCHC RBC AUTO-ENTMCNC: 31 G/DL (ref 33.7–35.3)
MCV RBC AUTO: 90.2 FL (ref 81.4–97.8)
MONOCYTES # BLD AUTO: 0 K/UL (ref 0–0.85)
MONOCYTES NFR BLD AUTO: 0 % (ref 0–13.4)
MORPHOLOGY BLD-IMP: NORMAL
NEUTROPHILS # BLD AUTO: 3.91 K/UL (ref 1.82–7.42)
NEUTROPHILS NFR BLD: 31 % (ref 44–72)
NRBC # BLD AUTO: 0 K/UL
NRBC BLD-RTO: 0 /100 WBC
PLATELET # BLD AUTO: 148 K/UL (ref 164–446)
PLATELET BLD QL SMEAR: NORMAL
PMV BLD AUTO: 11.6 FL (ref 9–12.9)
POTASSIUM SERPL-SCNC: 3.9 MMOL/L (ref 3.6–5.5)
PROT SERPL-MCNC: 6.7 G/DL (ref 6–8.2)
RBC # BLD AUTO: 4.51 M/UL (ref 4.7–6.1)
RBC BLD AUTO: PRESENT
SMUDGE CELLS BLD QL SMEAR: NORMAL
SODIUM SERPL-SCNC: 137 MMOL/L (ref 135–145)
T4 FREE SERPL-MCNC: 1.27 NG/DL (ref 0.53–1.43)
TRIGL SERPL-MCNC: 133 MG/DL (ref 0–149)
TSH SERPL DL<=0.005 MIU/L-ACNC: 2.21 UIU/ML (ref 0.38–5.33)
WBC # BLD AUTO: 12.6 K/UL (ref 4.8–10.8)

## 2019-07-18 PROCEDURE — 84439 ASSAY OF FREE THYROXINE: CPT

## 2019-07-18 PROCEDURE — 80061 LIPID PANEL: CPT

## 2019-07-18 PROCEDURE — 80053 COMPREHEN METABOLIC PANEL: CPT

## 2019-07-18 PROCEDURE — 84443 ASSAY THYROID STIM HORMONE: CPT

## 2019-07-18 PROCEDURE — 83036 HEMOGLOBIN GLYCOSYLATED A1C: CPT

## 2019-07-18 PROCEDURE — 85007 BL SMEAR W/DIFF WBC COUNT: CPT

## 2019-07-18 PROCEDURE — 36415 COLL VENOUS BLD VENIPUNCTURE: CPT

## 2019-07-18 PROCEDURE — 85027 COMPLETE CBC AUTOMATED: CPT

## 2019-07-19 ENCOUNTER — OFFICE VISIT (OUTPATIENT)
Dept: ENDOCRINOLOGY | Facility: MEDICAL CENTER | Age: 59
End: 2019-07-19
Payer: COMMERCIAL

## 2019-07-19 VITALS
DIASTOLIC BLOOD PRESSURE: 72 MMHG | OXYGEN SATURATION: 97 % | HEIGHT: 74 IN | BODY MASS INDEX: 26.56 KG/M2 | HEART RATE: 104 BPM | SYSTOLIC BLOOD PRESSURE: 118 MMHG | WEIGHT: 207 LBS

## 2019-07-19 DIAGNOSIS — I10 ESSENTIAL HYPERTENSION: ICD-10-CM

## 2019-07-19 DIAGNOSIS — E11.9 TYPE 2 DIABETES MELLITUS WITHOUT COMPLICATION, UNSPECIFIED WHETHER LONG TERM INSULIN USE (HCC): ICD-10-CM

## 2019-07-19 DIAGNOSIS — E11.9 TYPE 2 DIABETES MELLITUS WITHOUT COMPLICATION, WITHOUT LONG-TERM CURRENT USE OF INSULIN (HCC): ICD-10-CM

## 2019-07-19 PROCEDURE — 99214 OFFICE O/P EST MOD 30 MIN: CPT | Performed by: PHYSICIAN ASSISTANT

## 2019-07-19 RX ORDER — PIOGLITAZONEHYDROCHLORIDE 15 MG/1
15 TABLET ORAL
Qty: 90 TAB | Refills: 2 | Status: SHIPPED | OUTPATIENT
Start: 2019-07-19 | End: 2019-08-30 | Stop reason: SDUPTHER

## 2019-07-19 NOTE — PROGRESS NOTES
Return to office Patient Consult Note  Referred by: Altaf Swenson D.O.    Reason for consult: Diabetes Management Type 2    HPI:  Gil Hart is a 59 y.o. old patient who is seeing us today for diabetes care.  This is a pleasant patient with diabetes and I appreciate the opportunity to participate in the care of this patient.    Labs of 6/5/2019 HbA1c is 9.6, GFR >60  Labs of 11/30/18 HbA1c is 9.3  Labs of 2/9/17 HbA1c is 8.3    BG Diary:7/19/2019  In the AM:  No log      1. Type 2 diabetes mellitus without complication, unspecified whether long term insulin use (HCC)  This is a new patient with me on 6/5/2019  They were on:  1.  Actos 15mg   2   Metformin 1000mg one in the AM one in the PM  3.  Glipizide 10mg     Stop:  2   Metformin 1000mg one in the AM one in the PM  3.  Glipizide 10mg     Now on:  1.  Synjardy 12.5/1000 one in the AM one in the PM  2.  Ozempic 0.5 once a week  3.  Actos 15mg one a day        2. Essential hypertension  This is stable today and no new changes are needed or required in today's visit      ROS:   Constitutional: No night sweats.  Eyes:  No visual changes.  Cardiac: No chest pain, No palpitations or racing heart rate.  Resp: No shortness of breath, No cough,   Gi: No Diarrhea    All other systems were reviewed and were/are negative.      Past Medical History:  Patient Active Problem List    Diagnosis Date Noted   • Stroke ([facility]) 04/12/2016     Priority: High   • Hyponatremia 05/29/2017     Priority: Medium   • Chronic pancreatitis (HCC) 05/28/2017     Priority: Medium   • Dilated cbd, acquired 05/28/2017     Priority: Medium   • Type 2 diabetes mellitus without complication ([facility]) 02/05/2016     Priority: Medium   • CLL (chronic lymphocytic leukemia) ([facility]) 11/09/2015     Priority: Medium   • Hypertension 12/03/2013     Priority: Medium   • History of Amanda-en-Y gastric bypass 12/03/2013     Priority: Medium   • Normocytic normochromic anemia 05/29/2017     Priority: Low    • Mixed hyperlipidemia 12/03/2013     Priority: Low   • Chronic use of opiate drugs therapeutic purposes 08/19/2017   • NSAID-induced duodenal ulcer 06/26/2017   • Medical marijuana use 06/26/2017   • Obesity (BMI 30-39.9) 09/02/2016   • Family history of cerebral aneurysm 02/09/2016   • Low back pain radiating to right leg 12/03/2013   • Vitamin D deficiency disease 12/03/2013   • Personal history of alcoholism (HCC) 12/03/2013   • History of chronic pancreatitis 12/03/2013       Past Surgical History:  Past Surgical History:   Procedure Laterality Date   • LAMINOTOMY  6/6/2018    Procedure: LAMINOTOMY/T8;  Surgeon: Cammy Sam M.D.;  Location: SUR[facility]ERY [facility];  Service: Neurosurgery   • SPINAL CORD STIMULATOR  6/6/2018    Procedure: SPINAL CORD STIMULATOR/ PADDLE ELECTRODE DORSAL COLOMN STIMULATOR AND PULSE GENERATOR;  Surgeon: Cammy Sam M.D.;  Location: SURGERY [facility];  Service: Neurosurgery   • GASTROSCOPY-ENDO N/A 5/30/2017    Procedure: GASTROSCOPY-ENDO;  Surgeon: Noe Paniagua M.D.;  Location: SURGERY [facility];  Service:    • LUMBAR FUSION POSTERIOR  2012    L3-S1   • LAMINOTOMY  2010    X Stop   • BONE SPUR EXCISION  2008    right ankle bone spur removal   • HAND SURGERY  2006    removal of ganglion cyst left hand   • GASTRIC BYPASS LAPAROSCOPIC  2001    [facility]   • CELIAC PLEXUS NEUROLYSIS      intermittent when necessary chronic pancreatic pains       Allergies:  Pcn [penicillins]    Social History:  Social History     Social History   • Marital status:      Spouse name: N/A   • Number of children: N/A   • Years of education: N/A     Occupational History   • Not on file.     Social History Main Topics   • Smoking status: Never Smoker   • Smokeless tobacco: Never Used   • Alcohol use No      Comment: Past history of alcoholism--last drink was 2010--attends Recovery Meetings at his [employer]   • Drug use: No   • Sexual activity: Yes     Partners: Female      Birth control/ protection: Post-Menopausal     Other Topics Concern   • Not on file     Social History Narrative   • No narrative on file       Family History:  Family History   Problem Relation Age of Onset   • Alcohol/Drug Mother    • Alcohol/Drug Father    • Stroke Father    • Stroke Sister    • GI Sister         cirrhosis   • Heart Disease Sister        Medications:    Current Outpatient Prescriptions:   •  Empagliflozin-metFORMIN HCl ER (SYNJARDY XR) 12.5-1000 MG TABLET SR 24 HR, Take 1 tablet by mouth 2 Times a Day., Disp: 60 Tab, Rfl: 6  •  pioglitazone (ACTOS) 15 MG Tab, Take 1 Tab by mouth every day., Disp: 90 Tab, Rfl: 2  •  Insulin Degludec (TRESIBA FLEXTOUCH) 200 UNIT/ML Solution Pen-injector, Inject 50 Units as instructed every bedtime., Disp: 3 PEN, Rfl: 2  •  Semaglutide (OZEMPIC) 1 MG/DOSE Solution Pen-injector, Inject 1 mg as instructed every 7 days., Disp: 2 PEN, Rfl: 11  •  metoprolol (LOPRESSOR) 100 MG Tab, metoprolol tartrate 100 mg tablet, Disp: , Rfl:   •  glipiZIDE (GLUCOTROL) 10 MG Tab, glipizide 10 mg tablet  Take 1 tablet every day by oral route., Disp: , Rfl:   •  metformin (GLUCOPHAGE) 1000 MG tablet, metformin 1,000 mg tablet, Disp: , Rfl:   •  Multiple Vitamin (MULTI-VITAMIN DAILY) Tab, Take  by mouth., Disp: , Rfl:   •  ondansetron (ZOFRAN) 4 MG Tab tablet, Take 1 Tab by mouth every four hours as needed for Nausea/Vomiting., Disp: 20 Tab, Rfl: 1  •  tadalafil (CIALIS) 10 MG tablet, Take 1 Tab by mouth as needed for Erectile Dysfunction., Disp: 10 Tab, Rfl: 3  •  chlorthalidone (HYGROTON) 25 MG Tab, TAKE 1 TAB BY MOUTH EVERY DAY., Disp: 90 Tab, Rfl: 3  •  clopidogrel (PLAVIX) 75 MG Tab, TAKE ONE TABLET BY MOUTH DAILY (Patient not taking: Reported on 7/15/2019), Disp: 90 Tab, Rfl: 3  •  pravastatin (PRAVACHOL) 40 MG tablet, TAKE 1 TAB BY MOUTH EVERY DAY., Disp: 90 Tab, Rfl: 3  •  acetaminophen (TYLENOL) 500 MG Tab, Take 1,000 mg by mouth 1 time daily as needed., Disp: , Rfl:   •   "Cholecalciferol (VITAMIN D3) 2000 UNIT Cap, Take 1 Cap by mouth every day., Disp: , Rfl:   •  loratadine (CLARITIN) 10 MG Tab, Take 10 mg by mouth every day., Disp: , Rfl:   •  Cyanocobalamin (VITAMIN B-12 PO), Take 1 Tab by mouth every day., Disp: , Rfl:         Physical Examination:   Vital signs: /72 (BP Location: Left arm, Patient Position: Sitting)   Pulse (!) 104   Ht 1.88 m (6' 2\")   Wt 93.9 kg (207 lb)   SpO2 97%   BMI 26.58 kg/m²   General: No distress, cooperative, well dressed and well nourished.   Eyes: No scleral icterus or discharge, No hyposphagma  ENMT: Normal on external inspection of nose, lips, No nasal drainage   Neck: No abnormal masses on inspection  Resp: Normal effort, Bilateral clear to auscultation, No wheezing, No rales  CVS: Regular rate and rhythm, S1 S2 normal, No murmur. No gallop  Extremities: No edema bilateral extremities  Neuro: Alert and oriented  Skin: No rash, No Ulcers  Psych: Normal mood and affect      Assessment and Plan:    1. Type 2 diabetes mellitus without complication, unspecified whether long term insulin use (HCC)    Now on:  1.  Synjardy 12.5/1000 one in the AM one in the PM  2.  Ozempic 1.0 once a week   3.  Actos 15mg one a day  4.  Tresiba 14 units at night before bed    2. Essential hypertension  This is stable today and no new changes are needed or required in today's visit    Return in about 6 weeks (around 8/30/2019).      Thank you kindly for allowing me to participate in the diabetes care plan for this patient.    Ruslan Zamora PA-C, BC-[facility]  Board Certified - Advanced Diabetes Management  07/19/19    CC:   Altaf Swenson D.O.    "

## 2019-07-19 NOTE — PATIENT INSTRUCTIONS
Now on:  1.  Synjardy 12.5/1000 one in the AM one in the PM  2.  Ozempic 1.0 once a week   3.  Actos 15mg one a day  4.  Tresiba 14 units at night before bed

## 2019-07-31 NOTE — TELEPHONE ENCOUNTER
Was the patient seen in the last year in this department? Yes    Does patient have an active prescription for medications requested? Yes    Received Request Via: Patient     **Last office visit 7/19/19**    Patient is also requesting Novofine plus 32G needles ( no active rx on file)

## 2019-08-18 DIAGNOSIS — E78.2 MIXED HYPERLIPIDEMIA: ICD-10-CM

## 2019-08-19 RX ORDER — CLOPIDOGREL BISULFATE 75 MG/1
TABLET ORAL
Qty: 90 TAB | Refills: 1 | Status: SHIPPED | OUTPATIENT
Start: 2019-08-19 | End: 2020-02-18

## 2019-08-19 RX ORDER — PRAVASTATIN SODIUM 40 MG
TABLET ORAL
Qty: 90 TAB | Refills: 3 | Status: SHIPPED | OUTPATIENT
Start: 2019-08-19 | End: 2020-04-08

## 2019-08-19 NOTE — TELEPHONE ENCOUNTER
Was the patient seen in the last year in this department? Yes lov 7/15/19    Does patient have an active prescription for medications requested? No     Received Request Via: Pharmacy

## 2019-08-19 NOTE — TELEPHONE ENCOUNTER
Was the patient seen in the last year in this department? Yes lov 7/19/19    Does patient have an active prescription for medications requested? No     Received Request Via: Pharmacy

## 2019-08-28 DIAGNOSIS — E11.9 TYPE II DIABETES MELLITUS, WELL CONTROLLED (HCC): ICD-10-CM

## 2019-08-28 RX ORDER — GLIPIZIDE 10 MG/1
TABLET ORAL
Qty: 180 TAB | Refills: 1 | Status: SHIPPED | OUTPATIENT
Start: 2019-08-28 | End: 2019-08-30

## 2019-08-28 NOTE — TELEPHONE ENCOUNTER
Was the patient seen in the last year in this department? Yes 7/19/2019    Does patient have an active prescription for medications requested? No     Received Request Via: Pharmacy

## 2019-08-30 ENCOUNTER — APPOINTMENT (OUTPATIENT)
Dept: ENDOCRINOLOGY | Facility: MEDICAL CENTER | Age: 59
End: 2019-08-30
Payer: COMMERCIAL

## 2019-08-30 ENCOUNTER — OFFICE VISIT (OUTPATIENT)
Dept: ENDOCRINOLOGY | Facility: MEDICAL CENTER | Age: 59
End: 2019-08-30

## 2019-08-30 VITALS
BODY MASS INDEX: 26.05 KG/M2 | HEIGHT: 74 IN | DIASTOLIC BLOOD PRESSURE: 80 MMHG | SYSTOLIC BLOOD PRESSURE: 120 MMHG | OXYGEN SATURATION: 98 % | HEART RATE: 100 BPM | WEIGHT: 203 LBS

## 2019-08-30 DIAGNOSIS — E11.9 TYPE 2 DIABETES MELLITUS WITHOUT COMPLICATION, UNSPECIFIED WHETHER LONG TERM INSULIN USE (HCC): ICD-10-CM

## 2019-08-30 DIAGNOSIS — I10 ESSENTIAL HYPERTENSION: ICD-10-CM

## 2019-08-30 DIAGNOSIS — E11.9 TYPE 2 DIABETES MELLITUS WITHOUT COMPLICATION, WITHOUT LONG-TERM CURRENT USE OF INSULIN (HCC): ICD-10-CM

## 2019-08-30 PROCEDURE — 99214 OFFICE O/P EST MOD 30 MIN: CPT | Performed by: PHYSICIAN ASSISTANT

## 2019-08-30 RX ORDER — PIOGLITAZONEHYDROCHLORIDE 15 MG/1
15 TABLET ORAL
Qty: 90 TAB | Refills: 2 | Status: SHIPPED | OUTPATIENT
Start: 2019-08-30 | End: 2020-03-06 | Stop reason: SDUPTHER

## 2019-08-30 NOTE — PROGRESS NOTES
Return to office Patient Consult Note  Referred by: Altaf Swenson D.O.    Reason for consult: Diabetes Management Type 2    HPI:  Gil Hart is a 59 y.o. old patient who is seeing us today for diabetes care.  This is a pleasant patient with diabetes and I appreciate the opportunity to participate in the care of this patient.    Labs of 6/5/2019 HbA1c is 9.6, GFR >60  Labs of 11/30/18 HbA1c is 9.3  Labs of 2/9/17 HbA1c is 8.3    BG Diary:8/30/2019  In the AM:  85, 78, 93, 88, 99, 90  Before Bed: 200, 194, 216, 237, 222, 248, 260    Today he is 203pounds and on 6/5/19 he was 225      1. Type 2 diabetes mellitus without complication, unspecified whether long term insulin use (HCC)  This is a new patient with me on 6/5/2019  They were on:  1.  Actos 15mg   2   Metformin 1000mg one in the AM one in the PM  3.  Glipizide 10mg     Stop:  2   Metformin 1000mg one in the AM one in the PM  3.  Glipizide 10mg     Now on:  1.  Synjardy 12.5/1000 one in the AM one in the PM  2.  Ozempic 1.0 once a week   3.  Actos 15mg one a day  4.  Tresiba 14 units at night before bed    2. Essential hypertension  This is stable today and no new changes are needed or required in today's visit      ROS:   Constitutional: No night sweats.  Eyes:  No visual changes.  Cardiac: No chest pain, No palpitations or racing heart rate.  Resp: No shortness of breath, No cough,   Gi: No Diarrhea    All other systems were reviewed and were/are negative.      Past Medical History:  Patient Active Problem List    Diagnosis Date Noted   • Stroke ([facility]) 04/12/2016     Priority: High   • Hyponatremia 05/29/2017     Priority: Medium   • Chronic pancreatitis ([facility]) 05/28/2017     Priority: Medium   • Dilated cbd, acquired 05/28/2017     Priority: Medium   • Type 2 diabetes mellitus without complication ([facility]) 02/05/2016     Priority: Medium   • CLL (chronic lymphocytic leukemia) ([facility]) 11/09/2015     Priority: Medium   • Hypertension 12/03/2013      Priority: Medium   • History of Amanda-en-Y gastric bypass 12/03/2013     Priority: Medium   • Normocytic normochromic anemia 05/29/2017     Priority: Low   • Mixed hyperlipidemia 12/03/2013     Priority: Low   • Chronic use of opiate drugs therapeutic purposes 08/19/2017   • NSAID-induced duodenal ulcer 06/26/2017   • Medical marijuana use 06/26/2017   • Obesity (BMI 30-39.9) 09/02/2016   • Family history of cerebral aneurysm 02/09/2016   • Low back pain radiating to right leg 12/03/2013   • Vitamin D deficiency disease 12/03/2013   • Personal history of alcoholism (HCC) 12/03/2013   • History of chronic pancreatitis 12/03/2013       Past Surgical History:  Past Surgical History:   Procedure Laterality Date   • LAMINOTOMY  6/6/2018    Procedure: LAMINOTOMY/T8;  Surgeon: Cammy Sam M.D.;  Location: [facility];  Service: Neurosurgery   • SPINAL CORD STIMULATOR  6/6/2018    Procedure: SPINAL CORD STIMULATOR/ PADDLE ELECTRODE DORSAL COLOMN STIMULATOR AND PULSE GENERATOR;  Surgeon: Cammy Sam M.D.;  Location: [facility];  Service: Neurosurgery   • GASTROSCOPY-ENDO N/A 5/30/2017    Procedure: GASTROSCOPY-ENDO;  Surgeon: Noe Paniagua M.D.;  Location: [facility];  Service:    • LUMBAR FUSION POSTERIOR  2012    L3-S1   • LAMINOTOMY  2010    X Stop   • BONE SPUR EXCISION  2008    right ankle bone spur removal   • HAND SURGERY  2006    removal of ganglion cyst left hand   • GASTRIC BYPASS LAPAROSCOPIC  2001    Roen Y   • CELIAC PLEXUS NEUROLYSIS      intermittent when necessary chronic pancreatic pains       Allergies:  Pcn [penicillins]    Social History:  Social History     Socioeconomic History   • Marital status:      Spouse name: Not on file   • Number of children: Not on file   • Years of education: Not on file   • Highest education level: Not on file   Occupational History   • Not on file   Social Needs   • Financial resource strain: Not on file   • Food  insecurity:     Worry: Not on file     Inability: Not on file   • Transportation needs:     Medical: Not on file     Non-medical: Not on file   Tobacco Use   • Smoking status: Never Smoker   • Smokeless tobacco: Never Used   Substance and Sexual Activity   • Alcohol use: No     Comment: Past history of alcoholism--last drink was 2010--attends Recovery Meetings at his [employer]   • Drug use: No   • Sexual activity: Yes     Partners: Female     Birth control/protection: Post-Menopausal   Lifestyle   • Physical activity:     Days per week: Not on file     Minutes per session: Not on file   • Stress: Not on file   Relationships   • Social connections:     Talks on phone: Not on file     Gets together: Not on file     Attends Alevism service: Not on file     Active member of club or organization: Not on file     Attends meetings of clubs or organizations: Not on file     Relationship status: Not on file   • Intimate partner violence:     Fear of current or ex partner: Not on file     Emotionally abused: Not on file     Physically abused: Not on file     Forced sexual activity: Not on file   Other Topics Concern   • Not on file   Social History Narrative   • Not on file       Family History:  Family History   Problem Relation Age of Onset   • Alcohol/Drug Mother    • Alcohol/Drug Father    • Stroke Father    • Stroke Sister    • GI Disease Sister         cirrhosis   • Heart Disease Sister        Medications:    Current Outpatient Medications:   •  glipiZIDE (GLUCOTROL) 10 MG Tab, TAKE 1 TABLET BY MOUTH TWICE A DAY, Disp: 180 Tab, Rfl: 1  •  pravastatin (PRAVACHOL) 40 MG tablet, TAKE 1 TABLET BY MOUTH EVERY DAY, Disp: 90 Tab, Rfl: 3  •  clopidogrel (PLAVIX) 75 MG Tab, TAKE 1 TABLET BY MOUTH EVERY DAY, Disp: 90 Tab, Rfl: 1  •  Insulin Pen Needle 32 G x 4 mm (NOVOFINE PLUS), 1 Applicator by Does not apply route every day. Using one needle tip with tresiba per day, Disp: 100 Each, Rfl: 3  •  Insulin Degludec (TRESIBA  "FLEXTOUCH) 200 UNIT/ML Solution Pen-injector, Inject 50 Units as instructed every bedtime., Disp: 3 PEN, Rfl: 2  •  Empagliflozin-metFORMIN HCl ER (SYNJARDY XR) 12.5-1000 MG TABLET SR 24 HR, Take 1 tablet by mouth 2 Times a Day., Disp: 60 Tab, Rfl: 6  •  pioglitazone (ACTOS) 15 MG Tab, Take 1 Tab by mouth every day., Disp: 90 Tab, Rfl: 2  •  Semaglutide (OZEMPIC) 1 MG/DOSE Solution Pen-injector, Inject 1 mg as instructed every 7 days., Disp: 2 PEN, Rfl: 11  •  metoprolol (LOPRESSOR) 100 MG Tab, metoprolol tartrate 100 mg tablet, Disp: , Rfl:   •  glipiZIDE (GLUCOTROL) 10 MG Tab, glipizide 10 mg tablet  Take 1 tablet every day by oral route., Disp: , Rfl:   •  metformin (GLUCOPHAGE) 1000 MG tablet, metformin 1,000 mg tablet, Disp: , Rfl:   •  Multiple Vitamin (MULTI-VITAMIN DAILY) Tab, Take  by mouth., Disp: , Rfl:   •  ondansetron (ZOFRAN) 4 MG Tab tablet, Take 1 Tab by mouth every four hours as needed for Nausea/Vomiting., Disp: 20 Tab, Rfl: 1  •  tadalafil (CIALIS) 10 MG tablet, Take 1 Tab by mouth as needed for Erectile Dysfunction., Disp: 10 Tab, Rfl: 3  •  chlorthalidone (HYGROTON) 25 MG Tab, TAKE 1 TAB BY MOUTH EVERY DAY., Disp: 90 Tab, Rfl: 3  •  acetaminophen (TYLENOL) 500 MG Tab, Take 1,000 mg by mouth 1 time daily as needed., Disp: , Rfl:   •  Cholecalciferol (VITAMIN D3) 2000 UNIT Cap, Take 1 Cap by mouth every day., Disp: , Rfl:   •  loratadine (CLARITIN) 10 MG Tab, Take 10 mg by mouth every day., Disp: , Rfl:   •  Cyanocobalamin (VITAMIN B-12 PO), Take 1 Tab by mouth every day., Disp: , Rfl:         Physical Examination:   Vital signs: /80 (BP Location: Left arm, Patient Position: Sitting)   Pulse 100   Ht 1.88 m (6' 2\")   Wt 92.1 kg (203 lb)   SpO2 98%   BMI 26.06 kg/m²   General: No distress, cooperative, well dressed and well nourished.   Eyes: No scleral icterus or discharge, No hyposphagma  ENMT: Normal on external inspection of nose, lips, No nasal drainage   Neck: No abnormal masses on " inspection  Resp: Normal effort, Bilateral clear to auscultation, No wheezing, No rales  CVS: Regular rate and rhythm, S1 S2 normal, No murmur. No gallop  Extremities: No edema bilateral extremities  Neuro: Alert and oriented  Skin: No rash, No Ulcers  Psych: Normal mood and affect      Assessment and Plan:    1. Type 2 diabetes mellitus without complication, unspecified whether long term insulin use (HCC)    Now on:  1.  Synjardy 12.5/1000 one in the AM one in the PM  2.  Ozempic 1.0 once a week   3.  Actos 15mg one a day  4.  Tresiba 14 units at night before bed      2. Essential hypertension  This is stable today and no new changes are needed or required in today's visit    Return in about 3 months (around 11/30/2019).      Thank you kindly for allowing me to participate in the diabetes care plan for this patient.    Ruslan Zamora PA-C, BC-ADM  Board Certified - Advanced Diabetes Management  08/30/19    CC:   Altaf Swenson D.O.

## 2019-09-17 ENCOUNTER — HOSPITAL ENCOUNTER (OUTPATIENT)
Dept: RADIOLOGY | Facility: MEDICAL CENTER | Age: 59
End: 2019-09-17
Attending: INTERNAL MEDICINE
Payer: COMMERCIAL

## 2019-09-17 DIAGNOSIS — M54.2 NECK PAIN: ICD-10-CM

## 2019-09-17 PROCEDURE — 72040 X-RAY EXAM NECK SPINE 2-3 VW: CPT

## 2019-09-23 ENCOUNTER — PATIENT MESSAGE (OUTPATIENT)
Dept: MEDICAL GROUP | Facility: LAB | Age: 59
End: 2019-09-23

## 2019-09-23 DIAGNOSIS — R93.89 ABNORMAL FINDING OF DIAGNOSTIC IMAGING: ICD-10-CM

## 2019-09-23 NOTE — TELEPHONE ENCOUNTER
From: Gil Hart  To: Altaf Swenson D.O.  Sent: 9/23/2019 2:26 PM PDT  Subject: Test Result Question    I have a question about DX-CERVICAL SPINE-2 OR 3 VIEWS resulted on 9/17/19 at 1:09 PM.  Both Lanie and I have a serious concern about the Carotid Artery Plaque and want to see a cardiologist regarding this. Can you make a referral for me please.    Thanks,  Robert Hart

## 2019-09-25 NOTE — TELEPHONE ENCOUNTER
From: Gil Hart  To: Altaf Swenson D.O.  Sent: 9/23/2019 8:21 PM PDT  Subject: Test Result Question    Ok great, thank you.    ----- Message -----  From: Altaf Swenson D.O.  Sent: 9/23/19 5:38 PM  To: Gil Hart  Subject: RE: Test Result Question    Gil:  With atherosclerotic plaque in the carotid arteries ,typically, you are sent to vascular surgery, but they will want to see an ultrasound first which I have ordered!  Regards, Altaf Swenson, DO      ----- Message -----   From: Gil Hart   Sent: 9/23/2019 2:26 PM PDT   To: Altaf Swenson D.O.  Subject: Test Result Question    I have a question about DX-CERVICAL SPINE-2 OR 3 VIEWS resulted on 9/17/19 at 1:09 PM.  Both Lanie and I have a serious concern about the Carotid Artery Plaque and want to see a cardiologist regarding this. Can you make a referral for me please.    Thanks,  Robert Hart

## 2019-10-04 ENCOUNTER — HOSPITAL ENCOUNTER (OUTPATIENT)
Dept: RADIOLOGY | Facility: MEDICAL CENTER | Age: 59
End: 2019-10-04
Attending: INTERNAL MEDICINE
Payer: COMMERCIAL

## 2019-10-04 DIAGNOSIS — R93.89 ABNORMAL FINDING OF DIAGNOSTIC IMAGING: ICD-10-CM

## 2019-10-04 PROCEDURE — 93880 EXTRACRANIAL BILAT STUDY: CPT

## 2019-10-04 PROCEDURE — 93880 EXTRACRANIAL BILAT STUDY: CPT | Mod: 26 | Performed by: INTERNAL MEDICINE

## 2019-10-06 DIAGNOSIS — I63.30 CEREBROVASCULAR ACCIDENT (CVA) DUE TO THROMBOSIS OF CEREBRAL ARTERY (HCC): ICD-10-CM

## 2019-10-06 DIAGNOSIS — I10 ESSENTIAL HYPERTENSION: ICD-10-CM

## 2019-10-07 RX ORDER — AMLODIPINE BESYLATE 5 MG/1
TABLET ORAL
Qty: 30 TAB | Refills: 6 | Status: SHIPPED | OUTPATIENT
Start: 2019-10-07 | End: 2020-03-06

## 2019-10-11 ENCOUNTER — PATIENT MESSAGE (OUTPATIENT)
Dept: MEDICAL GROUP | Facility: LAB | Age: 59
End: 2019-10-11

## 2019-12-03 DIAGNOSIS — N52.9 ERECTILE DYSFUNCTION, UNSPECIFIED ERECTILE DYSFUNCTION TYPE: ICD-10-CM

## 2019-12-04 ENCOUNTER — PATIENT MESSAGE (OUTPATIENT)
Dept: MEDICAL GROUP | Facility: LAB | Age: 59
End: 2019-12-04

## 2019-12-04 DIAGNOSIS — N52.9 ERECTILE DYSFUNCTION, UNSPECIFIED ERECTILE DYSFUNCTION TYPE: ICD-10-CM

## 2019-12-04 RX ORDER — TADALAFIL 10 MG/1
TABLET ORAL
Qty: 10 TAB | Refills: 2 | Status: SHIPPED | OUTPATIENT
Start: 2019-12-04 | End: 2019-12-04 | Stop reason: SDUPTHER

## 2019-12-04 RX ORDER — TADALAFIL 10 MG/1
TABLET ORAL
Qty: 10 TAB | Refills: 2 | Status: SHIPPED | OUTPATIENT
Start: 2019-12-04 | End: 2020-09-09

## 2019-12-04 NOTE — TELEPHONE ENCOUNTER
----- Message from Gil Hart sent at 12/4/2019  3:20 PM PST -----  Regarding: Prescription Question  Contact: [phone]  Hello, can you send a prescription to the [facility] for the 20mg of Cialis.    Thank you,  Robert

## 2019-12-04 NOTE — TELEPHONE ENCOUNTER
Was the patient seen in the last year in this department? Yes  7/15/19  Does patient have an active prescription for medications requested? No     Received Request Via: Pharmacy

## 2019-12-04 NOTE — TELEPHONE ENCOUNTER
----- Message from Gil aHrt sent at 12/4/2019  3:20 PM PST -----  Regarding: Prescription Question  Contact: [phone]  Hello, can you send a prescription to the [facility] for the 20mg of Cialis.    Thank you,  Robert

## 2019-12-04 NOTE — PATIENT COMMUNICATION
Was the patient seen in the last year in this department? Yes  LOV 7/15/19        Does patient have an active prescription for medications requested? Yes    Received Request Via: Patient   20mg of Cialis.

## 2019-12-06 ENCOUNTER — OFFICE VISIT (OUTPATIENT)
Dept: ENDOCRINOLOGY | Facility: MEDICAL CENTER | Age: 59
End: 2019-12-06
Payer: COMMERCIAL

## 2019-12-06 VITALS
HEIGHT: 74 IN | SYSTOLIC BLOOD PRESSURE: 130 MMHG | OXYGEN SATURATION: 93 % | WEIGHT: 192 LBS | HEART RATE: 97 BPM | DIASTOLIC BLOOD PRESSURE: 80 MMHG | BODY MASS INDEX: 24.64 KG/M2

## 2019-12-06 DIAGNOSIS — E11.9 TYPE 2 DIABETES MELLITUS WITHOUT COMPLICATION, UNSPECIFIED WHETHER LONG TERM INSULIN USE (HCC): ICD-10-CM

## 2019-12-06 DIAGNOSIS — I10 ESSENTIAL HYPERTENSION: ICD-10-CM

## 2019-12-06 LAB
HBA1C MFR BLD: 6.8 % (ref 0–5.6)
INT CON NEG: NEGATIVE
INT CON POS: POSITIVE

## 2019-12-06 PROCEDURE — 99214 OFFICE O/P EST MOD 30 MIN: CPT | Performed by: PHYSICIAN ASSISTANT

## 2019-12-06 PROCEDURE — 83036 HEMOGLOBIN GLYCOSYLATED A1C: CPT | Performed by: PHYSICIAN ASSISTANT

## 2019-12-07 NOTE — PATIENT INSTRUCTIONS
Now on:  1.  Synjardy 12.5/1000 one in the AM one in the PM  2.  Ozempic 1.0 once a week (Stop)  3.  Actos 15mg one a day  4.  Tresiba 14 units at night before bed  5.  Trulicity 0.75 once a week for 2 weeks then INCREASE to 1.5

## 2019-12-07 NOTE — PROGRESS NOTES
Return to office Patient Consult Note  Referred by: Altaf Swenson D.O.    Reason for consult: Diabetes Management Type 2    HPI:  Gil Hart is a 59 y.o. old patient who is seeing us today for diabetes care.  This is a pleasant patient with diabetes and I appreciate the opportunity to participate in the care of this patient.    Labs of 12/6/2019 HbA1c is 6.8  Labs of 6/5/2019 HbA1c is 9.6, GFR >60  Labs of 11/30/18 HbA1c is 9.3  Labs of 2/9/17 HbA1c is 8.3    BG Diary:12/6/2019  In the AM:  No log    Today he is 192 pounds and on 6/5/19 he was 225      1. Type 2 diabetes mellitus without complication, unspecified whether long term insulin use (HCC)  This is a new patient with me on 6/5/2019  They were on:  1.  Actos 15mg   2   Metformin 1000mg one in the AM one in the PM  3.  Glipizide 10mg     Now on:  1.  Synjardy 12.5/1000 one in the AM one in the PM  2.  Ozempic 1.0 once a week   3.  Actos 15mg one a day  4.  Tresiba 14 units at night before bed     2. Essential hypertension  This is stable today and no new changes are needed or required in today's visit        ROS:   Constitutional: No night sweats.  Eyes:  No visual changes.  Cardiac: No chest pain, No palpitations or racing heart rate.  Resp: No shortness of breath, No cough,   Gi: No Diarrhea    All other systems were reviewed and were/are negative.      Past Medical History:  Patient Active Problem List    Diagnosis Date Noted   • Stroke ([facility]) 04/12/2016     Priority: High   • Hyponatremia 05/29/2017     Priority: Medium   • Chronic pancreatitis (HCC) 05/28/2017     Priority: Medium   • Dilated cbd, acquired 05/28/2017     Priority: Medium   • Type 2 diabetes mellitus without complication ([facility]) 02/05/2016     Priority: Medium   • CLL (chronic lymphocytic leukemia) ([facility]) 11/09/2015     Priority: Medium   • Hypertension 12/03/2013     Priority: Medium   • History of Amanda-en-Y gastric bypass 12/03/2013     Priority: Medium   • Normocytic  normochromic anemia 05/29/2017     Priority: Low   • Mixed hyperlipidemia 12/03/2013     Priority: Low   • Chronic use of opiate drugs therapeutic purposes 08/19/2017   • NSAID-induced duodenal ulcer 06/26/2017   • Medical marijuana use 06/26/2017   • Obesity (BMI 30-39.9) 09/02/2016   • Family history of cerebral aneurysm 02/09/2016   • Low back pain radiating to right leg 12/03/2013   • Vitamin D deficiency disease 12/03/2013   • Personal history of alcoholism (HCC) 12/03/2013   • History of chronic pancreatitis 12/03/2013       Past Surgical History:  Past Surgical History:   Procedure Laterality Date   • LAMINOTOMY  6/6/2018    Procedure: LAMINOTOMY/T8;  Surgeon: Cammy Sam M.D.;  Location: SURGERY [facility];  Service: Neurosurgery   • SPINAL CORD STIMULATOR  6/6/2018    Procedure: SPINAL CORD STIMULATOR/ PADDLE ELECTRODE DORSAL COLOMN STIMULATOR AND PULSE GENERATOR;  Surgeon: Cammy Sam M.D.;  Location: SURGERY [facility];  Service: Neurosurgery   • GASTROSCOPY-ENDO N/A 5/30/2017    Procedure: GASTROSCOPY-ENDO;  Surgeon: Noe Paniagua M.D.;  Location: SURGERY [facility];  Service:    • LUMBAR FUSION POSTERIOR  2012    L3-S1   • LAMINOTOMY  2010    X Stop   • BONE SPUR EXCISION  2008    right ankle bone spur removal   • HAND SURGERY  2006    removal of ganglion cyst left hand   • GASTRIC BYPASS LAPAROSCOPIC  2001    Roen Y   • CELIAC PLEXUS NEUROLYSIS      intermittent when necessary chronic pancreatic pains       Allergies:  Pcn [penicillins]    Social History:  Social History     Socioeconomic History   • Marital status:      Spouse name: Not on file   • Number of children: Not on file   • Years of education: Not on file   • Highest education level: Not on file   Occupational History   • Not on file   Social Needs   • Financial resource strain: Not on file   • Food insecurity:     Worry: Not on file     Inability: Not on file   • Transportation needs:     Medical: Not  on file     Non-medical: Not on file   Tobacco Use   • Smoking status: Never Smoker   • Smokeless tobacco: Never Used   Substance and Sexual Activity   • Alcohol use: No     Comment: Past history of alcoholism--last drink was 2010--attends Recovery Meetings at his [employer]   • Drug use: No   • Sexual activity: Yes     Partners: Female     Birth control/protection: Post-Menopausal   Lifestyle   • Physical activity:     Days per week: Not on file     Minutes per session: Not on file   • Stress: Not on file   Relationships   • Social connections:     Talks on phone: Not on file     Gets together: Not on file     Attends Nondenominational service: Not on file     Active member of club or organization: Not on file     Attends meetings of clubs or organizations: Not on file     Relationship status: Not on file   • Intimate partner violence:     Fear of current or ex partner: Not on file     Emotionally abused: Not on file     Physically abused: Not on file     Forced sexual activity: Not on file   Other Topics Concern   • Not on file   Social History Narrative   • Not on file       Family History:  Family History   Problem Relation Age of Onset   • Alcohol/Drug Mother    • Alcohol/Drug Father    • Stroke Father    • Stroke Sister    • GI Disease Sister         cirrhosis   • Heart Disease Sister        Medications:    Current Outpatient Medications:   •  tadalafil (CIALIS) 10 MG tablet, TAKE ONE TABLET BY MOUTH ONCE DAILY as needed for erectile dysfunction, Disp: 10 Tab, Rfl: 2  •  amLODIPine (NORVASC) 5 MG Tab, TAKE 1 TABLET BY MOUTH EVERY DAY, Disp: 30 Tab, Rfl: 6  •  pioglitazone (ACTOS) 15 MG Tab, Take 1 Tab by mouth every day., Disp: 90 Tab, Rfl: 2  •  Insulin Degludec (TRESIBA FLEXTOUCH) 200 UNIT/ML Solution Pen-injector, Inject 20-60 Units as instructed every bedtime., Disp: 3 PEN, Rfl: 2  •  Empagliflozin-metFORMIN HCl ER (SYNJARDY XR) 12.5-1000 MG TABLET SR 24 HR, Take 1 tablet by mouth 2 Times a Day., Disp: 60 Tab, Rfl:  "6  •  pravastatin (PRAVACHOL) 40 MG tablet, TAKE 1 TABLET BY MOUTH EVERY DAY, Disp: 90 Tab, Rfl: 3  •  clopidogrel (PLAVIX) 75 MG Tab, TAKE 1 TABLET BY MOUTH EVERY DAY, Disp: 90 Tab, Rfl: 1  •  Insulin Pen Needle 32 G x 4 mm (NOVOFINE PLUS), 1 Applicator by Does not apply route every day. Using one needle tip with tresiba per day, Disp: 100 Each, Rfl: 3  •  Multiple Vitamin (MULTI-VITAMIN DAILY) Tab, Take  by mouth., Disp: , Rfl:   •  ondansetron (ZOFRAN) 4 MG Tab tablet, Take 1 Tab by mouth every four hours as needed for Nausea/Vomiting., Disp: 20 Tab, Rfl: 1  •  chlorthalidone (HYGROTON) 25 MG Tab, TAKE 1 TAB BY MOUTH EVERY DAY., Disp: 90 Tab, Rfl: 3  •  acetaminophen (TYLENOL) 500 MG Tab, Take 1,000 mg by mouth 1 time daily as needed., Disp: , Rfl:   •  Cholecalciferol (VITAMIN D3) 2000 UNIT Cap, Take 1 Cap by mouth every day., Disp: , Rfl:   •  loratadine (CLARITIN) 10 MG Tab, Take 10 mg by mouth every day., Disp: , Rfl:   •  Cyanocobalamin (VITAMIN B-12 PO), Take 1 Tab by mouth every day., Disp: , Rfl:         Physical Examination:   Vital signs: /80 (BP Location: Left arm, Patient Position: Sitting)   Pulse 97   Ht 1.88 m (6' 2\")   Wt 87.1 kg (192 lb)   SpO2 93%   BMI 24.65 kg/m²   General: No distress, cooperative, well dressed and well nourished.   Eyes: No scleral icterus or discharge, No hyposphagma  ENMT: Normal on external inspection of nose, lips, No nasal drainage   Neck: No abnormal masses on inspection  Resp: Normal effort, Bilateral clear to auscultation, No wheezing, No rales  CVS: Regular rate and rhythm, S1 S2 normal, No murmur. No gallop  Extremities: No edema bilateral extremities  Neuro: Alert and oriented  Skin: No rash, No Ulcers  Psych: Normal mood and affect      Assessment and Plan:    1. Type 2 diabetes mellitus without complication, unspecified whether long term insulin use (HCC)    Now on:  1.  Synjardy 12.5/1000 one in the AM one in the PM  2.  Ozempic 1.0 once a week " (Stop)  3.  Actos 15mg one a day  4.  Tresiba 14 units at night before bed  5.  Trulicity 0.75 once a week for 2 weeks then INCREASE to 1.5    2. Essential hypertension  This is stable today and no new changes are needed or required in today's visit    Return in about 3 months (around 3/6/2020).    Thank you kindly for allowing me to participate in the diabetes care plan for this patient.    Ruslan Zamora PA-C, BC-ADM  Board Certified - Advanced Diabetes Management  12/06/19    CC:   Altaf Swenson D.O.

## 2019-12-11 ENCOUNTER — TELEPHONE (OUTPATIENT)
Dept: ENDOCRINOLOGY | Facility: MEDICAL CENTER | Age: 59
End: 2019-12-11

## 2019-12-11 NOTE — TELEPHONE ENCOUNTER
DOCUMENTATION OF PAR STATUS:    1. Name of Medication & Dose: Ozempic1 mg     2. Name of Prescription Coverage Company & phone #: [employer] Ph: [phone]    3. Date Prior Auth Submitted: 12/11/19    4. What information was given to obtain insurance decision? Diabetes, chart notes and labs    5. Prior Auth Status? Pending    6. Action Taken: Pharmacy Notified: yes

## 2019-12-18 ENCOUNTER — APPOINTMENT (RX ONLY)
Dept: URBAN - METROPOLITAN AREA CLINIC 20 | Facility: CLINIC | Age: 59
Setting detail: DERMATOLOGY
End: 2019-12-18

## 2019-12-18 DIAGNOSIS — L72.0 EPIDERMAL CYST: ICD-10-CM

## 2019-12-18 PROCEDURE — 10060 I&D ABSCESS SIMPLE/SINGLE: CPT

## 2019-12-18 PROCEDURE — ? INCISION AND DRAINAGE

## 2019-12-18 PROCEDURE — ? PRESCRIPTION

## 2019-12-18 PROCEDURE — ? ADDITIONAL NOTES

## 2019-12-18 PROCEDURE — ? COUNSELING

## 2019-12-18 ASSESSMENT — LOCATION SIMPLE DESCRIPTION DERM: LOCATION SIMPLE: RIGHT SHOULDER

## 2019-12-18 ASSESSMENT — LOCATION DETAILED DESCRIPTION DERM: LOCATION DETAILED: RIGHT POSTERIOR SHOULDER

## 2019-12-18 ASSESSMENT — LOCATION ZONE DERM: LOCATION ZONE: ARM

## 2019-12-18 NOTE — PROCEDURE: INCISION AND DRAINAGE
Post-Care Instructions: I reviewed with the patient in detail post-care instructions. Patient should keep wound covered and call the office should any redness, pain, swelling or worsening occur.
Epidermal Sutures: 4-0 Ethilon
Anesthesia Volume In Cc: 1.5
Lesion Type: Abscess
Dressing: telfa dressing
Render Postcare In Note?: No
Anesthesia Type: 1% lidocaine with epinephrine and a 1:10 solution of 8.4% sodium bicarbonate
Consent was obtained and risks were reviewed including but not limited to delayed wound healing, infection, need for multiple I and D's, and pain.
Method: 11 blade
Preparation Text: The area was prepped in the usual clean fashion.
Suture Text: The incision was partially closed with
Epidermal Closure: simple interrupted
Curette: Yes
Detail Level: Detailed
Size Of Lesion In Cm (Optional But May Be Required For Some Insurances): 4.5
Curette Text (Optional): After the contents were expressed a curette was used to partially remove the cyst wall.
Wound Care: Petrolatum

## 2019-12-20 ENCOUNTER — APPOINTMENT (RX ONLY)
Dept: URBAN - METROPOLITAN AREA CLINIC 22 | Facility: CLINIC | Age: 59
Setting detail: DERMATOLOGY
End: 2019-12-20

## 2019-12-20 DIAGNOSIS — Z48.817 ENCOUNTER FOR SURGICAL AFTERCARE FOLLOWING SURGERY ON THE SKIN AND SUBCUTANEOUS TISSUE: ICD-10-CM

## 2019-12-20 PROCEDURE — ? POST-OP WOUND CHECK

## 2019-12-20 ASSESSMENT — LOCATION ZONE DERM: LOCATION ZONE: ARM

## 2019-12-20 ASSESSMENT — LOCATION DETAILED DESCRIPTION DERM: LOCATION DETAILED: RIGHT POSTERIOR SHOULDER

## 2019-12-20 ASSESSMENT — LOCATION SIMPLE DESCRIPTION DERM: LOCATION SIMPLE: RIGHT SHOULDER

## 2019-12-20 NOTE — PROCEDURE: POST-OP WOUND CHECK
Detail Level: Detailed
Add 13748 Cpt? (Important Note: In 2017 The Use Of 41724 Is Being Tracked By Cms To Determine Future Global Period Reimbursement For Global Periods): no
Additional Comments: Iodofoam was completely removed today.

## 2020-02-18 RX ORDER — CLOPIDOGREL BISULFATE 75 MG/1
TABLET ORAL
Qty: 90 TAB | Refills: 1 | Status: SHIPPED | OUTPATIENT
Start: 2020-02-18 | End: 2020-08-17

## 2020-02-18 NOTE — TELEPHONE ENCOUNTER
Received request via: Pharmacy    Was the patient seen in the last year in this department? Yes  7/15/19  Does the patient have an active prescription (recently filled or refills available) for medication(s) requested? No

## 2020-03-06 ENCOUNTER — OFFICE VISIT (OUTPATIENT)
Dept: ENDOCRINOLOGY | Facility: MEDICAL CENTER | Age: 60
End: 2020-03-06
Payer: COMMERCIAL

## 2020-03-06 VITALS
HEART RATE: 98 BPM | BODY MASS INDEX: 25.41 KG/M2 | WEIGHT: 198 LBS | SYSTOLIC BLOOD PRESSURE: 156 MMHG | HEIGHT: 74 IN | OXYGEN SATURATION: 99 % | DIASTOLIC BLOOD PRESSURE: 86 MMHG

## 2020-03-06 DIAGNOSIS — E11.9 TYPE 2 DIABETES MELLITUS WITHOUT COMPLICATION, WITH LONG-TERM CURRENT USE OF INSULIN (HCC): ICD-10-CM

## 2020-03-06 DIAGNOSIS — Z79.4 TYPE 2 DIABETES MELLITUS WITHOUT COMPLICATION, WITH LONG-TERM CURRENT USE OF INSULIN (HCC): ICD-10-CM

## 2020-03-06 DIAGNOSIS — E11.9 TYPE 2 DIABETES MELLITUS WITHOUT COMPLICATION, WITHOUT LONG-TERM CURRENT USE OF INSULIN (HCC): ICD-10-CM

## 2020-03-06 LAB
HBA1C MFR BLD: 7.6 % (ref 0–5.6)
INT CON NEG: ABNORMAL
INT CON POS: ABNORMAL

## 2020-03-06 PROCEDURE — 83036 HEMOGLOBIN GLYCOSYLATED A1C: CPT | Performed by: PHYSICIAN ASSISTANT

## 2020-03-06 PROCEDURE — 99213 OFFICE O/P EST LOW 20 MIN: CPT | Performed by: PHYSICIAN ASSISTANT

## 2020-03-06 RX ORDER — EMPAGLIFLOZIN, METFORMIN HYDROCHLORIDE 12.5; 1 MG/1; MG/1
1 TABLET, EXTENDED RELEASE ORAL 2 TIMES DAILY
Qty: 60 TAB | Refills: 6 | Status: SHIPPED | OUTPATIENT
Start: 2020-03-06 | End: 2021-12-08

## 2020-03-06 RX ORDER — DULAGLUTIDE 1.5 MG/.5ML
0.5 INJECTION, SOLUTION SUBCUTANEOUS
Qty: 4 PEN | Refills: 11 | Status: SHIPPED | OUTPATIENT
Start: 2020-03-06 | End: 2021-06-09

## 2020-03-06 RX ORDER — INSULIN DEGLUDEC 200 U/ML
20-60 INJECTION, SOLUTION SUBCUTANEOUS
Qty: 3 PEN | Refills: 2 | Status: SHIPPED | OUTPATIENT
Start: 2020-03-06 | End: 2024-03-19

## 2020-03-06 RX ORDER — PIOGLITAZONEHYDROCHLORIDE 15 MG/1
15 TABLET ORAL
Qty: 90 TAB | Refills: 2 | Status: SHIPPED | OUTPATIENT
Start: 2020-03-06

## 2020-03-06 ASSESSMENT — FIBROSIS 4 INDEX: FIB4 SCORE: 1.84

## 2020-03-06 NOTE — PROGRESS NOTES
Return to office Patient Consult Note  Referred by: Altaf Swenson D.O.    Reason for consult: Diabetes Management Type 2    HPI:  Gil Hart is a 59 y.o. old patient who is seeing us today for diabetes care.  This is a pleasant patient with diabetes and I appreciate the opportunity to participate in the care of this patient.    Labs of 3/6/2020 HbA1c is 7.6  Labs of 12/6/2019 HbA1c is 6.8  Labs of 6/5/2019 HbA1c is 9.6, GFR >60  Labs of 11/30/18 HbA1c is 9.3  Labs of 2/9/17 HbA1c is 8.3         1. Type 2 diabetes mellitus without complication, with long-term current use of insulin (HCC)    This is a new patient with me on 6/5/2019  They were on:  1.  Actos 15mg   2   Metformin 1000mg one in the AM one in the PM  3.  Glipizide 10mg     Now on:  1.  Synjardy 12.5/1000 one in the AM one in the PM  3.  Actos 15mg one a day  4.  Tresiba 14 units at night before bed  5.  Trulicity 1.5 once a week    ozempic caused nausea      ROS:   Cardiac: No chest pain,   Resp: No shortness of breath,   Gi: No Diarrhea        Past Medical History:  Patient Active Problem List    Diagnosis Date Noted   • Stroke ([facility]) 04/12/2016     Priority: High   • Hyponatremia 05/29/2017     Priority: Medium   • Chronic pancreatitis ([facility]) 05/28/2017     Priority: Medium   • Dilated cbd, acquired 05/28/2017     Priority: Medium   • Type 2 diabetes mellitus without complication ([facility]) 02/05/2016     Priority: Medium   • CLL (chronic lymphocytic leukemia) ([facility]) 11/09/2015     Priority: Medium   • Hypertension 12/03/2013     Priority: Medium   • History of Amanda-en-Y gastric bypass 12/03/2013     Priority: Medium   • Normocytic normochromic anemia 05/29/2017     Priority: Low   • Mixed hyperlipidemia 12/03/2013     Priority: Low   • Chronic use of opiate drug for therapeutic purpose 08/19/2017   • NSAID-induced duodenal ulcer 06/26/2017   • Medical marijuana use 06/26/2017   • Obesity (BMI 30-39.9) 09/02/2016   • Family history of cerebral  aneurysm 02/09/2016   • Low back pain radiating to right leg 12/03/2013   • Vitamin D deficiency disease 12/03/2013   • Personal history of alcoholism (HCC) 12/03/2013   • History of chronic pancreatitis 12/03/2013       Past Surgical History:  Past Surgical History:   Procedure Laterality Date   • LAMINOTOMY  6/6/2018    Procedure: LAMINOTOMY/T8;  Surgeon: Cammy Sam M.D.;  Location: SURGERY [facility];  Service: Neurosurgery   • SPINAL CORD STIMULATOR  6/6/2018    Procedure: SPINAL CORD STIMULATOR/ PADDLE ELECTRODE DORSAL COLOMN STIMULATOR AND PULSE GENERATOR;  Surgeon: Cammy Sam M.D.;  Location: SURGERY [facility];  Service: Neurosurgery   • GASTROSCOPY-ENDO N/A 5/30/2017    Procedure: GASTROSCOPY-ENDO;  Surgeon: Noe Paniagua M.D.;  Location: SURGERY [facility];  Service:    • LUMBAR FUSION POSTERIOR  2012    L3-S1   • LAMINOTOMY  2010    X Stop   • BONE SPUR EXCISION  2008    right ankle bone spur removal   • HAND SURGERY  2006    removal of ganglion cyst left hand   • GASTRIC BYPASS LAPAROSCOPIC  2001    Roen Y   • CELIAC PLEXUS NEUROLYSIS      intermittent when necessary chronic pancreatic pains       Allergies:  Pcn [penicillins]    Social History:  Social History     Socioeconomic History   • Marital status:      Spouse name: Not on file   • Number of children: Not on file   • Years of education: Not on file   • Highest education level: Not on file   Occupational History   • Not on file   Social Needs   • Financial resource strain: Not on file   • Food insecurity     Worry: Not on file     Inability: Not on file   • Transportation needs     Medical: Not on file     Non-medical: Not on file   Tobacco Use   • Smoking status: Never Smoker   • Smokeless tobacco: Never Used   Substance and Sexual Activity   • Alcohol use: No     Comment: Past history of alcoholism--last drink was 2010--attends Recovery Meetings at his [employer]   • Drug use: No   • Sexual activity: Yes      Partners: Female     Birth control/protection: Post-Menopausal   Lifestyle   • Physical activity     Days per week: Not on file     Minutes per session: Not on file   • Stress: Not on file   Relationships   • Social connections     Talks on phone: Not on file     Gets together: Not on file     Attends Buddhism service: Not on file     Active member of club or organization: Not on file     Attends meetings of clubs or organizations: Not on file     Relationship status: Not on file   • Intimate partner violence     Fear of current or ex partner: Not on file     Emotionally abused: Not on file     Physically abused: Not on file     Forced sexual activity: Not on file   Other Topics Concern   • Not on file   Social History Narrative   • Not on file       Family History:  Family History   Problem Relation Age of Onset   • Alcohol/Drug Mother    • Alcohol/Drug Father    • Stroke Father    • Stroke Sister    • GI Disease Sister         cirrhosis   • Heart Disease Sister        Medications:    Current Outpatient Medications:   •  pioglitazone (ACTOS) 15 MG Tab, Take 1 Tab by mouth every day., Disp: 90 Tab, Rfl: 2  •  Insulin Degludec (TRESIBA FLEXTOUCH) 200 UNIT/ML Solution Pen-injector, Inject 20-60 Units as instructed every bedtime., Disp: 3 PEN, Rfl: 2  •  Empagliflozin-metFORMIN HCl ER (SYNJARDY XR) 12.5-1000 MG TABLET SR 24 HR, Take 1 tablet by mouth 2 Times a Day., Disp: 60 Tab, Rfl: 6  •  Dulaglutide (TRULICITY) 1.5 MG/0.5ML Solution Pen-injector, Inject 0.5 mL as instructed every 7 days., Disp: 4 PEN, Rfl: 11  •  clopidogrel (PLAVIX) 75 MG Tab, TAKE 1 TABLET BY MOUTH EVERY DAY, Disp: 90 Tab, Rfl: 1  •  tadalafil (CIALIS) 10 MG tablet, TAKE ONE TABLET BY MOUTH ONCE DAILY as needed for erectile dysfunction, Disp: 10 Tab, Rfl: 2  •  pravastatin (PRAVACHOL) 40 MG tablet, TAKE 1 TABLET BY MOUTH EVERY DAY, Disp: 90 Tab, Rfl: 3  •  Insulin Pen Needle 32 G x 4 mm (NOVOFINE PLUS), 1 Applicator by Does not apply route every  "day. Using one needle tip with tresiba per day, Disp: 100 Each, Rfl: 3  •  Multiple Vitamin (MULTI-VITAMIN DAILY) Tab, Take  by mouth., Disp: , Rfl:   •  ondansetron (ZOFRAN) 4 MG Tab tablet, Take 1 Tab by mouth every four hours as needed for Nausea/Vomiting., Disp: 20 Tab, Rfl: 1  •  acetaminophen (TYLENOL) 500 MG Tab, Take 1,000 mg by mouth 1 time daily as needed., Disp: , Rfl:   •  Cholecalciferol (VITAMIN D3) 2000 UNIT Cap, Take 1 Cap by mouth every day., Disp: , Rfl:   •  loratadine (CLARITIN) 10 MG Tab, Take 10 mg by mouth every day., Disp: , Rfl:   •  Cyanocobalamin (VITAMIN B-12 PO), Take 1 Tab by mouth every day., Disp: , Rfl:       Physical Examination:   Vital signs: /86   Pulse 98   Ht 1.88 m (6' 2\")   Wt 89.8 kg (198 lb)   SpO2 99%   BMI 25.42 kg/m²   General: No distress, cooperative, well dressed and well nourished.   Resp: Normal effort, Bilateral clear to auscultation, No wheezing, No rales  CVS: Regular rate and rhythm, S1 S2 normal, No murmur.       Assessment and Plan:    1. Type 2 diabetes mellitus without complication, with long-term current use of insulin (HCC)       Now on:  1.  Synjardy 12.5/1000 one in the AM one in the PM  3.  Actos 15mg one a day  4.  Tresiba 14 units at night before bed  5.  Trulicity 1.5 once a week    Return in about 6 months (around 9/6/2020).    Thank you kindly for allowing me to participate in the diabetes care plan for this patient.    Ruslan Zamora PA-C, BC-ADM  Board Certified - Advanced Diabetes Management  03/06/20    CC:   Altaf Swenson D.O.    "

## 2020-03-06 NOTE — PATIENT INSTRUCTIONS
Now on:  1.  Synjardy 12.5/1000 one in the AM one in the PM  3.  Actos 15mg one a day  4.  Tresiba 14 units at night before bed  5.  Trulicity 1.5 once a week

## 2020-03-22 ENCOUNTER — HOSPITAL ENCOUNTER (OUTPATIENT)
Dept: RADIOLOGY | Facility: MEDICAL CENTER | Age: 60
End: 2020-03-22
Attending: INTERNAL MEDICINE
Payer: COMMERCIAL

## 2020-03-22 ENCOUNTER — OFFICE VISIT (OUTPATIENT)
Dept: URGENT CARE | Facility: PHYSICIAN GROUP | Age: 60
End: 2020-03-22
Payer: COMMERCIAL

## 2020-03-22 VITALS
HEART RATE: 80 BPM | SYSTOLIC BLOOD PRESSURE: 138 MMHG | WEIGHT: 190 LBS | OXYGEN SATURATION: 96 % | TEMPERATURE: 98.5 F | RESPIRATION RATE: 18 BRPM | HEIGHT: 74 IN | DIASTOLIC BLOOD PRESSURE: 94 MMHG | BODY MASS INDEX: 24.38 KG/M2

## 2020-03-22 DIAGNOSIS — S69.91XA INJURY OF RIGHT WRIST, INITIAL ENCOUNTER: ICD-10-CM

## 2020-03-22 DIAGNOSIS — T07.XXXA MULTIPLE ABRASIONS: ICD-10-CM

## 2020-03-22 DIAGNOSIS — S60.211A CONTUSION OF RIGHT WRIST, INITIAL ENCOUNTER: ICD-10-CM

## 2020-03-22 PROCEDURE — 73110 X-RAY EXAM OF WRIST: CPT | Mod: RT

## 2020-03-22 PROCEDURE — 99213 OFFICE O/P EST LOW 20 MIN: CPT | Mod: 25 | Performed by: INTERNAL MEDICINE

## 2020-03-22 PROCEDURE — 90471 IMMUNIZATION ADMIN: CPT | Performed by: INTERNAL MEDICINE

## 2020-03-22 PROCEDURE — 90715 TDAP VACCINE 7 YRS/> IM: CPT | Performed by: INTERNAL MEDICINE

## 2020-03-22 ASSESSMENT — FIBROSIS 4 INDEX: FIB4 SCORE: 1.84

## 2020-03-22 ASSESSMENT — ENCOUNTER SYMPTOMS
TINGLING: 0
NUMBNESS: 0

## 2020-03-22 NOTE — PROGRESS NOTES
Subjective:     Gil Hart is a 59 y.o. male who presents for Wrist Injury (R wrist injury)  Patient says while hiking new tripped and fell onto his right wrist he played some guitar and the pain got worse     Wrist Injury    The incident occurred 12 to 24 hours ago. The incident occurred in the street. The injury mechanism was a fall. The pain is present in the right wrist. The pain is moderate. The pain has been worsening since the incident. Pertinent negatives include no numbness or tingling.     Past Medical History:   Diagnosis Date   • Cancer (HCC)     CLL--sees Dr. Moreira   • Chronic use of opiate drugs therapeutic purposes 8/19/2017   • Diabetes     oral medication    • Hemorrhagic disorder (HCC)     on Plavix    • Hypertension    • NSAID-induced duodenal ulcer 6/26/2017   • Obesity (BMI 30-39.9) 9/2/2016   • Pain     chronic back pain    • Stroke (HCC) 04/2016    no residual problems      Past Surgical History:   Procedure Laterality Date   • LAMINOTOMY  6/6/2018    Procedure: LAMINOTOMY/T8;  Surgeon: Cammy Sam M.D.;  Location: [facility];  Service: Neurosurgery   • SPINAL CORD STIMULATOR  6/6/2018    Procedure: SPINAL CORD STIMULATOR/ PADDLE ELECTRODE DORSAL COLOMN STIMULATOR AND PULSE GENERATOR;  Surgeon: Cammy Sam M.D.;  Location: [facility];  Service: Neurosurgery   • GASTROSCOPY-ENDO N/A 5/30/2017    Procedure: GASTROSCOPY-ENDO;  Surgeon: Noe Paniagua M.D.;  Location: [facility];  Service:    • LUMBAR FUSION POSTERIOR  2012    L3-S1   • LAMINOTOMY  2010    X Stop   • BONE SPUR EXCISION  2008    right ankle bone spur removal   • HAND SURGERY  2006    removal of ganglion cyst left hand   • GASTRIC BYPASS LAPAROSCOPIC  2001    [facility] Y   • CELIAC PLEXUS NEUROLYSIS      intermittent when necessary chronic pancreatic pains     Social History     Socioeconomic History   • Marital status:      Spouse name: Not on file   • Number of  children: Not on file   • Years of education: Not on file   • Highest education level: Not on file   Occupational History   • Not on file   Social Needs   • Financial resource strain: Not on file   • Food insecurity     Worry: Not on file     Inability: Not on file   • Transportation needs     Medical: Not on file     Non-medical: Not on file   Tobacco Use   • Smoking status: Never Smoker   • Smokeless tobacco: Never Used   Substance and Sexual Activity   • Alcohol use: No     Comment: Past history of alcoholism--last drink was 2010--attends Recovery Meetings at his [employer]   • Drug use: No   • Sexual activity: Yes     Partners: Female     Birth control/protection: Post-Menopausal   Lifestyle   • Physical activity     Days per week: Not on file     Minutes per session: Not on file   • Stress: Not on file   Relationships   • Social connections     Talks on phone: Not on file     Gets together: Not on file     Attends Catholic service: Not on file     Active member of club or organization: Not on file     Attends meetings of clubs or organizations: Not on file     Relationship status: Not on file   • Intimate partner violence     Fear of current or ex partner: Not on file     Emotionally abused: Not on file     Physically abused: Not on file     Forced sexual activity: Not on file   Other Topics Concern   • Not on file   Social History Narrative   • Not on file      Family History   Problem Relation Age of Onset   • Alcohol/Drug Mother    • Alcohol/Drug Father    • Stroke Father    • Stroke Sister    • GI Disease Sister         cirrhosis   • Heart Disease Sister     Review of Systems   Neurological: Negative for tingling and numbness.   All other systems reviewed and are negative.    Allergies   Allergen Reactions   • Pcn [Penicillins] Unspecified     RXN=Childhood allergy       Objective:   /94 (BP Location: Left arm, Patient Position: Sitting, BP Cuff Size: Small adult)   Pulse 80   Temp 36.9 °C (98.5 °F)  "(Temporal)   Resp 18   Ht 1.88 m (6' 2\")   Wt 86.2 kg (190 lb)   SpO2 96%   BMI 24.39 kg/m²   Physical Exam  Vitals signs reviewed.   Constitutional:       General: He is not in acute distress.     Appearance: He is well-developed.   HENT:      Head: Normocephalic and atraumatic.   Eyes:      Conjunctiva/sclera: Conjunctivae normal.   Cardiovascular:      Rate and Rhythm: Normal rate and regular rhythm.   Pulmonary:      Effort: Pulmonary effort is normal.      Breath sounds: Normal breath sounds.   Musculoskeletal:         General: Swelling, tenderness and signs of injury present.      Comments: Right wrist-swelling and tenderness present mostly over the lateral aspect and the dorsal part,  Abrasions of the right hand present    Range of motion limited by swelling and pain  No distal neurovascular abnormality   Skin:     General: Skin is warm and dry.   Neurological:      Mental Status: He is alert and oriented to person, place, and time.   Psychiatric:         Thought Content: Thought content normal.           Assessment/Plan:   Assessment    1. Injury of right wrist, initial encounter  - DX-WRIST-COMPLETE 3+ RIGHT; Future  - Tdap =>5yo IM    2. Contusion of right wrist, initial encounter    3. Multiple abrasions    X-ray no acute abnormality, arthrosclerosis present    Advised patient to use soft brace, ice elevation and Tylenol 1 g 3 times a day as needed      Differential diagnosis, natural history, supportive care, and indications for immediate follow-up discussed.  "

## 2020-03-25 ENCOUNTER — OFFICE VISIT (OUTPATIENT)
Dept: MEDICAL GROUP | Facility: LAB | Age: 60
End: 2020-03-25
Payer: COMMERCIAL

## 2020-03-25 DIAGNOSIS — Z79.4 TYPE 2 DIABETES MELLITUS WITHOUT COMPLICATION, WITH LONG-TERM CURRENT USE OF INSULIN (HCC): ICD-10-CM

## 2020-03-25 DIAGNOSIS — E11.9 TYPE 2 DIABETES MELLITUS WITHOUT COMPLICATION, WITH LONG-TERM CURRENT USE OF INSULIN (HCC): ICD-10-CM

## 2020-03-25 DIAGNOSIS — C91.10 CLL (CHRONIC LYMPHOCYTIC LEUKEMIA) (HCC): ICD-10-CM

## 2020-03-25 DIAGNOSIS — I63.30 CEREBROVASCULAR ACCIDENT (CVA) DUE TO THROMBOSIS OF CEREBRAL ARTERY (HCC): ICD-10-CM

## 2020-03-25 PROCEDURE — 99212 OFFICE O/P EST SF 10 MIN: CPT | Mod: 95,CR | Performed by: INTERNAL MEDICINE

## 2020-03-26 NOTE — PROGRESS NOTES
Telephone Appointment Visit   As a means of avoiding spread of COVID-19, this visit is being conducted by telephone. This telephone visit was initiated by the patient and they verbally consented.    Time at start of call: 2:59    Reason for Call:  Symptom Follow-up    Patient Comments / History:   Robert and DORI have discussed that he has a history of dyslipidemia, is responding well to the use of Pravachol, is requesting follow-up with cardiology.  Additionally would like to continue to follow-up with Ruslan at Dr. Jacklyn Ramirez's office.  Referrals written    Labs / Images Reviewed   3/6/2020 hemoglobin A1c, old lipid profile    Assessment and Plan:     1. Type 2 diabetes mellitus without complication, with long-term current use of insulin (HCC)  - REFERRAL TO CARDIOLOGY  - REFERRAL TO ENDOCRINOLOGY    2. CLL (chronic lymphocytic leukemia) (HCC)  - REFERRAL TO CARDIOLOGY    3. Cerebrovascular accident (CVA) due to thrombosis of cerebral artery (HCC)  - REFERRAL TO CARDIOLOGY      Follow-up: No follow-ups on file.    Time at end of call: 3:08  Total Time Spent: 5-10 minutes    Altaf Swenson D.O.

## 2020-04-08 ENCOUNTER — OFFICE VISIT (OUTPATIENT)
Dept: CARDIOLOGY | Facility: MEDICAL CENTER | Age: 60
End: 2020-04-08
Payer: COMMERCIAL

## 2020-04-08 VITALS
DIASTOLIC BLOOD PRESSURE: 84 MMHG | HEART RATE: 94 BPM | OXYGEN SATURATION: 96 % | SYSTOLIC BLOOD PRESSURE: 142 MMHG | BODY MASS INDEX: 24.38 KG/M2 | HEIGHT: 74 IN | WEIGHT: 190 LBS

## 2020-04-08 DIAGNOSIS — E78.2 MIXED HYPERLIPIDEMIA: ICD-10-CM

## 2020-04-08 DIAGNOSIS — I10 ESSENTIAL HYPERTENSION: ICD-10-CM

## 2020-04-08 DIAGNOSIS — I63.30 CEREBROVASCULAR ACCIDENT (CVA) DUE TO THROMBOSIS OF CEREBRAL ARTERY (HCC): ICD-10-CM

## 2020-04-08 LAB — EKG IMPRESSION: NORMAL

## 2020-04-08 PROCEDURE — 93000 ELECTROCARDIOGRAM COMPLETE: CPT | Performed by: INTERNAL MEDICINE

## 2020-04-08 PROCEDURE — 99204 OFFICE O/P NEW MOD 45 MIN: CPT | Performed by: INTERNAL MEDICINE

## 2020-04-08 RX ORDER — ATORVASTATIN CALCIUM 40 MG/1
40 TABLET, FILM COATED ORAL DAILY
Qty: 90 TAB | Refills: 3 | Status: SHIPPED | OUTPATIENT
Start: 2020-04-08 | End: 2020-08-06 | Stop reason: SINTOL

## 2020-04-08 ASSESSMENT — FIBROSIS 4 INDEX: FIB4 SCORE: 1.84

## 2020-04-08 NOTE — PROGRESS NOTES
CARDIOLOGY NEW PATIENT CONSULTATION    PCP: Altaf Swenson D.O.    1. Cerebrovascular accident (CVA) due to thrombosis of cerebral artery (HCC)  In 2016.  No recurrence.  Continue statin and Plavix.  He never underwent a 30-day monitor to screen for A. fib and this was arranged.  Furthermore, I discussed the potential utility of using an apple watch with arrhythmia detection to provide a means of background surveillance.    2. Essential hypertension  Elevated today.  I recommended close attention during home monitoring to ensure an average level less than 130/80    3. Mixed hyperlipidemia  Well-controlled however given the secondary prevention indication for therapy I recommended high intensity statin and converted pravastatin to atorvastatin 40 mg.  He will repeat a lipid profile and comprehensive metabolic panel in 3 months time.  If the LDL is not below 50 I will increase to 80 mg daily.      Follow up with myself in 1 year    Chief Complaint   Patient presents with   • New Patient       History: Gil Hart is a 59 y.o. male with a past medical history of hypertension, diabetes, CVA and gastric bypass presenting for consultation regarding cardiovascular risk.  He has been lost to follow-up since the CVA in 2016.  Fortunately he is doing well from this without any major long-term sequelae.  Vascular calcifications have been brought to his attention on several occasions during radiographic imaging of the wrist as well as the neck.  He has undergone a coronary calcium score in the past with readings below 100.  Furthermore he had a carotid duplex in the fall which showed only nonobstructive disease.  Since that CVA he has been taking pravastatin 40 mg as well as Plavix and queries the appropriateness of ongoing therapy.  He measures his blood pressure weekly and average readings at home are 130/80.  Walks his dogs 1 mile daily and feels no cardiovascular symptoms like angina or undue  "dyspnea.    ROS:  All other systems reviewed and negative except as per the HPI    PE:  /84 (BP Location: Right arm, Patient Position: Sitting, BP Cuff Size: Adult)   Pulse 94   Ht 1.88 m (6' 2\")   Wt 86.2 kg (190 lb)   SpO2 96%   BMI 24.39 kg/m²   GEN: Well appearing  HEENT: Symmetric face. Anicteric sclerae. Moist mucus membranes  NECK: No JVD. No lymphadenopathy  CARDIAC: Normal PMI, regular, normal S1, S2.  VASCULATURE: Normal carotid amplitude without bruit.   RESP: Clear to auscultation bilaterally  ABD: Soft, non-tender, non-distended  EXT: No edema, no clubbing or cyanosis  SKIN: Warm and dry  NEURO: No gross deficits  PSYCH: Appropriate affect, participates in conversation    Past Medical History:   Diagnosis Date   • Cancer (HCC)     CLL--sees Dr. Moreira   • Chronic use of opiate drugs therapeutic purposes 8/19/2017   • Diabetes     oral medication    • Hemorrhagic disorder (HCC)     on Plavix    • Hypertension    • NSAID-induced duodenal ulcer 6/26/2017   • Obesity (BMI 30-39.9) 9/2/2016   • Pain     chronic back pain    • Stroke (HCC) 04/2016    no residual problems      Past Surgical History:   Procedure Laterality Date   • LAMINOTOMY  6/6/2018    Procedure: LAMINOTOMY/T8;  Surgeon: Cammy Sam M.D.;  Location: [facility];  Service: Neurosurgery   • SPINAL CORD STIMULATOR  6/6/2018    Procedure: SPINAL CORD STIMULATOR/ PADDLE ELECTRODE DORSAL COLOMN STIMULATOR AND PULSE GENERATOR;  Surgeon: Cammy Sam M.D.;  Location: [facility];  Service: Neurosurgery   • GASTROSCOPY-ENDO N/A 5/30/2017    Procedure: GASTROSCOPY-ENDO;  Surgeon: Noe Paniagua M.D.;  Location: [facility];  Service:    • LUMBAR FUSION POSTERIOR  2012    L3-S1   • LAMINOTOMY  2010    X Stop   • BONE SPUR EXCISION  2008    right ankle bone spur removal   • HAND SURGERY  2006    removal of ganglion cyst left hand   • GASTRIC BYPASS LAPAROSCOPIC  2001    Roen Y   • CELIAC " PLEXUS NEUROLYSIS      intermittent when necessary chronic pancreatic pains     Allergies   Allergen Reactions   • Pcn [Penicillins] Unspecified     RXN=Childhood allergy      Outpatient Encounter Medications as of 4/8/2020   Medication Sig Dispense Refill   • atorvastatin (LIPITOR) 40 MG Tab Take 1 Tab by mouth every day. 90 Tab 3   • pioglitazone (ACTOS) 15 MG Tab Take 1 Tab by mouth every day. 90 Tab 2   • Insulin Degludec (TRESIBA FLEXTOUCH) 200 UNIT/ML Solution Pen-injector Inject 20-60 Units as instructed every bedtime. 3 PEN 2   • Empagliflozin-metFORMIN HCl ER (SYNJARDY XR) 12.5-1000 MG TABLET SR 24 HR Take 1 tablet by mouth 2 Times a Day. 60 Tab 6   • Dulaglutide (TRULICITY) 1.5 MG/0.5ML Solution Pen-injector Inject 0.5 mL as instructed every 7 days. 4 PEN 11   • clopidogrel (PLAVIX) 75 MG Tab TAKE 1 TABLET BY MOUTH EVERY DAY 90 Tab 1   • tadalafil (CIALIS) 10 MG tablet TAKE ONE TABLET BY MOUTH ONCE DAILY as needed for erectile dysfunction 10 Tab 2   • Multiple Vitamin (MULTI-VITAMIN DAILY) Tab Take  by mouth.     • ondansetron (ZOFRAN) 4 MG Tab tablet Take 1 Tab by mouth every four hours as needed for Nausea/Vomiting. 20 Tab 1   • acetaminophen (TYLENOL) 500 MG Tab Take 1,000 mg by mouth 1 time daily as needed.     • Cholecalciferol (VITAMIN D3) 2000 UNIT Cap Take 1 Cap by mouth every day.     • loratadine (CLARITIN) 10 MG Tab Take 10 mg by mouth every day.     • Cyanocobalamin (VITAMIN B-12 PO) Take 1 Tab by mouth every day.     • [DISCONTINUED] pravastatin (PRAVACHOL) 40 MG tablet TAKE 1 TABLET BY MOUTH EVERY DAY 90 Tab 3   • [DISCONTINUED] Insulin Pen Needle 32 G x 4 mm (NOVOFINE PLUS) 1 Applicator by Does not apply route every day. Using one needle tip with tresiba per day (Patient not taking: Reported on 3/22/2020) 100 Each 3     No facility-administered encounter medications on file as of 4/8/2020.      Social History     Socioeconomic History   • Marital status:      Spouse name: Not on file    • Number of children: Not on file   • Years of education: Not on file   • Highest education level: Not on file   Occupational History   • Not on file   Social Needs   • Financial resource strain: Not on file   • Food insecurity     Worry: Not on file     Inability: Not on file   • Transportation needs     Medical: Not on file     Non-medical: Not on file   Tobacco Use   • Smoking status: Never Smoker   • Smokeless tobacco: Never Used   Substance and Sexual Activity   • Alcohol use: No     Comment: Past history of alcoholism--last drink was 2010--attends Recovery Meetings at his [employer]   • Drug use: No   • Sexual activity: Yes     Partners: Female     Birth control/protection: Post-Menopausal   Lifestyle   • Physical activity     Days per week: Not on file     Minutes per session: Not on file   • Stress: Not on file   Relationships   • Social connections     Talks on phone: Not on file     Gets together: Not on file     Attends Religion service: Not on file     Active member of club or organization: Not on file     Attends meetings of clubs or organizations: Not on file     Relationship status: Not on file   • Intimate partner violence     Fear of current or ex partner: Not on file     Emotionally abused: Not on file     Physically abused: Not on file     Forced sexual activity: Not on file   Other Topics Concern   • Not on file   Social History Narrative   • Not on file         Family History   Problem Relation Age of Onset   • Alcohol/Drug Mother    • Alcohol/Drug Father    • Stroke Father    • Stroke Sister    • GI Disease Sister         cirrhosis   • Heart Disease Sister          Studies  Lab Results   Component Value Date/Time    CHOLSTRLTOT 129 07/18/2019 06:14 AM    LDL 66 07/18/2019 06:14 AM    HDL 36 (A) 07/18/2019 06:14 AM    TRIGLYCERIDE 133 07/18/2019 06:14 AM       Lab Results   Component Value Date/Time    SODIUM 137 07/18/2019 06:14 AM    POTASSIUM 3.9 07/18/2019 06:14 AM    CHLORIDE 99 07/18/2019  06:14 AM    CO2 29 07/18/2019 06:14 AM    GLUCOSE 235 (H) 07/18/2019 06:14 AM    BUN 15 07/18/2019 06:14 AM    CREATININE 1.37 07/18/2019 06:14 AM     Lab Results   Component Value Date/Time    ALKPHOSPHAT 78 07/18/2019 06:14 AM    ASTSGOT 17 07/18/2019 06:14 AM    ALTSGPT 14 07/18/2019 06:14 AM    TBILIRUBIN 0.5 07/18/2019 06:14 AM        For this encounter I directly reviewed ECG tracings and medical records I agree with the interpretations in the electronic health record

## 2020-05-18 ENCOUNTER — PATIENT MESSAGE (OUTPATIENT)
Dept: CARDIOLOGY | Facility: MEDICAL CENTER | Age: 60
End: 2020-05-18

## 2020-05-18 NOTE — PATIENT COMMUNICATION
Evangelista Rivera M.D.  You 50 minutes ago (12:32 PM)       I suggest trying two weeks without the medication to see if side effects resolve- these are atypical and may not be related to the statin. If they go away we can try rosuvastatin 20 mg daily instead

## 2020-06-01 ENCOUNTER — TELEPHONE (OUTPATIENT)
Dept: CARDIOLOGY | Facility: MEDICAL CENTER | Age: 60
End: 2020-06-01

## 2020-06-01 ENCOUNTER — NON-PROVIDER VISIT (OUTPATIENT)
Dept: CARDIOLOGY | Facility: MEDICAL CENTER | Age: 60
End: 2020-06-01
Payer: COMMERCIAL

## 2020-06-01 DIAGNOSIS — I63.9 CEREBROVASCULAR ACCIDENT (CVA), UNSPECIFIED MECHANISM (HCC): ICD-10-CM

## 2020-06-01 DIAGNOSIS — I63.30 CEREBROVASCULAR ACCIDENT (CVA) DUE TO THROMBOSIS OF CEREBRAL ARTERY (HCC): ICD-10-CM

## 2020-06-01 PROCEDURE — 93268 ECG RECORD/REVIEW: CPT | Performed by: INTERNAL MEDICINE

## 2020-06-01 NOTE — TELEPHONE ENCOUNTER
Patient enrolled in the 30 day [employer] [facility] Heart monitoring program per .  >In office hookup with Baseline transmitted.  >Pending EOS.

## 2020-06-02 ENCOUNTER — APPOINTMENT (RX ONLY)
Dept: URBAN - METROPOLITAN AREA CLINIC 4 | Facility: CLINIC | Age: 60
Setting detail: DERMATOLOGY
End: 2020-06-02

## 2020-06-02 DIAGNOSIS — L82.1 OTHER SEBORRHEIC KERATOSIS: ICD-10-CM

## 2020-06-02 DIAGNOSIS — E13.620 OTHER SPECIFIED DIABETES MELLITUS WITH DIABETIC DERMATITIS: ICD-10-CM

## 2020-06-02 DIAGNOSIS — L57.0 ACTINIC KERATOSIS: ICD-10-CM

## 2020-06-02 DIAGNOSIS — Z71.89 OTHER SPECIFIED COUNSELING: ICD-10-CM

## 2020-06-02 DIAGNOSIS — L82.0 INFLAMED SEBORRHEIC KERATOSIS: ICD-10-CM

## 2020-06-02 DIAGNOSIS — L81.4 OTHER MELANIN HYPERPIGMENTATION: ICD-10-CM

## 2020-06-02 PROCEDURE — 99214 OFFICE O/P EST MOD 30 MIN: CPT | Mod: 25

## 2020-06-02 PROCEDURE — ? LIQUID NITROGEN

## 2020-06-02 PROCEDURE — ? COUNSELING

## 2020-06-02 PROCEDURE — 17110 DESTRUCTION B9 LES UP TO 14: CPT

## 2020-06-02 PROCEDURE — 17000 DESTRUCT PREMALG LESION: CPT | Mod: 59

## 2020-06-02 PROCEDURE — 17003 DESTRUCT PREMALG LES 2-14: CPT | Mod: 59

## 2020-06-02 ASSESSMENT — LOCATION DETAILED DESCRIPTION DERM
LOCATION DETAILED: LEFT FOREHEAD
LOCATION DETAILED: RIGHT VENTRAL PROXIMAL FOREARM
LOCATION DETAILED: INFERIOR MID FOREHEAD
LOCATION DETAILED: LEFT ANTIHELIX
LOCATION DETAILED: LEFT SUPERIOR CRUS OF ANTIHELIX
LOCATION DETAILED: INFERIOR THORACIC SPINE
LOCATION DETAILED: RIGHT SUPERIOR PREAURICULAR CHEEK
LOCATION DETAILED: RIGHT DISTAL RADIAL DORSAL FOREARM
LOCATION DETAILED: LEFT SUPERIOR HELIX
LOCATION DETAILED: RIGHT SUPERIOR HELIX
LOCATION DETAILED: LEFT DISTAL DORSAL FOREARM
LOCATION DETAILED: RIGHT DORSAL FOOT
LOCATION DETAILED: LOWER STERNUM
LOCATION DETAILED: LEFT VENTRAL PROXIMAL FOREARM
LOCATION DETAILED: RIGHT INFERIOR FOREHEAD

## 2020-06-02 ASSESSMENT — LOCATION SIMPLE DESCRIPTION DERM
LOCATION SIMPLE: RIGHT FOOT
LOCATION SIMPLE: LEFT EAR
LOCATION SIMPLE: CHEST
LOCATION SIMPLE: RIGHT CHEEK
LOCATION SIMPLE: INFERIOR FOREHEAD
LOCATION SIMPLE: UPPER BACK
LOCATION SIMPLE: LEFT FOREHEAD
LOCATION SIMPLE: RIGHT FOREARM
LOCATION SIMPLE: RIGHT EAR
LOCATION SIMPLE: LEFT FOREARM
LOCATION SIMPLE: RIGHT FOREHEAD

## 2020-06-02 ASSESSMENT — LOCATION ZONE DERM
LOCATION ZONE: FACE
LOCATION ZONE: EAR
LOCATION ZONE: ARM
LOCATION ZONE: FEET
LOCATION ZONE: TRUNK

## 2020-06-02 NOTE — HPI: SKIN LESIONS
Is This A New Presentation, Or A Follow-Up?: Skin Lesions
How Severe Is Your Skin Lesion?: mild
Have Your Skin Lesions Been Treated?: not been treated
Additional History: Spot on left side of face above temple has been treated with LN2 in 2014

## 2020-06-02 NOTE — PROCEDURE: LIQUID NITROGEN
Number Of Freeze-Thaw Cycles: 1 freeze-thaw cycle
Render Note In Bullet Format When Appropriate: No
Duration Of Freeze Thaw-Cycle (Seconds): 2
Detail Level: Detailed
Post-Care Instructions: I reviewed with the patient in detail post-care instructions. Patient is to wear sunprotection, and avoid picking at any of the treated lesions. Pt may apply Vaseline to crusted or scabbing areas.
Consent: The patient's consent was obtained including but not limited to risks of crusting, scabbing, blistering, scarring, darker or lighter pigmentary change, recurrence, incomplete removal and infection.
Medical Necessity Information: It is in your best interest to select a reason for this procedure from the list below. All of these items fulfill various CMS LCD requirements except the new and changing color options.
Medical Necessity Clause: This procedure was medically necessary because the lesions that were treated were:

## 2020-07-30 ENCOUNTER — PATIENT MESSAGE (OUTPATIENT)
Dept: CARDIOLOGY | Facility: MEDICAL CENTER | Age: 60
End: 2020-07-30

## 2020-08-06 RX ORDER — ROSUVASTATIN CALCIUM 20 MG/1
20 TABLET, COATED ORAL EVERY EVENING
Qty: 90 TAB | Refills: 2 | Status: SHIPPED | OUTPATIENT
Start: 2020-08-06 | End: 2021-04-30

## 2020-08-06 NOTE — PATIENT COMMUNICATION
[facility]     Evangelista Rivera M.D.  You 50 minutes ago (12:32 PM)       I suggest trying two weeks without the medication to see if side effects resolve- these are atypical and may not be related to the statin. If they go away we can try rosuvastatin 20 mg daily instead

## 2020-08-17 RX ORDER — CLOPIDOGREL BISULFATE 75 MG/1
TABLET ORAL
Qty: 90 TAB | Refills: 3 | Status: SHIPPED | OUTPATIENT
Start: 2020-08-17 | End: 2021-08-24

## 2020-08-17 NOTE — TELEPHONE ENCOUNTER
Received request via: Patient    Was the patient seen in the last year in this department? Yes  4/8/2020  Does the patient have an active prescription (recently filled or refills available) for medication(s) requested? No

## 2020-09-09 DIAGNOSIS — N52.9 ERECTILE DYSFUNCTION, UNSPECIFIED ERECTILE DYSFUNCTION TYPE: ICD-10-CM

## 2020-09-09 RX ORDER — TADALAFIL 10 MG/1
TABLET ORAL
Qty: 10 TAB | Refills: 0 | Status: SHIPPED | OUTPATIENT
Start: 2020-09-09 | End: 2020-11-30

## 2020-09-09 NOTE — TELEPHONE ENCOUNTER
Received request via: Pharmacy    Was the patient seen in the last year in this department? Yes  3/5/20  Does the patient have an active prescription (recently filled or refills available) for medication(s) requested? No

## 2020-09-21 ENCOUNTER — HOSPITAL ENCOUNTER (OUTPATIENT)
Dept: LAB | Facility: MEDICAL CENTER | Age: 60
End: 2020-09-21
Attending: PHYSICIAN ASSISTANT
Payer: COMMERCIAL

## 2020-09-21 LAB
ALBUMIN SERPL BCP-MCNC: 4.3 G/DL (ref 3.2–4.9)
ALBUMIN/GLOB SERPL: 1.8 G/DL
ALP SERPL-CCNC: 96 U/L (ref 30–99)
ALT SERPL-CCNC: 22 U/L (ref 2–50)
ANION GAP SERPL CALC-SCNC: 12 MMOL/L (ref 7–16)
ANISOCYTOSIS BLD QL SMEAR: ABNORMAL
AST SERPL-CCNC: 18 U/L (ref 12–45)
BASOPHILS # BLD AUTO: 0 % (ref 0–1.8)
BASOPHILS # BLD: 0 K/UL (ref 0–0.12)
BILIRUB SERPL-MCNC: 0.4 MG/DL (ref 0.1–1.5)
BUN SERPL-MCNC: 14 MG/DL (ref 8–22)
CALCIUM SERPL-MCNC: 8.8 MG/DL (ref 8.5–10.5)
CHLORIDE SERPL-SCNC: 104 MMOL/L (ref 96–112)
CHOLEST SERPL-MCNC: 82 MG/DL (ref 100–199)
CO2 SERPL-SCNC: 27 MMOL/L (ref 20–33)
CREAT SERPL-MCNC: 1 MG/DL (ref 0.5–1.4)
CREAT UR-MCNC: 101.11 MG/DL
EOSINOPHIL # BLD AUTO: 0 K/UL (ref 0–0.51)
EOSINOPHIL NFR BLD: 0 % (ref 0–6.9)
ERYTHROCYTE [DISTWIDTH] IN BLOOD BY AUTOMATED COUNT: 46.2 FL (ref 35.9–50)
FASTING STATUS PATIENT QL REPORTED: NORMAL
GLOBULIN SER CALC-MCNC: 2.4 G/DL (ref 1.9–3.5)
GLUCOSE SERPL-MCNC: 121 MG/DL (ref 65–99)
HCT VFR BLD AUTO: 43.4 % (ref 42–52)
HDLC SERPL-MCNC: 43 MG/DL
HGB BLD-MCNC: 13.7 G/DL (ref 14–18)
LDLC SERPL CALC-MCNC: 28 MG/DL
LYMPHOCYTES # BLD AUTO: 11.07 K/UL (ref 1–4.8)
LYMPHOCYTES NFR BLD: 82.6 % (ref 22–41)
MACROCYTES BLD QL SMEAR: ABNORMAL
MANUAL DIFF BLD: NORMAL
MCH RBC QN AUTO: 28.2 PG (ref 27–33)
MCHC RBC AUTO-ENTMCNC: 31.6 G/DL (ref 33.7–35.3)
MCV RBC AUTO: 89.3 FL (ref 81.4–97.8)
METAMYELOCYTES NFR BLD MANUAL: 0.9 %
MICROALBUMIN UR-MCNC: 2.7 MG/DL
MICROALBUMIN/CREAT UR: 27 MG/G (ref 0–30)
MONOCYTES # BLD AUTO: 0 K/UL (ref 0–0.85)
MONOCYTES NFR BLD AUTO: 0 % (ref 0–13.4)
MORPHOLOGY BLD-IMP: NORMAL
NEUTROPHILS # BLD AUTO: 2.21 K/UL (ref 1.82–7.42)
NEUTROPHILS NFR BLD: 16.5 % (ref 44–72)
NRBC # BLD AUTO: 0 K/UL
NRBC BLD-RTO: 0 /100 WBC
OVALOCYTES BLD QL SMEAR: NORMAL
PLATELET # BLD AUTO: 193 K/UL (ref 164–446)
PLATELET BLD QL SMEAR: NORMAL
PMV BLD AUTO: 10.6 FL (ref 9–12.9)
POIKILOCYTOSIS BLD QL SMEAR: NORMAL
POTASSIUM SERPL-SCNC: 4 MMOL/L (ref 3.6–5.5)
PROT SERPL-MCNC: 6.7 G/DL (ref 6–8.2)
RBC # BLD AUTO: 4.86 M/UL (ref 4.7–6.1)
RBC BLD AUTO: PRESENT
SMUDGE CELLS BLD QL SMEAR: NORMAL
SODIUM SERPL-SCNC: 143 MMOL/L (ref 135–145)
T4 FREE SERPL-MCNC: 1.35 NG/DL (ref 0.93–1.7)
TRIGL SERPL-MCNC: 54 MG/DL (ref 0–149)
TSH SERPL DL<=0.005 MIU/L-ACNC: 1.24 UIU/ML (ref 0.38–5.33)
WBC # BLD AUTO: 13.4 K/UL (ref 4.8–10.8)

## 2020-09-21 PROCEDURE — 85027 COMPLETE CBC AUTOMATED: CPT

## 2020-09-21 PROCEDURE — 80061 LIPID PANEL: CPT

## 2020-09-21 PROCEDURE — 84443 ASSAY THYROID STIM HORMONE: CPT

## 2020-09-21 PROCEDURE — 80053 COMPREHEN METABOLIC PANEL: CPT

## 2020-09-21 PROCEDURE — 85007 BL SMEAR W/DIFF WBC COUNT: CPT

## 2020-09-21 PROCEDURE — 36415 COLL VENOUS BLD VENIPUNCTURE: CPT

## 2020-09-21 PROCEDURE — 84439 ASSAY OF FREE THYROXINE: CPT

## 2020-09-21 PROCEDURE — 82570 ASSAY OF URINE CREATININE: CPT

## 2020-09-21 PROCEDURE — 82043 UR ALBUMIN QUANTITATIVE: CPT

## 2020-11-28 DIAGNOSIS — N52.9 ERECTILE DYSFUNCTION, UNSPECIFIED ERECTILE DYSFUNCTION TYPE: ICD-10-CM

## 2020-11-30 RX ORDER — TADALAFIL 10 MG/1
TABLET ORAL
Qty: 10 TAB | Refills: 0 | Status: SHIPPED | OUTPATIENT
Start: 2020-11-30 | End: 2020-12-02

## 2020-12-02 DIAGNOSIS — N52.9 ERECTILE DYSFUNCTION, UNSPECIFIED ERECTILE DYSFUNCTION TYPE: ICD-10-CM

## 2020-12-02 RX ORDER — TADALAFIL 10 MG/1
TABLET ORAL
Qty: 10 TAB | Refills: 0 | Status: SHIPPED | OUTPATIENT
Start: 2020-12-02 | End: 2021-07-15

## 2020-12-02 NOTE — TELEPHONE ENCOUNTER
Received request via: Pharmacy    Was the patient seen in the last year in this department? Yes  3/25/20  Does the patient have an active prescription (recently filled or refills available) for medication(s) requested? No

## 2020-12-18 ENCOUNTER — PATIENT MESSAGE (OUTPATIENT)
Dept: MEDICAL GROUP | Facility: LAB | Age: 60
End: 2020-12-18

## 2020-12-18 DIAGNOSIS — J06.9 UPPER RESPIRATORY TRACT INFECTION, UNSPECIFIED TYPE: ICD-10-CM

## 2020-12-18 RX ORDER — FLUTICASONE PROPIONATE 44 MCG
1 AEROSOL WITH ADAPTER (GRAM) INHALATION 2 TIMES DAILY
Qty: 1 EACH | Refills: 3 | Status: SHIPPED | OUTPATIENT
Start: 2020-12-18 | End: 2021-04-23

## 2020-12-19 ENCOUNTER — PATIENT MESSAGE (OUTPATIENT)
Dept: MEDICAL GROUP | Facility: LAB | Age: 60
End: 2020-12-19

## 2020-12-22 NOTE — TELEPHONE ENCOUNTER
From: Gil Hart  To: Altaf Swenson D.O.  Sent: 12/19/2020 7:33 PM PST  Subject: Prescription Question    Thanks Dr Denis, wishing you and your family a very Merry [place] and Happy New Year!    Robert      ----- Message -----   From:Altaf Swenson D.O.   Sent:12/18/2020 5:18 PM PST   To:Gil Hart   Subject:RE: Prescription Question    Robert:  Flovent 44 sent to your [facility] in [place], I am pleased to hear that it worked well!  Regards, Altaf Swenson, DO      ----- Message -----   From:Gil Hart   Sent:12/18/2020 3:04 PM PST   To:Altaf Swenson D.O.   Subject:Prescription Question    Hello, I submitted [facility]ill [facility] [place] and they said they were going to reach out to you for approval for Flovent HFA 44MCG. This was prescribed by Urgent Care to treat my COVID-19 symptoms. I am finding it to be quite effective and would appreciate you calling [facility] into [facility] in [place], at [address] [place] [address]    Thank you

## 2021-03-15 DIAGNOSIS — Z23 NEED FOR VACCINATION: ICD-10-CM

## 2021-04-23 DIAGNOSIS — J06.9 UPPER RESPIRATORY TRACT INFECTION, UNSPECIFIED TYPE: ICD-10-CM

## 2021-04-23 RX ORDER — FLUTICASONE PROPIONATE 44 MCG
AEROSOL WITH ADAPTER (GRAM) INHALATION
Qty: 10.6 G | Refills: 0 | Status: SHIPPED | OUTPATIENT
Start: 2021-04-23 | End: 2021-04-26

## 2021-04-23 NOTE — TELEPHONE ENCOUNTER
Received request via: Pharmacy    Was the patient seen in the last year in this department? No pt informed to make FV  3/2020  Does the patient have an active prescription (recently filled or refills available) for medication(s) requested? No

## 2021-04-25 DIAGNOSIS — J06.9 UPPER RESPIRATORY TRACT INFECTION, UNSPECIFIED TYPE: ICD-10-CM

## 2021-04-26 RX ORDER — FLUTICASONE PROPIONATE 44 MCG
AEROSOL WITH ADAPTER (GRAM) INHALATION
Qty: 31.8 G | Refills: 1 | Status: SHIPPED | OUTPATIENT
Start: 2021-04-26 | End: 2021-06-09

## 2021-04-30 DIAGNOSIS — E78.2 MIXED HYPERLIPIDEMIA: ICD-10-CM

## 2021-04-30 RX ORDER — ROSUVASTATIN CALCIUM 20 MG/1
TABLET, COATED ORAL
Qty: 90 TABLET | Refills: 0 | Status: SHIPPED | OUTPATIENT
Start: 2021-04-30 | End: 2021-07-27

## 2021-06-09 ENCOUNTER — OFFICE VISIT (OUTPATIENT)
Dept: CARDIOLOGY | Facility: MEDICAL CENTER | Age: 61
End: 2021-06-09
Payer: COMMERCIAL

## 2021-06-09 VITALS
WEIGHT: 183.5 LBS | OXYGEN SATURATION: 99 % | RESPIRATION RATE: 14 BRPM | BODY MASS INDEX: 23.55 KG/M2 | HEIGHT: 74 IN | SYSTOLIC BLOOD PRESSURE: 142 MMHG | DIASTOLIC BLOOD PRESSURE: 86 MMHG | HEART RATE: 84 BPM

## 2021-06-09 DIAGNOSIS — I10 ESSENTIAL HYPERTENSION: ICD-10-CM

## 2021-06-09 DIAGNOSIS — E11.9 TYPE 2 DIABETES MELLITUS WITHOUT COMPLICATION, WITH LONG-TERM CURRENT USE OF INSULIN (HCC): ICD-10-CM

## 2021-06-09 DIAGNOSIS — Z98.84 HISTORY OF ROUX-EN-Y GASTRIC BYPASS: ICD-10-CM

## 2021-06-09 DIAGNOSIS — E78.5 DYSLIPIDEMIA: ICD-10-CM

## 2021-06-09 DIAGNOSIS — I63.30 CEREBROVASCULAR ACCIDENT (CVA) DUE TO THROMBOSIS OF CEREBRAL ARTERY (HCC): ICD-10-CM

## 2021-06-09 DIAGNOSIS — Z79.4 TYPE 2 DIABETES MELLITUS WITHOUT COMPLICATION, WITH LONG-TERM CURRENT USE OF INSULIN (HCC): ICD-10-CM

## 2021-06-09 PROCEDURE — 99214 OFFICE O/P EST MOD 30 MIN: CPT | Performed by: INTERNAL MEDICINE

## 2021-06-09 RX ORDER — INSULIN GLARGINE 300 U/ML
INJECTION, SOLUTION SUBCUTANEOUS
COMMUNITY
Start: 2021-05-04 | End: 2021-12-08

## 2021-06-09 RX ORDER — FLASH GLUCOSE SENSOR
KIT MISCELLANEOUS
COMMUNITY
Start: 2021-04-11

## 2021-06-09 RX ORDER — DULAGLUTIDE 4.5 MG/.5ML
INJECTION, SOLUTION SUBCUTANEOUS
COMMUNITY
Start: 2021-03-15 | End: 2024-03-19

## 2021-06-09 ASSESSMENT — FIBROSIS 4 INDEX: FIB4 SCORE: 1.23

## 2021-06-09 NOTE — PROGRESS NOTES
"CARDIOLOGY OUTPATIENT FOLLOWUP    PCP: Altaf Swenson D.O.    1. Cerebrovascular accident (CVA) due to thrombosis of cerebral artery (HCC)    2. Essential hypertension    3. Dyslipidemia    4. History of Amanda-en-Y gastric bypass    5. Type 2 diabetes mellitus without complication, with long-term current use of insulin (HCC)      Gil Hart is stable from a cardiac stance but has elevated BP today. I recommend checking daily for the next week and contacting me with results. Risk factors are otherwise well controlled. We discussed the importance of antiplatelet therapy and as he is concerned about clopidogrel side effects, I recommend a two week trial of aspirin 81 mg daily in lieu of plavix. If symptoms persist- I recommend resuming plavix monotherapy.     He will continue to wear the apple watch for long term AF surveillance.     Follow up: 1 year    Chief Complaint   Patient presents with   • Hyperlipidemia     F/V Dx: Mixed hyperlipidemia   • Hypertension       History: Gil Hart is a 61 y.o. male with history of CVA, DM, HTN, HLP gastric bypass presenting for follow up. His health has been stable over the past year and he continues to struggle with DM- average A1c around 8. He follows with endocrine.every three months. He has not been tracking his BP and surprised by the elevated reading this morning. He raises concerns that clopidogrel is contributing to reliance of cialis for sexual function.          ROS:   All other systems reviewed and negative except as per the HPI    PE:  /86 (BP Location: Left arm, Patient Position: Sitting, BP Cuff Size: Adult)   Pulse 84   Resp 14   Ht 1.88 m (6' 2\")   Wt 83.2 kg (183 lb 8 oz)   SpO2 99%   BMI 23.56 kg/m²   Gen: no acute distress  HEENT: Symmetric face. Anicteric sclerae. Moist mucus membranes  NECK: No JVD. No lymphadenopathy  CARDIAC: Normal PMI, Normal S1, S2, No murmur  VASCULATURE: carotids are normal bilaterally without " bruit  RESP: Clear to auscultation bilaterally  ABD: Soft, non-tender, non-distended  EXT: No edema, no clubbing or cyanosis  SKIN: Warm and dry  NEURO: No gross deficits  PSYCH: Appropriate affect, participates in conversation    The ASCVD Risk score (Kearsarge DC Jr, et al., 2013) failed to calculate.    Past Medical History:   Diagnosis Date   • Cancer (HCC)     CLL--sees Dr. Mroeira   • Chronic use of opiate drugs therapeutic purposes 8/19/2017   • Diabetes     oral medication    • Hemorrhagic disorder (HCC)     on Plavix    • Hypertension    • NSAID-induced duodenal ulcer 6/26/2017   • Obesity (BMI 30-39.9) 9/2/2016   • Pain     chronic back pain    • Stroke (HCC) 04/2016    no residual problems      Allergies   Allergen Reactions   • Pcn [Penicillins] Unspecified     RXN=Childhood allergy      Outpatient Encounter Medications as of 6/9/2021   Medication Sig Dispense Refill   • Continuous Blood Gluc Sensor (TuneIn Twitter DashboardSTYLE MARITZA 14 DAY SENSOR) Misc AS DIRECTED 14 DAYS TRANSDERMALLY 28     • TOUJEO SOLOSTAR 300 UNIT/ML Solution Pen-injector      • TRULICITY 4.5 MG/0.5ML Solution Pen-injector INJECT 1 PEN SUBCUTANEOUSLY WEEKLY     • rosuvastatin (CRESTOR) 20 MG Tab TAKE 1 TABLET BY MOUTH EVERY DAY IN THE EVENING 90 tablet 0   • tadalafil (CIALIS) 10 MG tablet TAKE ONE TABLET BY MOUTH DAILY AS NEEDED for erectile dysfunciton 10 Tab 0   • clopidogrel (PLAVIX) 75 MG Tab TAKE 1 TABLET BY MOUTH EVERY DAY 90 Tab 3   • pioglitazone (ACTOS) 15 MG Tab Take 1 Tab by mouth every day. 90 Tab 2   • Insulin Degludec (TRESIBA FLEXTOUCH) 200 UNIT/ML Solution Pen-injector Inject 20-60 Units as instructed every bedtime. 3 PEN 2   • Empagliflozin-metFORMIN HCl ER (SYNJARDY XR) 12.5-1000 MG TABLET SR 24 HR Take 1 tablet by mouth 2 Times a Day. 60 Tab 6   • Multiple Vitamin (MULTI-VITAMIN DAILY) Tab Take  by mouth.     • ondansetron (ZOFRAN) 4 MG Tab tablet Take 1 Tab by mouth every four hours as needed for Nausea/Vomiting. 20 Tab 1   •  acetaminophen (TYLENOL) 500 MG Tab Take 1,000 mg by mouth 1 time daily as needed.     • Cholecalciferol (VITAMIN D3) 2000 UNIT Cap Take 1 Cap by mouth every day.     • loratadine (CLARITIN) 10 MG Tab Take 10 mg by mouth every day.     • Cyanocobalamin (VITAMIN B-12 PO) Take 1 Tab by mouth every day.     • [DISCONTINUED] FLOVENT HFA 44 MCG/ACT Aerosol INHALE 1 PUFF BY MOUTH 2 TIMES A DAY (Patient not taking: Reported on 6/9/2021) 31.8 g 1   • [DISCONTINUED] Dulaglutide (TRULICITY) 1.5 MG/0.5ML Solution Pen-injector Inject 0.5 mL as instructed every 7 days. (Patient not taking: Reported on 6/9/2021) 4 PEN 11     No facility-administered encounter medications on file as of 6/9/2021.     Social History     Socioeconomic History   • Marital status:      Spouse name: Not on file   • Number of children: Not on file   • Years of education: Not on file   • Highest education level: Not on file   Occupational History   • Not on file   Tobacco Use   • Smoking status: Never Smoker   • Smokeless tobacco: Never Used   Vaping Use   • Vaping Use: Never used   Substance and Sexual Activity   • Alcohol use: No     Comment: Past history of alcoholism--last drink was 2010--attends Recovery Meetings at his [employer]   • Drug use: No   • Sexual activity: Yes     Partners: Female     Birth control/protection: Post-Menopausal   Other Topics Concern   • Not on file   Social History Narrative   • Not on file     Social Determinants of Health     Financial Resource Strain:    • Difficulty of Paying Living Expenses:    Food Insecurity:    • Worried About Running Out of Food in the Last Year:    • Ran Out of Food in the Last Year:    Transportation Needs:    • Lack of Transportation (Medical):    • Lack of Transportation (Non-Medical):    Physical Activity:    • Days of Exercise per Week:    • Minutes of Exercise per Session:    Stress:    • Feeling of Stress :    Social Connections:    • Frequency of Communication with Friends and Family:     • Frequency of Social Gatherings with Friends and Family:    • Attends [employer] Services:    • Active Member of Clubs or Organizations:    • Attends Club or Organization Meetings:    • Marital Status:    Intimate Partner Violence:    • Fear of Current or Ex-Partner:    • Emotionally Abused:    • Physically Abused:    • Sexually Abused:        Studies  Lab Results   Component Value Date/Time    CHOLSTRLTOT 82 (L) 09/21/2020 06:32 AM    LDL 28 09/21/2020 06:32 AM    HDL 43 09/21/2020 06:32 AM    TRIGLYCERIDE 54 09/21/2020 06:32 AM       Lab Results   Component Value Date/Time    SODIUM 143 09/21/2020 06:32 AM    POTASSIUM 4.0 09/21/2020 06:32 AM    CHLORIDE 104 09/21/2020 06:32 AM    CO2 27 09/21/2020 06:32 AM    GLUCOSE 121 (H) 09/21/2020 06:32 AM    BUN 14 09/21/2020 06:32 AM    CREATININE 1.00 09/21/2020 06:32 AM     Lab Results   Component Value Date/Time    ALKPHOSPHAT 96 09/21/2020 06:32 AM    ASTSGOT 18 09/21/2020 06:32 AM    ALTSGPT 22 09/21/2020 06:32 AM    TBILIRUBIN 0.4 09/21/2020 06:32 AM        For this encounter I reviewed the following medical records PCP notes, BMP and Lipid profile

## 2021-06-10 ENCOUNTER — APPOINTMENT (RX ONLY)
Dept: URBAN - METROPOLITAN AREA CLINIC 22 | Facility: CLINIC | Age: 61
Setting detail: DERMATOLOGY
End: 2021-06-10

## 2021-06-10 DIAGNOSIS — L57.0 ACTINIC KERATOSIS: ICD-10-CM

## 2021-06-10 DIAGNOSIS — L81.4 OTHER MELANIN HYPERPIGMENTATION: ICD-10-CM

## 2021-06-10 DIAGNOSIS — Z71.89 OTHER SPECIFIED COUNSELING: ICD-10-CM

## 2021-06-10 DIAGNOSIS — L82.0 INFLAMED SEBORRHEIC KERATOSIS: ICD-10-CM

## 2021-06-10 DIAGNOSIS — L82.1 OTHER SEBORRHEIC KERATOSIS: ICD-10-CM

## 2021-06-10 DIAGNOSIS — D22 MELANOCYTIC NEVI: ICD-10-CM

## 2021-06-10 DIAGNOSIS — L72.0 EPIDERMAL CYST: ICD-10-CM

## 2021-06-10 PROBLEM — D22.5 MELANOCYTIC NEVI OF TRUNK: Status: ACTIVE | Noted: 2021-06-10

## 2021-06-10 PROCEDURE — 17000 DESTRUCT PREMALG LESION: CPT | Mod: 59

## 2021-06-10 PROCEDURE — ? DEFER

## 2021-06-10 PROCEDURE — 99213 OFFICE O/P EST LOW 20 MIN: CPT | Mod: 25

## 2021-06-10 PROCEDURE — 17003 DESTRUCT PREMALG LES 2-14: CPT | Mod: 59

## 2021-06-10 PROCEDURE — ? SUNSCREEN TREATMENT REGIMEN

## 2021-06-10 PROCEDURE — ? LIQUID NITROGEN

## 2021-06-10 PROCEDURE — ? COUNSELING

## 2021-06-10 PROCEDURE — 17110 DESTRUCTION B9 LES UP TO 14: CPT

## 2021-06-10 ASSESSMENT — LOCATION SIMPLE DESCRIPTION DERM
LOCATION SIMPLE: RIGHT HAND
LOCATION SIMPLE: LEFT FOREARM
LOCATION SIMPLE: CHEST
LOCATION SIMPLE: RIGHT SHOULDER
LOCATION SIMPLE: LEFT EAR
LOCATION SIMPLE: UPPER BACK
LOCATION SIMPLE: RIGHT FOREARM
LOCATION SIMPLE: RIGHT EAR
LOCATION SIMPLE: INFERIOR FOREHEAD
LOCATION SIMPLE: LEFT FOREHEAD

## 2021-06-10 ASSESSMENT — LOCATION ZONE DERM
LOCATION ZONE: TRUNK
LOCATION ZONE: EAR
LOCATION ZONE: FACE
LOCATION ZONE: HAND
LOCATION ZONE: ARM

## 2021-06-10 ASSESSMENT — LOCATION DETAILED DESCRIPTION DERM
LOCATION DETAILED: RIGHT ANTITRAGUS
LOCATION DETAILED: RIGHT ULNAR DORSAL HAND
LOCATION DETAILED: LEFT SUPERIOR HELIX
LOCATION DETAILED: INFERIOR MID FOREHEAD
LOCATION DETAILED: LOWER STERNUM
LOCATION DETAILED: INFERIOR THORACIC SPINE
LOCATION DETAILED: RIGHT DISTAL DORSAL FOREARM
LOCATION DETAILED: LEFT SUPERIOR MEDIAL FOREHEAD
LOCATION DETAILED: SUPERIOR THORACIC SPINE
LOCATION DETAILED: RIGHT SUPERIOR HELIX
LOCATION DETAILED: RIGHT SUPERIOR CRUS OF ANTIHELIX
LOCATION DETAILED: RIGHT VENTRAL PROXIMAL FOREARM
LOCATION DETAILED: LEFT MEDIAL INFERIOR CHEST
LOCATION DETAILED: RIGHT POSTERIOR SHOULDER
LOCATION DETAILED: RIGHT INFERIOR HELIX
LOCATION DETAILED: LEFT VENTRAL PROXIMAL FOREARM

## 2021-06-10 NOTE — PROCEDURE: DEFER
Introduction Text (Please End With A Colon): prior authorization will be submitted
Procedure To Be Performed At Next Visit: Excision
Detail Level: Detailed

## 2021-06-10 NOTE — PROCEDURE: LIQUID NITROGEN
Duration Of Freeze Thaw-Cycle (Seconds): 0
Number Of Freeze-Thaw Cycles: 2 freeze-thaw cycles
Render Post-Care Instructions In Note?: no
Consent: The patient's consent was obtained including but not limited to risks of crusting, scabbing, blistering, scarring, darker or lighter pigmentary change, recurrence, incomplete removal and infection.
Detail Level: Detailed
Post-Care Instructions: I reviewed with the patient in detail post-care instructions. Patient is to wear sunprotection, and avoid picking at any of the treated lesions. Pt may apply Vaseline to crusted or scabbing areas.
Medical Necessity Clause: This procedure was medically necessary because the lesions that were treated were:
Medical Necessity Information: It is in your best interest to select a reason for this procedure from the list below. All of these items fulfill various CMS LCD requirements except the new and changing color options.

## 2021-06-12 ENCOUNTER — PATIENT MESSAGE (OUTPATIENT)
Dept: CARDIOLOGY | Facility: MEDICAL CENTER | Age: 61
End: 2021-06-12

## 2021-06-14 ENCOUNTER — TELEPHONE (OUTPATIENT)
Dept: CARDIOLOGY | Facility: MEDICAL CENTER | Age: 61
End: 2021-06-14

## 2021-06-14 DIAGNOSIS — I10 ESSENTIAL HYPERTENSION, BENIGN: ICD-10-CM

## 2021-06-14 RX ORDER — AMLODIPINE BESYLATE AND BENAZEPRIL HYDROCHLORIDE 5; 20 MG/1; MG/1
1 CAPSULE ORAL DAILY
Qty: 90 CAPSULE | Refills: 3 | Status: SHIPPED | OUTPATIENT
Start: 2021-06-14 | End: 2021-07-06 | Stop reason: SINTOL

## 2021-06-14 NOTE — PATIENT COMMUNICATION
Evangelista Rivera M.D.    6/14/21 2:42 PM  Note     Blood pressure is elevated above target.  I recommend starting Lotrel 5-20. BMP in two weeks. Target BP <130/80 on average.

## 2021-06-14 NOTE — TELEPHONE ENCOUNTER
Blood pressure is elevated above target.  I recommend starting Lotrel 5-20. BMP in two weeks. Target BP <130/80 on average.

## 2021-07-06 ENCOUNTER — PATIENT MESSAGE (OUTPATIENT)
Dept: CARDIOLOGY | Facility: MEDICAL CENTER | Age: 61
End: 2021-07-06

## 2021-07-06 VITALS — DIASTOLIC BLOOD PRESSURE: 60 MMHG | SYSTOLIC BLOOD PRESSURE: 100 MMHG

## 2021-07-06 RX ORDER — BENAZEPRIL HYDROCHLORIDE 20 MG/1
20 TABLET ORAL DAILY
Qty: 90 TABLET | Refills: 3 | Status: SHIPPED | OUTPATIENT
Start: 2021-07-06 | End: 2022-12-16

## 2021-07-15 DIAGNOSIS — N52.9 ERECTILE DYSFUNCTION, UNSPECIFIED ERECTILE DYSFUNCTION TYPE: ICD-10-CM

## 2021-07-15 RX ORDER — TADALAFIL 10 MG/1
TABLET ORAL
Qty: 10 TABLET | Refills: 5 | Status: SHIPPED | OUTPATIENT
Start: 2021-07-15 | End: 2021-07-20 | Stop reason: SDUPTHER

## 2021-07-20 DIAGNOSIS — N52.9 ERECTILE DYSFUNCTION, UNSPECIFIED ERECTILE DYSFUNCTION TYPE: ICD-10-CM

## 2021-07-21 RX ORDER — TADALAFIL 10 MG/1
TABLET ORAL
Qty: 10 TABLET | Refills: 0 | Status: SHIPPED | OUTPATIENT
Start: 2021-07-21 | End: 2021-10-06 | Stop reason: SDUPTHER

## 2021-07-21 NOTE — TELEPHONE ENCOUNTER
Received request via: Pharmacy    Was the patient seen in the last year in this department? NO 3/25/2020    Message sent to patient schedule annual visit    Does the patient have an active prescription (recently filled or refills available) for medication(s) requested? No

## 2021-08-24 RX ORDER — CLOPIDOGREL BISULFATE 75 MG/1
TABLET ORAL
Qty: 90 TABLET | Refills: 3 | Status: SHIPPED | OUTPATIENT
Start: 2021-08-24 | End: 2022-08-22

## 2021-08-24 NOTE — TELEPHONE ENCOUNTER
Received request via: Pharmacy    Was the patient seen in the last year in this department? Yes  3/25/2020  Does the patient have an active prescription (recently filled or refills available) for medication(s) requested? No  
Fall Risk

## 2021-08-31 ENCOUNTER — OFFICE VISIT (OUTPATIENT)
Dept: MEDICAL GROUP | Facility: LAB | Age: 61
End: 2021-08-31
Payer: COMMERCIAL

## 2021-08-31 VITALS
WEIGHT: 179.2 LBS | DIASTOLIC BLOOD PRESSURE: 64 MMHG | HEART RATE: 100 BPM | TEMPERATURE: 98.1 F | OXYGEN SATURATION: 94 % | BODY MASS INDEX: 23 KG/M2 | SYSTOLIC BLOOD PRESSURE: 114 MMHG | RESPIRATION RATE: 16 BRPM | HEIGHT: 74 IN

## 2021-08-31 DIAGNOSIS — L03.116 CELLULITIS OF LEFT LOWER EXTREMITY: ICD-10-CM

## 2021-08-31 PROBLEM — E11.65 HYPERGLYCEMIA DUE TO TYPE 2 DIABETES MELLITUS (HCC): Status: ACTIVE | Noted: 2021-08-31

## 2021-08-31 PROCEDURE — 99214 OFFICE O/P EST MOD 30 MIN: CPT | Performed by: PHYSICIAN ASSISTANT

## 2021-08-31 RX ORDER — CLINDAMYCIN HYDROCHLORIDE 300 MG/1
300 CAPSULE ORAL 4 TIMES DAILY
Qty: 28 CAPSULE | Refills: 0 | Status: SHIPPED | OUTPATIENT
Start: 2021-08-31 | End: 2021-09-07

## 2021-08-31 RX ORDER — SULFAMETHOXAZOLE AND TRIMETHOPRIM 800; 160 MG/1; MG/1
TABLET ORAL
COMMUNITY
Start: 2021-08-23 | End: 2022-11-08

## 2021-08-31 RX ORDER — PRAVASTATIN SODIUM 40 MG
TABLET ORAL
COMMUNITY
End: 2022-11-08

## 2021-08-31 ASSESSMENT — FIBROSIS 4 INDEX: FIB4 SCORE: 1.23

## 2021-08-31 NOTE — Clinical Note
FYI - added Clindamycin. Has 3 days left of Bactrim.   Allergic to PCN.     Let me know if you have any questions or concerns.   Barbara Joseph P.A.-C.

## 2021-08-31 NOTE — PROGRESS NOTES
Subjective:   Gil Hrat is a 61 y.o. male here today for acute cellulitis of left lower leg    Cellulitis of left lower extremity  New to me, acute cellulitis of his left lower extremity.  Reports that he was seen by his endocrinologist last week and started on Bactrim.  Reports that he has about 3 days left of Bactrim currently.  Noticed acutely worsening erythema and a pressure/throbbing sensation in the left lower extremity.  No fevers or chills    Patient is diabetic.  Her last A1c is from 2020 though reports that he has had a recent A1c and believes that it was 6.9.  Lab Results   Component Value Date/Time    HBA1C 7.6 (A) 03/06/2020 02:56 PM             Current medicines (including changes today)  Current Outpatient Medications   Medication Sig Dispense Refill   • sulfamethoxazole-trimethoprim (BACTRIM DS) 800-160 MG tablet 1 tablet     • clindamycin (CLEOCIN) 300 MG Cap Take 1 Capsule by mouth 4 times a day for 7 days. 28 Capsule 0   • clopidogrel (PLAVIX) 75 MG Tab TAKE 1 TABLET BY MOUTH EVERY DAY 90 Tablet 3   • rosuvastatin (CRESTOR) 20 MG Tab TAKE 1 TABLET BY MOUTH EVERY DAY IN THE EVENING 90 tablet 3   • tadalafil (CIALIS) 10 MG tablet TAKE ONE TABLET BY MOUTH DAILY AS NEEDED for erectile dysfunction 10 tablet 0   • benazepril (LOTENSIN) 20 MG Tab Take 1 tablet by mouth every day. 90 tablet 3   • Continuous Blood Gluc Sensor (FREESTYLE MARITZA 14 DAY SENSOR) Misc AS DIRECTED 14 DAYS TRANSDERMALLY 28     • TOUJEO SOLOSTAR 300 UNIT/ML Solution Pen-injector      • TRULICITY 4.5 MG/0.5ML Solution Pen-injector INJECT 1 PEN SUBCUTANEOUSLY WEEKLY     • pioglitazone (ACTOS) 15 MG Tab Take 1 Tab by mouth every day. 90 Tab 2   • Insulin Degludec (TRESIBA FLEXTOUCH) 200 UNIT/ML Solution Pen-injector Inject 20-60 Units as instructed every bedtime. 3 PEN 2   • Empagliflozin-metFORMIN HCl ER (SYNJARDY XR) 12.5-1000 MG TABLET SR 24 HR Take 1 tablet by mouth 2 Times a Day. 60 Tab 6   • Multiple Vitamin  "(MULTI-VITAMIN DAILY) Tab Take  by mouth.     • ondansetron (ZOFRAN) 4 MG Tab tablet Take 1 Tab by mouth every four hours as needed for Nausea/Vomiting. 20 Tab 1   • acetaminophen (TYLENOL) 500 MG Tab Take 1,000 mg by mouth 1 time daily as needed.     • Cholecalciferol (VITAMIN D3) 2000 UNIT Cap Take 1 Cap by mouth every day.     • loratadine (CLARITIN) 10 MG Tab Take 10 mg by mouth every day.     • Cyanocobalamin (VITAMIN B-12 PO) Take 1 Tab by mouth every day.       No current facility-administered medications for this visit.     He  has a past medical history of Cancer ([facility]), Chronic use of opiate drugs therapeutic purposes (8/19/2017), Diabetes, Hemorrhagic disorder ([facility]), Hypertension, NSAID-induced duodenal ulcer (6/26/2017), Obesity (BMI 30-39.9) (9/2/2016), Pain, and Stroke ([facility]) (04/2016).    ROS   No chest pain, no shortness of breath, no abdominal pain       Objective:     /64 (BP Location: Left arm, Patient Position: Sitting, BP Cuff Size: Adult)   Pulse 100   Temp 36.7 °C (98.1 °F) (Temporal)   Resp 16   Ht 1.88 m (6' 2\")   Wt 81.3 kg (179 lb 3.2 oz)   SpO2 94%  Body mass index is 23.01 kg/m².   Physical Exam:  Constitutional: Alert, no distress.  Skin: Warm, dry, good turgor, large scabbed wound of anterior left lower extremity with surrounding erythema.  Significant tenderness palpation surrounding the wound.  See image attached below  Eye: Equal, round and reactive, conjunctiva clear, lids normal.  Neck: Trachea midline  Respiratory: Unlabored respiratory effort,  Psych: Alert and oriented x3, normal affect and mood.            Assessment and Plan:   The following treatment plan was discussed    1. Cellulitis of left lower extremity  New to me, acutely worsening.  I encouraged him to continue Bactrim for the remaining prescription.  I will add clindamycin as written.  He is allergic to penicillins and will avoid cephalosporins due to the cross-reactivity.  Pt was educated on use and SEs " of medication.  Continue to monitor symptoms.  Encourage patient to drawing [place] around the erythema.  If there is any extending erythema to either follow-up in clinic or to go to the urgent care or the emergency room if acutely worsening.  He agrees with the plan  - clindamycin (CLEOCIN) 300 MG Cap; Take 1 Capsule by mouth 4 times a day for 7 days.  Dispense: 28 Capsule; Refill: 0      Followup: Return if symptoms worsen or fail to improve.       Barbara Joseph P.A.-C.  Supervising MD: Dr. Tacos Stewart MD  08/31/21

## 2021-08-31 NOTE — ASSESSMENT & PLAN NOTE
New to me, acute cellulitis of his left lower extremity.  Reports that he was seen by his endocrinologist last week and started on Bactrim.  Reports that he has about 3 days left of Bactrim currently.  Noticed acutely worsening erythema and a pressure/throbbing sensation in the left lower extremity.  No fevers or chills    Patient is diabetic.  Her last A1c is from 2020 though reports that he has had a recent A1c and believes that it was 6.9.  Lab Results   Component Value Date/Time    HBA1C 7.6 (A) 03/06/2020 02:56 PM

## 2021-10-06 DIAGNOSIS — N52.9 ERECTILE DYSFUNCTION, UNSPECIFIED ERECTILE DYSFUNCTION TYPE: ICD-10-CM

## 2021-10-06 RX ORDER — TADALAFIL 10 MG/1
TABLET ORAL
Qty: 10 TABLET | Refills: 3 | Status: SHIPPED | OUTPATIENT
Start: 2021-10-06 | End: 2022-05-12 | Stop reason: SDUPTHER

## 2021-12-07 ENCOUNTER — TELEPHONE (OUTPATIENT)
Dept: MEDICAL GROUP | Facility: LAB | Age: 61
End: 2021-12-07

## 2021-12-07 DIAGNOSIS — R10.13 EPIGASTRIC PAIN: ICD-10-CM

## 2021-12-07 NOTE — TELEPHONE ENCOUNTER
----- Message from Gil Hart sent at 12/6/2021  8:10 PM PST -----  Regarding: Referral to a Gastroenterologist   Dr. Swenson  I have been experiencing severe pain in the lower stomach for a couple of weeks.  Lanie thinks I need to see a Gastroenterologist.  Can you make a referral for me?    Thanks,  Robert

## 2021-12-07 NOTE — PROGRESS NOTES
"Subjective:     CC: Abdominal pain    HPI:   Gil here today with abdominal pain    Pt reports 1 month ago, he developed \"severe lower abdominal pain\" in the left lower quadrant occasional extending into the suprapubic region. He initially thought it was gas and/or constipation.  The pain persist despite regular bowel movements and laxative usage.  Patient's the pain is worse after eating, although food type (meat, dairy, gluten) does not seem to affect the pain.  He also notes occasional mild heartburn.  Patient reports 1 bowel movement approximately every 1 to 2 days.  He is not taking any laxatives or stool softeners currently.    Patient reports previous history of colonoscopy dated 5/11/2011 which had normal for normal findings at that time.  Patient is due for repeat colonoscopy      Patient denies changes in urination.  Denies flank pain.  Denies unintentional weight loss.  Denies dark, black or bloody stools    Of note, he reports that his endocrinologist recently started him on Trulicity and Tresiba.  He also takes Fiasp insulin 4IU with meals.  He reports some nausea related to the initiation of these medications which is currently controlled with Zofran.    Previous history of chronic hepatitis secondary to alcohol use.  Patient reports that he has been sober for several years.  Additional history of Amanda-en-Y gastric bypass approximately 20 years ago.  NSAID induced duodenal ulcer, resolved.  CLL which is stable, patient follows with heme-onc for this.     Lower back pain radiating to right leg  Patient reports chronic history of lower back pain with some radiation into the right leg.  It flares from time to time.  Denies any recent falls or injuries.  Denies changes in incontinence or bowel habits.  Denies saddle paresthesia.  Previously has responded well to muscle relaxers.      ROS:  Gen: no fevers/chills, no changes in weight  Pulm: no sob, no cough  CV: no chest pain, no palpitations  GI: + " nausea, no vomiting, no diarrhea  : no dysuria  MSk: no myalgias  Skin: no rash  Heme/Lymph: no easy bruising    Current Outpatient Medications Ordered in Epic   Medication Sig Dispense Refill   • tizanidine (ZANAFLEX) 4 MG Tab Take 1 Tablet by mouth 2 times a day for 30 days. 60 Tablet 0   • omeprazole (PRILOSEC) 20 MG delayed-release capsule Take 1 Capsule by mouth every day for 60 days. 30 Capsule 1   • sucralfate (CARAFATE) 1 GM Tab Take 1 Tablet by mouth 4 Times a Day,Before Meals and at Bedtime for 20 days. PRN 40 Tablet 1   • tadalafil (CIALIS) 10 MG tablet TAKE ONE TABLET BY MOUTH DAILY AS NEEDED for erectile dysfunction 10 Tablet 3   • sulfamethoxazole-trimethoprim (BACTRIM DS) 800-160 MG tablet 1 tablet     • pravastatin (PRAVACHOL) 40 MG tablet 1 tablet     • clopidogrel (PLAVIX) 75 MG Tab TAKE 1 TABLET BY MOUTH EVERY DAY 90 Tablet 3   • rosuvastatin (CRESTOR) 20 MG Tab TAKE 1 TABLET BY MOUTH EVERY DAY IN THE EVENING 90 tablet 3   • benazepril (LOTENSIN) 20 MG Tab Take 1 tablet by mouth every day. 90 tablet 3   • Continuous Blood Gluc Sensor (FREESTYLE MARITZA 14 DAY SENSOR) Misc AS DIRECTED 14 DAYS TRANSDERMALLY 28     • TRULICITY 4.5 MG/0.5ML Solution Pen-injector INJECT 1 PEN SUBCUTANEOUSLY WEEKLY     • pioglitazone (ACTOS) 15 MG Tab Take 1 Tab by mouth every day. 90 Tab 2   • Multiple Vitamin (MULTI-VITAMIN DAILY) Tab Take  by mouth.     • ondansetron (ZOFRAN) 4 MG Tab tablet Take 1 Tab by mouth every four hours as needed for Nausea/Vomiting. 20 Tab 1   • acetaminophen (TYLENOL) 500 MG Tab Take 1,000 mg by mouth 1 time daily as needed.     • Cholecalciferol (VITAMIN D3) 2000 UNIT Cap Take 1 Cap by mouth every day.     • loratadine (CLARITIN) 10 MG Tab Take 10 mg by mouth every day.     • Cyanocobalamin (VITAMIN B-12 PO) Take 1 Tab by mouth every day.     • Insulin Degludec (TRESIBA FLEXTOUCH) 200 UNIT/ML Solution Pen-injector Inject 20-60 Units as instructed every bedtime. 3 PEN 2   •  "Empagliflozin-metFORMIN HCl ER (SYNJARDY XR) 12.5-1000 MG TABLET SR 24 HR Take 1 tablet by mouth 2 Times a Day. 60 Tab 6     No current Epic-ordered facility-administered medications on file.       Objective:     Exam:  /72   Pulse 90   Temp 36.3 °C (97.4 °F)   Resp 12   Ht 1.88 m (6' 2\")   Wt 84.8 kg (187 lb)   SpO2 97%   BMI 24.01 kg/m²  Body mass index is 24.01 kg/m².    Gen: Alert and oriented, No apparent distress.  Neck: Neck is supple without lymphadenopathy.  Lungs: Normal effort, CTA bilaterally, no wheezes, rhonchi, or rales  CV: Regular rate and rhythm. No murmurs, rubs, or gallops.  Abd:     NABS, S/NT/ND, no masses or HSM, no discoloration, no bloating  Ext: No clubbing, cyanosis, edema.        Assessment & Plan:     61 y.o. male with the following -     1. Lower abdominal pain  Acute problem, stable  No red flags on physical exam today, vitals within normal limits  -Given chronicity/persistence of patient's symptoms, previous significant GI/surgical history and need for updated colonoscopy referral to GI is appropriate.  -Patient has not had lab work in over a year, ordered today.  -Question if Trulicity is a contributing factor to this abdominal pain given is a recently added medication and GI side effects can occur in approximately 10% of patient population with this medication    - Labs/tests: CBC, CMP, amylase, lipase, H. pylori breath test    - Treatments: Trial of omeprazole 20 mg daily, Carafate 1 g 4 times daily as needed for up to 10 days    - Referrals: GI referral previously ordered by PCP    - F/U Timing & Contingencies: discussed red flag symptoms and indications for return/ER      2. Low back pain radiating to right leg  Chronic problem with acute flare  Tizanidine 4 mg twice daily as needed  Activity as tolerated  Encourage gentle stretching exercises, application of heat/ice as needed  Discussed red flags and return indications    I spent a total of 25 minutes with record " review, exam, communication with the patient, communication with other providers, and documentation of this encounter.      Return if symptoms worsen or fail to improve.    Please note that this dictation was created using voice recognition software. I have made every reasonable attempt to correct obvious errors, but there may be errors of grammar and possibly content that I did not discover before finalizing the note.

## 2021-12-08 ENCOUNTER — HOSPITAL ENCOUNTER (OUTPATIENT)
Dept: LAB | Facility: MEDICAL CENTER | Age: 61
End: 2021-12-08
Attending: PHYSICIAN ASSISTANT
Payer: COMMERCIAL

## 2021-12-08 ENCOUNTER — OFFICE VISIT (OUTPATIENT)
Dept: MEDICAL GROUP | Facility: LAB | Age: 61
End: 2021-12-08
Payer: COMMERCIAL

## 2021-12-08 VITALS
HEIGHT: 74 IN | SYSTOLIC BLOOD PRESSURE: 130 MMHG | HEART RATE: 90 BPM | DIASTOLIC BLOOD PRESSURE: 72 MMHG | RESPIRATION RATE: 12 BRPM | OXYGEN SATURATION: 97 % | TEMPERATURE: 97.4 F | BODY MASS INDEX: 24 KG/M2 | WEIGHT: 187 LBS

## 2021-12-08 DIAGNOSIS — R10.30 LOWER ABDOMINAL PAIN: ICD-10-CM

## 2021-12-08 DIAGNOSIS — I10 ESSENTIAL HYPERTENSION, BENIGN: ICD-10-CM

## 2021-12-08 DIAGNOSIS — M79.604 LOW BACK PAIN RADIATING TO RIGHT LEG: ICD-10-CM

## 2021-12-08 DIAGNOSIS — M54.50 LOW BACK PAIN RADIATING TO RIGHT LEG: ICD-10-CM

## 2021-12-08 LAB
ALBUMIN SERPL BCP-MCNC: 4.6 G/DL (ref 3.2–4.9)
ALBUMIN/GLOB SERPL: 1.9 G/DL
ALP SERPL-CCNC: 100 U/L (ref 30–99)
ALT SERPL-CCNC: 15 U/L (ref 2–50)
AMYLASE SERPL-CCNC: 56 U/L (ref 20–103)
ANION GAP SERPL CALC-SCNC: 11 MMOL/L (ref 7–16)
AST SERPL-CCNC: 19 U/L (ref 12–45)
BILIRUB SERPL-MCNC: 0.4 MG/DL (ref 0.1–1.5)
BUN SERPL-MCNC: 17 MG/DL (ref 8–22)
CALCIUM SERPL-MCNC: 9.3 MG/DL (ref 8.5–10.5)
CHLORIDE SERPL-SCNC: 104 MMOL/L (ref 96–112)
CO2 SERPL-SCNC: 25 MMOL/L (ref 20–33)
CREAT SERPL-MCNC: 0.98 MG/DL (ref 0.5–1.4)
FASTING STATUS PATIENT QL REPORTED: NORMAL
GLOBULIN SER CALC-MCNC: 2.4 G/DL (ref 1.9–3.5)
GLUCOSE SERPL-MCNC: 248 MG/DL (ref 65–99)
LIPASE SERPL-CCNC: 16 U/L (ref 11–82)
POTASSIUM SERPL-SCNC: 5 MMOL/L (ref 3.6–5.5)
PROT SERPL-MCNC: 7 G/DL (ref 6–8.2)
SODIUM SERPL-SCNC: 140 MMOL/L (ref 135–145)

## 2021-12-08 PROCEDURE — 85007 BL SMEAR W/DIFF WBC COUNT: CPT

## 2021-12-08 PROCEDURE — 83013 H PYLORI (C-13) BREATH: CPT

## 2021-12-08 PROCEDURE — 80053 COMPREHEN METABOLIC PANEL: CPT

## 2021-12-08 PROCEDURE — 99214 OFFICE O/P EST MOD 30 MIN: CPT | Performed by: PHYSICIAN ASSISTANT

## 2021-12-08 PROCEDURE — 85027 COMPLETE CBC AUTOMATED: CPT

## 2021-12-08 PROCEDURE — 36415 COLL VENOUS BLD VENIPUNCTURE: CPT

## 2021-12-08 PROCEDURE — 82150 ASSAY OF AMYLASE: CPT

## 2021-12-08 PROCEDURE — 83690 ASSAY OF LIPASE: CPT

## 2021-12-08 RX ORDER — TIZANIDINE 4 MG/1
4 TABLET ORAL 2 TIMES DAILY
Qty: 60 TABLET | Refills: 0 | Status: SHIPPED | OUTPATIENT
Start: 2021-12-08 | End: 2021-12-30

## 2021-12-08 RX ORDER — SUCRALFATE 1 G/1
1 TABLET ORAL
Qty: 40 TABLET | Refills: 1 | Status: SHIPPED | OUTPATIENT
Start: 2021-12-08 | End: 2022-01-28

## 2021-12-08 RX ORDER — OMEPRAZOLE 20 MG/1
20 CAPSULE, DELAYED RELEASE ORAL DAILY
Qty: 30 CAPSULE | Refills: 1 | Status: SHIPPED | OUTPATIENT
Start: 2021-12-08 | End: 2022-01-03

## 2021-12-08 ASSESSMENT — FIBROSIS 4 INDEX: FIB4 SCORE: 1.23

## 2021-12-09 LAB
ANISOCYTOSIS BLD QL SMEAR: ABNORMAL
BASOPHILS # BLD AUTO: 0 % (ref 0–1.8)
BASOPHILS # BLD: 0 K/UL (ref 0–0.12)
BURR CELLS BLD QL SMEAR: NORMAL
EOSINOPHIL # BLD AUTO: 0.09 K/UL (ref 0–0.51)
EOSINOPHIL NFR BLD: 0.9 % (ref 0–6.9)
ERYTHROCYTE [DISTWIDTH] IN BLOOD BY AUTOMATED COUNT: 44.1 FL (ref 35.9–50)
HCT VFR BLD AUTO: 40.8 % (ref 42–52)
HGB BLD-MCNC: 12.9 G/DL (ref 14–18)
LYMPHOCYTES # BLD AUTO: 6.96 K/UL (ref 1–4.8)
LYMPHOCYTES NFR BLD: 68.2 % (ref 22–41)
MACROCYTES BLD QL SMEAR: ABNORMAL
MANUAL DIFF BLD: NORMAL
MCH RBC QN AUTO: 28.7 PG (ref 27–33)
MCHC RBC AUTO-ENTMCNC: 31.6 G/DL (ref 33.7–35.3)
MCV RBC AUTO: 90.9 FL (ref 81.4–97.8)
MICROCYTES BLD QL SMEAR: ABNORMAL
MONOCYTES # BLD AUTO: 0.56 K/UL (ref 0–0.85)
MONOCYTES NFR BLD AUTO: 5.5 % (ref 0–13.4)
MORPHOLOGY BLD-IMP: NORMAL
NEUTROPHILS # BLD AUTO: 2.59 K/UL (ref 1.82–7.42)
NEUTROPHILS NFR BLD: 25.4 % (ref 44–72)
NRBC # BLD AUTO: 0 K/UL
NRBC BLD-RTO: 0 /100 WBC
PLATELET # BLD AUTO: 143 K/UL (ref 164–446)
PLATELET BLD QL SMEAR: NORMAL
PMV BLD AUTO: 11.3 FL (ref 9–12.9)
POIKILOCYTOSIS BLD QL SMEAR: NORMAL
RBC # BLD AUTO: 4.49 M/UL (ref 4.7–6.1)
RBC BLD AUTO: PRESENT
SMUDGE CELLS BLD QL SMEAR: NORMAL
WBC # BLD AUTO: 10.2 K/UL (ref 4.8–10.8)

## 2021-12-10 LAB — UREA BREATH TEST QL: NEGATIVE

## 2021-12-30 DIAGNOSIS — M54.50 LOW BACK PAIN RADIATING TO RIGHT LEG: ICD-10-CM

## 2021-12-30 DIAGNOSIS — M79.604 LOW BACK PAIN RADIATING TO RIGHT LEG: ICD-10-CM

## 2021-12-30 RX ORDER — TIZANIDINE 4 MG/1
TABLET ORAL
Qty: 60 TABLET | Refills: 0 | Status: SHIPPED | OUTPATIENT
Start: 2021-12-30 | End: 2022-01-03

## 2022-01-03 DIAGNOSIS — M54.50 LOW BACK PAIN RADIATING TO RIGHT LEG: ICD-10-CM

## 2022-01-03 DIAGNOSIS — M79.604 LOW BACK PAIN RADIATING TO RIGHT LEG: ICD-10-CM

## 2022-01-03 RX ORDER — TIZANIDINE 4 MG/1
TABLET ORAL
Qty: 180 TABLET | Refills: 1 | Status: SHIPPED | OUTPATIENT
Start: 2022-01-03 | End: 2022-05-02

## 2022-01-03 RX ORDER — OMEPRAZOLE 20 MG/1
CAPSULE, DELAYED RELEASE ORAL
Qty: 30 CAPSULE | Refills: 1 | Status: SHIPPED | OUTPATIENT
Start: 2022-01-03 | End: 2022-01-27

## 2022-01-03 NOTE — TELEPHONE ENCOUNTER
Received request via: Pharmacy    Was the patient seen in the last year in this department? Yes  LOV: 12/8/2021  Does the patient have an active prescription (recently filled or refills available) for medication(s) requested? No

## 2022-01-04 ENCOUNTER — APPOINTMENT (OUTPATIENT)
Dept: MEDICAL GROUP | Facility: LAB | Age: 62
End: 2022-01-04
Payer: COMMERCIAL

## 2022-01-27 DIAGNOSIS — K26.9 NSAID-INDUCED DUODENAL ULCER: ICD-10-CM

## 2022-01-27 DIAGNOSIS — T39.395A NSAID-INDUCED DUODENAL ULCER: ICD-10-CM

## 2022-01-27 RX ORDER — OMEPRAZOLE 20 MG/1
CAPSULE, DELAYED RELEASE ORAL
Qty: 90 CAPSULE | Refills: 3 | Status: SHIPPED | OUTPATIENT
Start: 2022-01-27 | End: 2022-11-08

## 2022-01-28 RX ORDER — SUCRALFATE 1 G/1
TABLET ORAL
Qty: 40 TABLET | Refills: 1 | Status: SHIPPED | OUTPATIENT
Start: 2022-01-28 | End: 2022-11-08

## 2022-01-28 NOTE — TELEPHONE ENCOUNTER
Received request via: Pharmacy    Was the patient seen in the last year in this department? Yes  12/8/2021  Does the patient have an active prescription (recently filled or refills available) for medication(s) requested? No

## 2022-02-10 ENCOUNTER — PATIENT MESSAGE (OUTPATIENT)
Dept: MEDICAL GROUP | Facility: LAB | Age: 62
End: 2022-02-10

## 2022-02-10 RX ORDER — ONDANSETRON 4 MG/1
4 TABLET, FILM COATED ORAL EVERY 4 HOURS PRN
Qty: 20 TABLET | Refills: 1 | Status: SHIPPED | OUTPATIENT
Start: 2022-02-10 | End: 2022-03-28

## 2022-02-25 ENCOUNTER — TELEPHONE (OUTPATIENT)
Dept: CARDIOLOGY | Facility: MEDICAL CENTER | Age: 62
End: 2022-02-25
Payer: COMMERCIAL

## 2022-02-25 ENCOUNTER — PATIENT MESSAGE (OUTPATIENT)
Dept: CARDIOLOGY | Facility: MEDICAL CENTER | Age: 62
End: 2022-02-25
Payer: COMMERCIAL

## 2022-02-25 NOTE — TELEPHONE ENCOUNTER
Evangelista Rivera M.D.  You 51 minutes ago (9:52 AM)       Ok to proceed, can hold plavix 5 days     Thx   BE    Message text      _____________________________________________________________________    Clearance letter generated and faxed to [facility] at [phone]. Receipt confirmed.     Left message for Magaly at [facility] with update. Spoke to patient over phone and gave update.

## 2022-02-25 NOTE — LETTER
PROCEDURE/SURGERY CLEARANCE FORM      Encounter Date: 02/25/2022    Patient: Gil Hart  YOB: 1960    CARDIOLOGIST:  Evangelista Rivera MD      REFERRING DOCTOR:  Wally Monroy MD         The above patient may proceed with the following procedure/surgery: Colonoscopy with moderate sedation.                                           Additional comments: Patient may hold Plavix 5 days prior to procedure.         Evangelista Rivera MD   Electronically Signed

## 2022-02-25 NOTE — TELEPHONE ENCOUNTER
Received clearance request from [facility]. Fax response to [phone].    Procedure: Colonoscopy with moderate sedation    Date: 02/28/22    Requesting patient hold Plavix 5 days prior to procedure.    HX: CVA, HTN, DM type II    Last seen: 06/09/21        To BE: Ok to proceed with procedure and hold Plavix until procedure on 02/28/22?

## 2022-02-25 NOTE — TELEPHONE ENCOUNTER
Magaly from [facility] called stating she did not receive the clearance and is asking that it be faxed again to [phone].    Thank you

## 2022-02-25 NOTE — TELEPHONE ENCOUNTER
Clearance re-faxed to [facility] at [phone]. Receipt confirmed. Spoke to Magaly over phone and gave update.

## 2022-02-25 NOTE — TELEPHONE ENCOUNTER
IBAN Mckenzie from [facility] called to check on Pts medical clearance. Pt is scheduled for a procedure on Monday.  # [phone] ext [phone]  Fax# [phone]    Thank you

## 2022-03-01 ENCOUNTER — PATIENT MESSAGE (OUTPATIENT)
Dept: CARDIOLOGY | Facility: MEDICAL CENTER | Age: 62
End: 2022-03-01
Payer: COMMERCIAL

## 2022-03-01 ENCOUNTER — TELEPHONE (OUTPATIENT)
Dept: CARDIOLOGY | Facility: MEDICAL CENTER | Age: 62
End: 2022-03-01
Payer: COMMERCIAL

## 2022-03-01 DIAGNOSIS — R94.31 NONSPECIFIC ABNORMAL ELECTROCARDIOGRAM (ECG) (EKG): ICD-10-CM

## 2022-03-01 DIAGNOSIS — I10 ESSENTIAL HYPERTENSION, BENIGN: ICD-10-CM

## 2022-03-01 NOTE — TELEPHONE ENCOUNTER
Left message at [facility] requesting EKG from 02/28/22 procedure be faxed over to assess further.

## 2022-03-01 NOTE — TELEPHONE ENCOUNTER
Patient returned call, spoke over phone. Patient states following his colonoscopy yesterday he was notified he was in Afib. Patient was given EKG that he will upload to [facility] and send to us. Per patient, he is feeling great and not experiencing any symptoms.

## 2022-03-01 NOTE — TELEPHONE ENCOUNTER
BE    PT called to advise that during their Colonoscopy they saw the Pt was in Afib, they wanted them to contact their Cards. The Pt can be reached [phone].    Thank you,  Zayra CHRISTINE

## 2022-03-02 NOTE — TELEPHONE ENCOUNTER
Message  Received: Today  Evangelista Rivera M.D.  Roxana Sigala R.N.  Caller: Unspecified (Today, 11:02 AM)  Looks like AF but could be just PACs. I suggest having him come in for an ECG - if hes in AF will need anticoagulation. If not in Afib I would have him do a Zio. Also schedule a clinic visit after the evaluation is complete     Thx   BE      Previous Messages     ----- Message -----   From: Roxana Sigala R.N.   Sent: 3/1/2022   3:48 PM PST   To: Evangelista Rivera M.D.     ----- Message from Roxana Sigala R.N. sent at 3/1/2022  3:48 PM PST -----   Hi! Patient had colonoscopy yesterday and was told he was in Afib during procedure, HR indicates 120. I had him upload strip (in media dated 03/01/22). Patient states he feels fine, but he wanted you to have a look. Thanks!   ______________________________________________________________________    Spoke to patient over phone. Non-provider visit set for 03/04/22 at 1000. EKG to be obtained. Patient has follow up appointment set up for 04/07/22 with SC.

## 2022-03-04 ENCOUNTER — NON-PROVIDER VISIT (OUTPATIENT)
Dept: CARDIOLOGY | Facility: MEDICAL CENTER | Age: 62
End: 2022-03-04
Payer: COMMERCIAL

## 2022-03-04 ENCOUNTER — TELEPHONE (OUTPATIENT)
Dept: CARDIOLOGY | Facility: MEDICAL CENTER | Age: 62
End: 2022-03-04

## 2022-03-04 DIAGNOSIS — I48.91 ATRIAL FIBRILLATION, UNSPECIFIED TYPE (HCC): Primary | ICD-10-CM

## 2022-03-04 DIAGNOSIS — I49.3 PVC (PREMATURE VENTRICULAR CONTRACTION): ICD-10-CM

## 2022-03-04 DIAGNOSIS — I49.1 PREMATURE ATRIAL CONTRACTIONS: ICD-10-CM

## 2022-03-04 NOTE — TELEPHONE ENCOUNTER
>Patient enrolled in 14 day ZioAT Patch program per Dr. Rivera.    >In office hookup with [facility] Transmitted.    >Pending EOS

## 2022-03-04 NOTE — TELEPHONE ENCOUNTER
Patient in office for EKG check per BE. Reviewed with SC who verifies patient is not in Afib. 14 day live Zio order placed. Uma SCHULTZ to place in office.    Spoke to patient and gave update. Patient verbalizes understanding. He states he has been feeling great.

## 2022-03-08 LAB — EKG IMPRESSION: NORMAL

## 2022-03-08 PROCEDURE — 93000 ELECTROCARDIOGRAM COMPLETE: CPT | Performed by: INTERNAL MEDICINE

## 2022-03-24 ENCOUNTER — TELEPHONE (OUTPATIENT)
Dept: CARDIOLOGY | Facility: MEDICAL CENTER | Age: 62
End: 2022-03-24
Payer: COMMERCIAL

## 2022-03-24 DIAGNOSIS — I10 ESSENTIAL HYPERTENSION, BENIGN: ICD-10-CM

## 2022-03-24 DIAGNOSIS — R94.31 NONSPECIFIC ABNORMAL ELECTROCARDIOGRAM (ECG) (EKG): ICD-10-CM

## 2022-03-28 PROCEDURE — 93268 ECG RECORD/REVIEW: CPT | Performed by: INTERNAL MEDICINE

## 2022-03-28 RX ORDER — ONDANSETRON 4 MG/1
4 TABLET, FILM COATED ORAL EVERY 4 HOURS PRN
Qty: 20 TABLET | Refills: 1 | Status: SHIPPED | OUTPATIENT
Start: 2022-03-28 | End: 2022-05-12 | Stop reason: SDUPTHER

## 2022-03-31 ENCOUNTER — TELEPHONE (OUTPATIENT)
Dept: CARDIOLOGY | Facility: MEDICAL CENTER | Age: 62
End: 2022-03-31
Payer: COMMERCIAL

## 2022-03-31 ENCOUNTER — PATIENT MESSAGE (OUTPATIENT)
Dept: CARDIOLOGY | Facility: MEDICAL CENTER | Age: 62
End: 2022-03-31
Payer: COMMERCIAL

## 2022-03-31 NOTE — TELEPHONE ENCOUNTER
----- Message from Evangelista Rivera M.D. sent at 3/28/2022  9:21 AM PDT -----  The monitor looks good.  No significant arrhythmias.  On one event the symptomatic events where the button was pushed this corresponded to a premature ventricular contraction which is a benign skipped heartbeat.  Symptoms could be lessened with the use of medication which I do not feel is necessary at this time.  Please let me know if you have any questions.  
Left message for patient to return call and discuss results or review MyChart message.   
97.3

## 2022-04-29 ENCOUNTER — HOSPITAL ENCOUNTER (OUTPATIENT)
Dept: LAB | Facility: MEDICAL CENTER | Age: 62
End: 2022-04-29
Attending: PHYSICIAN ASSISTANT
Payer: COMMERCIAL

## 2022-04-29 LAB
ALBUMIN SERPL BCP-MCNC: 4.6 G/DL (ref 3.2–4.9)
ALBUMIN/GLOB SERPL: 1.9 G/DL
ALP SERPL-CCNC: 103 U/L (ref 30–99)
ALT SERPL-CCNC: 17 U/L (ref 2–50)
ANION GAP SERPL CALC-SCNC: 13 MMOL/L (ref 7–16)
AST SERPL-CCNC: 20 U/L (ref 12–45)
BILIRUB SERPL-MCNC: 0.4 MG/DL (ref 0.1–1.5)
BUN SERPL-MCNC: 14 MG/DL (ref 8–22)
CALCIUM SERPL-MCNC: 9.7 MG/DL (ref 8.5–10.5)
CHLORIDE SERPL-SCNC: 102 MMOL/L (ref 96–112)
CO2 SERPL-SCNC: 25 MMOL/L (ref 20–33)
CORTIS SERPL-MCNC: 1.2 UG/DL (ref 0–23)
CREAT SERPL-MCNC: 0.97 MG/DL (ref 0.5–1.4)
FASTING STATUS PATIENT QL REPORTED: NORMAL
GFR SERPLBLD CREATININE-BSD FMLA CKD-EPI: 88 ML/MIN/1.73 M 2
GLOBULIN SER CALC-MCNC: 2.4 G/DL (ref 1.9–3.5)
GLUCOSE SERPL-MCNC: 186 MG/DL (ref 65–99)
POTASSIUM SERPL-SCNC: 5.2 MMOL/L (ref 3.6–5.5)
PROT SERPL-MCNC: 7 G/DL (ref 6–8.2)
SODIUM SERPL-SCNC: 140 MMOL/L (ref 135–145)
T4 FREE SERPL-MCNC: 1.45 NG/DL (ref 0.93–1.7)
TSH SERPL DL<=0.005 MIU/L-ACNC: 1.1 UIU/ML (ref 0.38–5.33)

## 2022-04-29 PROCEDURE — 36415 COLL VENOUS BLD VENIPUNCTURE: CPT

## 2022-04-29 PROCEDURE — 80053 COMPREHEN METABOLIC PANEL: CPT

## 2022-04-29 PROCEDURE — 84443 ASSAY THYROID STIM HORMONE: CPT

## 2022-04-29 PROCEDURE — 80299 QUANTITATIVE ASSAY DRUG: CPT

## 2022-04-29 PROCEDURE — 82533 TOTAL CORTISOL: CPT

## 2022-04-29 PROCEDURE — 82024 ASSAY OF ACTH: CPT

## 2022-04-29 PROCEDURE — 84439 ASSAY OF FREE THYROXINE: CPT

## 2022-04-30 LAB — ACTH PLAS-MCNC: <1.5 PG/ML (ref 7.2–63.3)

## 2022-05-02 DIAGNOSIS — M79.604 LOW BACK PAIN RADIATING TO RIGHT LEG: ICD-10-CM

## 2022-05-02 DIAGNOSIS — M54.50 LOW BACK PAIN RADIATING TO RIGHT LEG: ICD-10-CM

## 2022-05-02 RX ORDER — TIZANIDINE 4 MG/1
TABLET ORAL
Qty: 180 TABLET | Refills: 1 | Status: SHIPPED | OUTPATIENT
Start: 2022-05-02 | End: 2022-05-12 | Stop reason: SDUPTHER

## 2022-05-05 LAB — TEST NAME 95000: NORMAL

## 2022-05-12 DIAGNOSIS — M79.604 LOW BACK PAIN RADIATING TO RIGHT LEG: ICD-10-CM

## 2022-05-12 DIAGNOSIS — N52.9 ERECTILE DYSFUNCTION, UNSPECIFIED ERECTILE DYSFUNCTION TYPE: ICD-10-CM

## 2022-05-12 DIAGNOSIS — M54.50 LOW BACK PAIN RADIATING TO RIGHT LEG: ICD-10-CM

## 2022-05-12 RX ORDER — TIZANIDINE 4 MG/1
4 TABLET ORAL 2 TIMES DAILY
Qty: 180 TABLET | Refills: 1 | Status: SHIPPED | OUTPATIENT
Start: 2022-05-12 | End: 2022-11-04

## 2022-05-12 RX ORDER — ONDANSETRON 4 MG/1
4 TABLET, FILM COATED ORAL EVERY 4 HOURS PRN
Qty: 20 TABLET | Refills: 1 | Status: SHIPPED | OUTPATIENT
Start: 2022-05-12 | End: 2022-06-07 | Stop reason: SDUPTHER

## 2022-05-12 RX ORDER — TADALAFIL 10 MG/1
TABLET ORAL
Qty: 10 TABLET | Refills: 3 | Status: SHIPPED | OUTPATIENT
Start: 2022-05-12 | End: 2022-07-29

## 2022-05-12 NOTE — TELEPHONE ENCOUNTER
Received request via: Patient    Was the patient seen in the last year in this department? Yes  LOV 12/08/2021  Does the patient have an active prescription (recently filled or refills available) for medication(s) requested? No

## 2022-06-07 RX ORDER — ONDANSETRON 4 MG/1
4 TABLET, FILM COATED ORAL EVERY 4 HOURS PRN
Qty: 90 TABLET | Refills: 1 | Status: SHIPPED | OUTPATIENT
Start: 2022-06-07 | End: 2022-09-26

## 2022-07-27 DIAGNOSIS — E78.2 MIXED HYPERLIPIDEMIA: ICD-10-CM

## 2022-07-28 RX ORDER — ROSUVASTATIN CALCIUM 20 MG/1
TABLET, COATED ORAL
Qty: 90 TABLET | Refills: 0 | Status: SHIPPED | OUTPATIENT
Start: 2022-07-28 | End: 2022-11-04

## 2022-07-29 DIAGNOSIS — N52.9 ERECTILE DYSFUNCTION, UNSPECIFIED ERECTILE DYSFUNCTION TYPE: ICD-10-CM

## 2022-07-29 RX ORDER — TADALAFIL 10 MG/1
TABLET ORAL
Qty: 10 TABLET | Refills: 0 | Status: SHIPPED | OUTPATIENT
Start: 2022-07-29 | End: 2022-11-21 | Stop reason: SDUPTHER

## 2022-07-29 RX ORDER — TADALAFIL 10 MG/1
TABLET ORAL
Qty: 10 TABLET | Refills: 3 | Status: SHIPPED | OUTPATIENT
Start: 2022-07-29 | End: 2022-11-08

## 2022-07-29 NOTE — TELEPHONE ENCOUNTER
Received request via: Pharmacy    Was the patient seen in the last year in this department? Yes, LOV: 12/08/2021    Does the patient have an active prescription (recently filled or refills available) for medication(s) requested? No

## 2022-08-07 NOTE — TELEPHONE ENCOUNTER
1st courtesy refill. Note sent to schedulers to follow up.   
Is the patient due for a refill? Yes    Was the patient seen the past year? No    Date of last office visit: 06/9/2021    Does the patient have an upcoming appointment?  No    Provider to refill:BE    Does the patients insurance require a 100 day supply?  No    
LM for pt to schedule fv and sent mychart reminder.   slc  
Patient/Caregiver requests family/friend to interpret.

## 2022-08-22 RX ORDER — CLOPIDOGREL BISULFATE 75 MG/1
TABLET ORAL
Qty: 90 TABLET | Refills: 4 | Status: SHIPPED | OUTPATIENT
Start: 2022-08-22 | End: 2022-11-21 | Stop reason: SDUPTHER

## 2022-09-26 RX ORDER — ONDANSETRON 4 MG/1
4 TABLET, FILM COATED ORAL EVERY 4 HOURS PRN
Qty: 90 TABLET | Refills: 1 | Status: SHIPPED | OUTPATIENT
Start: 2022-09-26 | End: 2023-04-13

## 2022-09-26 NOTE — TELEPHONE ENCOUNTER
Received request via: Pharmacy    Was the patient seen in the last year in this department? Yes  12/8/21  Does the patient have an active prescription (recently filled or refills available) for medication(s) requested? No

## 2022-11-03 ENCOUNTER — APPOINTMENT (RX ONLY)
Dept: URBAN - METROPOLITAN AREA CLINIC 22 | Facility: CLINIC | Age: 62
Setting detail: DERMATOLOGY
End: 2022-11-03

## 2022-11-03 DIAGNOSIS — Z71.89 OTHER SPECIFIED COUNSELING: ICD-10-CM

## 2022-11-03 DIAGNOSIS — M79.604 LOW BACK PAIN RADIATING TO RIGHT LEG: ICD-10-CM

## 2022-11-03 DIAGNOSIS — E78.2 MIXED HYPERLIPIDEMIA: ICD-10-CM

## 2022-11-03 DIAGNOSIS — D22 MELANOCYTIC NEVI: ICD-10-CM

## 2022-11-03 DIAGNOSIS — L82.1 OTHER SEBORRHEIC KERATOSIS: ICD-10-CM

## 2022-11-03 DIAGNOSIS — L82.0 INFLAMED SEBORRHEIC KERATOSIS: ICD-10-CM

## 2022-11-03 DIAGNOSIS — L57.0 ACTINIC KERATOSIS: ICD-10-CM

## 2022-11-03 DIAGNOSIS — L81.4 OTHER MELANIN HYPERPIGMENTATION: ICD-10-CM

## 2022-11-03 DIAGNOSIS — M54.50 LOW BACK PAIN RADIATING TO RIGHT LEG: ICD-10-CM

## 2022-11-03 PROBLEM — D22.5 MELANOCYTIC NEVI OF TRUNK: Status: ACTIVE | Noted: 2022-11-03

## 2022-11-03 PROCEDURE — 99213 OFFICE O/P EST LOW 20 MIN: CPT | Mod: 25

## 2022-11-03 PROCEDURE — ? COUNSELING

## 2022-11-03 PROCEDURE — ? LIQUID NITROGEN

## 2022-11-03 PROCEDURE — 17000 DESTRUCT PREMALG LESION: CPT | Mod: 59

## 2022-11-03 PROCEDURE — 17110 DESTRUCTION B9 LES UP TO 14: CPT

## 2022-11-03 PROCEDURE — 17003 DESTRUCT PREMALG LES 2-14: CPT | Mod: 59

## 2022-11-03 PROCEDURE — ? SUNSCREEN TREATMENT REGIMEN

## 2022-11-03 ASSESSMENT — LOCATION DETAILED DESCRIPTION DERM
LOCATION DETAILED: LEFT VENTRAL PROXIMAL FOREARM
LOCATION DETAILED: LEFT SUPERIOR FOREHEAD
LOCATION DETAILED: INFERIOR MID FOREHEAD
LOCATION DETAILED: LEFT SUPERIOR PARIETAL SCALP
LOCATION DETAILED: LEFT CENTRAL TEMPLE
LOCATION DETAILED: LEFT CLAVICULAR NECK
LOCATION DETAILED: RIGHT VENTRAL PROXIMAL FOREARM
LOCATION DETAILED: LEFT LATERAL TEMPLE
LOCATION DETAILED: INFERIOR THORACIC SPINE
LOCATION DETAILED: SUPERIOR THORACIC SPINE
LOCATION DETAILED: LEFT FOREHEAD
LOCATION DETAILED: LEFT MEDIAL FOREHEAD

## 2022-11-03 ASSESSMENT — LOCATION SIMPLE DESCRIPTION DERM
LOCATION SIMPLE: LEFT FOREARM
LOCATION SIMPLE: LEFT ANTERIOR NECK
LOCATION SIMPLE: LEFT FOREHEAD
LOCATION SIMPLE: LEFT TEMPLE
LOCATION SIMPLE: UPPER BACK
LOCATION SIMPLE: SCALP
LOCATION SIMPLE: INFERIOR FOREHEAD
LOCATION SIMPLE: RIGHT FOREARM

## 2022-11-03 ASSESSMENT — LOCATION ZONE DERM
LOCATION ZONE: FACE
LOCATION ZONE: ARM
LOCATION ZONE: TRUNK
LOCATION ZONE: SCALP
LOCATION ZONE: NECK

## 2022-11-03 NOTE — PROCEDURE: LIQUID NITROGEN
Post-Care Instructions: I reviewed with the patient in detail post-care instructions. Patient is to wear sunprotection, and avoid picking at any of the treated lesions. Pt may apply Vaseline to crusted or scabbing areas.
Medical Necessity Information: It is in your best interest to select a reason for this procedure from the list below. All of these items fulfill various CMS LCD requirements except the new and changing color options.
Spray Paint Text: The liquid nitrogen was applied to the skin utilizing a spray paint frosting technique.
Show Spray Paint Technique Variable?: Yes
Include Z78.9 (Other Specified Conditions Influencing Health Status) As An Associated Diagnosis?: No
Consent: The patient's consent was obtained including but not limited to risks of crusting, scabbing, blistering, scarring, darker or lighter pigmentary change, recurrence, incomplete removal and infection.
Medical Necessity Clause: This procedure was medically necessary because the lesions that were treated were:
Detail Level: Detailed
Number Of Freeze-Thaw Cycles: 1 freeze-thaw cycle
Duration Of Freeze Thaw-Cycle (Seconds): 0
(2) well flexed

## 2022-11-04 RX ORDER — TIZANIDINE 4 MG/1
TABLET ORAL
Qty: 180 TABLET | Refills: 1 | Status: SHIPPED | OUTPATIENT
Start: 2022-11-04 | End: 2023-06-01

## 2022-11-04 NOTE — TELEPHONE ENCOUNTER
Is the patient due for a refill? Yes    Was the patient seen the past year? Yes    Date of last office visit: 6/9/21    Does the patient have an upcoming appointment?  Yes   If yes, When? 11/8/22    Provider to refill:be    Does the patients insurance require a 100 day supply?  No

## 2022-11-04 NOTE — TELEPHONE ENCOUNTER
Patient with pravastatin and rosuvastatin on medication list. Please advise appropriate medication.

## 2022-11-07 RX ORDER — ROSUVASTATIN CALCIUM 20 MG/1
TABLET, COATED ORAL
Qty: 30 TABLET | Refills: 0 | Status: SHIPPED | OUTPATIENT
Start: 2022-11-07 | End: 2023-01-20

## 2022-11-08 ENCOUNTER — OFFICE VISIT (OUTPATIENT)
Dept: CARDIOLOGY | Facility: MEDICAL CENTER | Age: 62
End: 2022-11-08
Payer: COMMERCIAL

## 2022-11-08 VITALS
HEIGHT: 74 IN | SYSTOLIC BLOOD PRESSURE: 130 MMHG | HEART RATE: 90 BPM | OXYGEN SATURATION: 97 % | BODY MASS INDEX: 24.13 KG/M2 | DIASTOLIC BLOOD PRESSURE: 82 MMHG | RESPIRATION RATE: 16 BRPM | WEIGHT: 188 LBS

## 2022-11-08 DIAGNOSIS — I10 PRIMARY HYPERTENSION: ICD-10-CM

## 2022-11-08 DIAGNOSIS — K26.9 NSAID-INDUCED DUODENAL ULCER: ICD-10-CM

## 2022-11-08 DIAGNOSIS — T39.395A NSAID-INDUCED DUODENAL ULCER: ICD-10-CM

## 2022-11-08 DIAGNOSIS — C91.10 CLL (CHRONIC LYMPHOCYTIC LEUKEMIA) (HCC): ICD-10-CM

## 2022-11-08 DIAGNOSIS — E78.2 MIXED HYPERLIPIDEMIA: ICD-10-CM

## 2022-11-08 DIAGNOSIS — I63.30 CEREBROVASCULAR ACCIDENT (CVA) DUE TO THROMBOSIS OF CEREBRAL ARTERY (HCC): ICD-10-CM

## 2022-11-08 PROCEDURE — 99214 OFFICE O/P EST MOD 30 MIN: CPT | Performed by: INTERNAL MEDICINE

## 2022-11-08 RX ORDER — FERROUS SULFATE 325(65) MG
325 TABLET ORAL 2 TIMES DAILY
COMMUNITY
Start: 2022-09-28 | End: 2024-03-19

## 2022-11-08 RX ORDER — AZITHROMYCIN 250 MG/1
TABLET, FILM COATED ORAL
COMMUNITY
Start: 2022-10-10 | End: 2022-11-08

## 2022-11-08 RX ORDER — EMPAGLIFLOZIN, METFORMIN HYDROCHLORIDE 12.5; 1 MG/1; MG/1
1 TABLET, EXTENDED RELEASE ORAL 2 TIMES DAILY
COMMUNITY
Start: 2022-10-05

## 2022-11-08 RX ORDER — CEPHALEXIN 500 MG/1
CAPSULE ORAL
COMMUNITY
Start: 2022-09-27 | End: 2022-11-08

## 2022-11-08 ASSESSMENT — FIBROSIS 4 INDEX: FIB4 SCORE: 2.14

## 2022-11-08 NOTE — PROGRESS NOTES
"CARDIOLOGY OUTPATIENT FOLLOWUP    PCP: Altaf Swenson D.O.    1. Primary hypertension    2. Mixed hyperlipidemia    3. CLL (chronic lymphocytic leukemia) (HCC)    4. Cerebrovascular accident (CVA) due to thrombosis of cerebral artery (HCC)    5. NSAID-induced duodenal ulcer        Gil Hart is stable from a cardiovascular stance with well-controlled risk factors.  I advised continuing the present medication regimen.    Follow up: 1 year at his request    Chief Complaint   Patient presents with    Other     F/V Dx: Cerebrovascular accident (CVA) due to thrombosis of cerebral artery (HCC)    Hyperlipidemia     F/V Dx: Mixed hyperlipidemia       History: Gil Hart is a 62 y.o. male with history of CVA, diabetes, hypertension, hyperlipidemia, gastric bypass and NSAID induced GI bleeding presenting for follow-up.  He also has stable CLL.    He has no new cardiovascular symptoms.  He and his wife walk daily.  He keeps up with endocrinology and reports favorable control of diabetes.  Blood pressures been under good control.  Recent LDL cholesterol was less than 50.    He did have concerns about sexual dysfunction with clopidogrel.  He has not wanted to take aspirin.  Given prior history of NSAID induced ulcer.    ROS:   10 point review systems is otherwise negative except as per the HPI    PE:  /82 (BP Location: Left arm, Patient Position: Sitting, BP Cuff Size: Adult)   Pulse 90   Resp 16   Ht 1.88 m (6' 2\")   Wt 85.3 kg (188 lb)   SpO2 97%   BMI 24.14 kg/m²   Gen: no acute distress  HEENT: Symmetric face. Anicteric sclerae. Moist mucus membranes  NECK: No JVD. No lymphadenopathy  CARDIAC: Regular, Normal S1, S2, No murmur  VASCULATURE: carotids are normal bilaterally without bruit  RESP: Clear to auscultation bilaterally  ABD: Soft, non-tender, non-distended  EXT: No edema, no clubbing or cyanosis  SKIN: Warm and dry  NEURO: No gross deficits  PSYCH: Appropriate affect, participates " in conversation    The ASCVD Risk score (Wally LISA, et al., 2019) failed to calculate.    Past Medical History:   Diagnosis Date    Cancer (HCC)     CLL--sees Dr. Moreira    Chronic use of opiate drugs therapeutic purposes 8/19/2017    Diabetes     oral medication     Hemorrhagic disorder (HCC)     on Plavix     Hypertension     NSAID-induced duodenal ulcer 6/26/2017    Obesity (BMI 30-39.9) 9/2/2016    Pain     chronic back pain     Stroke (HCC) 04/2016    no residual problems      Allergies   Allergen Reactions    Pcn [Penicillins] Unspecified     RXN=Childhood allergy     Glipizide Unspecified    Metformin Hcl Unspecified    Penicillamine Unspecified     Outpatient Encounter Medications as of 11/8/2022   Medication Sig Dispense Refill    SYNJARDY XR 12.5-1000 MG TABLET SR 24 HR Take 1 Tablet by mouth 2 times a day.      ferrous sulfate 325 (65 Fe) MG tablet Take 1 Tablet by mouth 2 times a day.      Insulin Aspart, w/Niacinamide, (FIASP INJ) Inject 8 Units as directed in the morning, at noon, and at bedtime.      rosuvastatin (CRESTOR) 20 MG Tab TAKE 1 TABLET BY MOUTH EVERY DAY IN THE EVENING 30 Tablet 0    tizanidine (ZANAFLEX) 4 MG Tab TAKE 1 TABLET BY MOUTH TWICE A  Tablet 1    ondansetron (ZOFRAN) 4 MG Tab tablet TAKE 1 TABLET BY MOUTH EVERY FOUR HOURS AS NEEDED FOR NAUSEA/VOMITING. 90 Tablet 1    clopidogrel (PLAVIX) 75 MG Tab TAKE 1 TABLET BY MOUTH EVERY DAY 90 Tablet 4    tadalafil (CIALIS) 10 MG tablet TAKE ONE TABLET BY MOUTH DAILY AS NEEDED for erectile dysfunction 10 Tablet 0    benazepril (LOTENSIN) 20 MG Tab Take 1 tablet by mouth every day. (Patient taking differently: Take 0.5 Tablets by mouth every day.) 90 tablet 3    Continuous Blood Gluc Sensor (FREESTYLE MARITZA 14 DAY SENSOR) Misc AS DIRECTED 14 DAYS TRANSDERMALLY 28      TRULICITY 4.5 MG/0.5ML Solution Pen-injector INJECT 1 PEN SUBCUTANEOUSLY WEEKLY      pioglitazone (ACTOS) 15 MG Tab Take 1 Tab by mouth every day. 90 Tab 2    Multiple  Vitamin (MULTI-VITAMIN DAILY) Tab Take  by mouth.      acetaminophen (TYLENOL) 500 MG Tab Take 2 Tablets by mouth 1 time a day as needed.      Cholecalciferol (VITAMIN D3) 2000 UNIT Cap Take 1 Capsule by mouth every day.      loratadine (CLARITIN) 10 MG Tab Take 1 Tablet by mouth every day.      Cyanocobalamin (VITAMIN B-12 PO) Take 1 Tab by mouth every day.      [DISCONTINUED] azithromycin (ZITHROMAX) 250 MG Tab TAKE 2 TABLETS BY MOUTH TODAY, THEN TAKE 1 TABLET DAILY FOR 4 DAYS (Patient not taking: Reported on 11/8/2022)      [DISCONTINUED] cephALEXin (KEFLEX) 500 MG Cap  (Patient not taking: Reported on 11/8/2022)      [DISCONTINUED] tadalafil (CIALIS) 10 MG tablet TAKE ONE TABLET BY MOUTH DAILY AS NEEDED for erectile dysfunction (Patient not taking: Reported on 11/8/2022) 10 Tablet 3    [DISCONTINUED] sucralfate (CARAFATE) 1 GM Tab TAKE 1 TABLET BY MOUTH 4 TIMES A DAY,BEFORE MEALS AND AT BEDTIME FOR 20 DAYS AS NEEDED (Patient not taking: Reported on 11/8/2022) 40 Tablet 1    [DISCONTINUED] omeprazole (PRILOSEC) 20 MG delayed-release capsule TAKE 1 CAPSULE BY MOUTH EVERY DAY (Patient not taking: Reported on 11/8/2022) 90 Capsule 3    [DISCONTINUED] sulfamethoxazole-trimethoprim (BACTRIM DS) 800-160 MG tablet 1 tablet (Patient not taking: Reported on 11/8/2022)      [DISCONTINUED] pravastatin (PRAVACHOL) 40 MG tablet 1 tablet (Patient not taking: Reported on 11/8/2022)      Insulin Degludec (TRESIBA FLEXTOUCH) 200 UNIT/ML Solution Pen-injector Inject 20-60 Units as instructed every bedtime. 3 PEN 2     No facility-administered encounter medications on file as of 11/8/2022.     Social History     Socioeconomic History    Marital status:      Spouse name: Not on file    Number of children: Not on file    Years of education: Not on file    Highest education level: Not on file   Occupational History    Not on file   Tobacco Use    Smoking status: Never    Smokeless tobacco: Never   Vaping Use    Vaping Use:  Never used   Substance and Sexual Activity    Alcohol use: No     Comment: Past history of alcoholism--last drink was 2010--attends Recovery Meetings at his [employer]    Drug use: No    Sexual activity: Yes     Partners: Female     Birth control/protection: Post-Menopausal   Other Topics Concern    Not on file   Social History Narrative    Not on file     Social Determinants of Health     Financial Resource Strain: Not on file   Food Insecurity: Not on file   Transportation Needs: Not on file   Physical Activity: Not on file   Stress: Not on file   Social Connections: Not on file   Intimate Partner Violence: Not on file   Housing Stability: Not on file       Studies  Lab Results   Component Value Date/Time    CHOLSTRLTOT 82 (L) 09/21/2020 06:32 AM    LDL 28 09/21/2020 06:32 AM    HDL 43 09/21/2020 06:32 AM    TRIGLYCERIDE 54 09/21/2020 06:32 AM       Lab Results   Component Value Date/Time    SODIUM 140 04/29/2022 07:01 AM    POTASSIUM 5.2 04/29/2022 07:01 AM    CHLORIDE 102 04/29/2022 07:01 AM    CO2 25 04/29/2022 07:01 AM    GLUCOSE 186 (H) 04/29/2022 07:01 AM    BUN 14 04/29/2022 07:01 AM    CREATININE 0.97 04/29/2022 07:01 AM     Lab Results   Component Value Date/Time    ALKPHOSPHAT 103 (H) 04/29/2022 07:01 AM    ASTSGOT 20 04/29/2022 07:01 AM    ALTSGPT 17 04/29/2022 07:01 AM    TBILIRUBIN 0.4 04/29/2022 07:01 AM

## 2022-11-21 DIAGNOSIS — N52.9 ERECTILE DYSFUNCTION, UNSPECIFIED ERECTILE DYSFUNCTION TYPE: ICD-10-CM

## 2022-11-21 RX ORDER — CLOPIDOGREL BISULFATE 75 MG/1
75 TABLET ORAL
Qty: 90 TABLET | Refills: 4 | Status: CANCELLED | OUTPATIENT
Start: 2022-11-21

## 2022-11-22 RX ORDER — CLOPIDOGREL BISULFATE 75 MG/1
75 TABLET ORAL
Qty: 90 TABLET | Refills: 4 | Status: SHIPPED | OUTPATIENT
Start: 2022-11-22 | End: 2024-01-22

## 2022-11-22 RX ORDER — TADALAFIL 10 MG/1
10 TABLET ORAL
Qty: 10 TABLET | Refills: 0 | Status: SHIPPED | OUTPATIENT
Start: 2022-11-22 | End: 2022-12-16

## 2022-12-16 DIAGNOSIS — N52.9 ERECTILE DYSFUNCTION, UNSPECIFIED ERECTILE DYSFUNCTION TYPE: ICD-10-CM

## 2022-12-16 DIAGNOSIS — I10 PRIMARY HYPERTENSION: ICD-10-CM

## 2022-12-16 RX ORDER — BENAZEPRIL HYDROCHLORIDE 20 MG/1
20 TABLET ORAL DAILY
Qty: 90 TABLET | Refills: 3 | Status: SHIPPED | OUTPATIENT
Start: 2022-12-16

## 2022-12-16 RX ORDER — TADALAFIL 10 MG/1
10 TABLET ORAL
Qty: 6 TABLET | Refills: 1 | Status: SHIPPED | OUTPATIENT
Start: 2022-12-16 | End: 2022-12-23 | Stop reason: SDUPTHER

## 2022-12-16 NOTE — TELEPHONE ENCOUNTER
Received request via: Pharmacy    Was the patient seen in the last year in this department? Yes  12/8/2021  Does the patient have an active prescription (recently filled or refills available) for medication(s) requested? No    Does the patient have custodial Plus and need 100 day supply (blood pressure, diabetes and cholesterol meds only)? Patient does not have SCP

## 2022-12-16 NOTE — TELEPHONE ENCOUNTER
Is the patient due for a refill? Yes    Was the patient seen the past year? Yes    Date of last office visit: 11/08/22    Does the patient have an upcoming appointment?  No    Provider to refill:BE    Does the patients insurance require a 100 day supply?  No

## 2022-12-23 DIAGNOSIS — N52.9 ERECTILE DYSFUNCTION, UNSPECIFIED ERECTILE DYSFUNCTION TYPE: ICD-10-CM

## 2022-12-23 RX ORDER — TADALAFIL 10 MG/1
10 TABLET ORAL
Qty: 6 TABLET | Refills: 1 | Status: SHIPPED | OUTPATIENT
Start: 2022-12-23 | End: 2023-06-01

## 2023-01-20 ENCOUNTER — HOSPITAL ENCOUNTER (OUTPATIENT)
Facility: MEDICAL CENTER | Age: 63
End: 2023-01-20
Payer: COMMERCIAL

## 2023-01-20 DIAGNOSIS — E78.2 MIXED HYPERLIPIDEMIA: ICD-10-CM

## 2023-01-20 PROCEDURE — 82272 OCCULT BLD FECES 1-3 TESTS: CPT

## 2023-01-20 RX ORDER — ROSUVASTATIN CALCIUM 20 MG/1
TABLET, COATED ORAL
Qty: 90 TABLET | Refills: 3 | Status: SHIPPED | OUTPATIENT
Start: 2023-01-20 | End: 2024-01-23

## 2023-01-20 NOTE — TELEPHONE ENCOUNTER
Is the patient due for a refill? Yes    Was the patient seen the past year? Yes    Date of last office visit: 11/8/22    Does the patient have an upcoming appointment?  No       Provider to refill: BE     Does the patients insurance require a 100 day supply?  No

## 2023-01-21 ENCOUNTER — HOSPITAL ENCOUNTER (OUTPATIENT)
Facility: MEDICAL CENTER | Age: 63
End: 2023-01-21
Payer: COMMERCIAL

## 2023-01-21 PROCEDURE — 82272 OCCULT BLD FECES 1-3 TESTS: CPT

## 2023-01-26 ENCOUNTER — HOSPITAL ENCOUNTER (OUTPATIENT)
Facility: MEDICAL CENTER | Age: 63
End: 2023-01-26
Payer: COMMERCIAL

## 2023-01-26 PROCEDURE — 82272 OCCULT BLD FECES 1-3 TESTS: CPT

## 2023-01-27 LAB
AMBIGUOUS DTTM AMBI4: NORMAL
HEMOCCULT STL QL: NEGATIVE

## 2023-04-13 RX ORDER — ONDANSETRON 4 MG/1
4 TABLET, FILM COATED ORAL EVERY 4 HOURS PRN
Qty: 20 TABLET | Refills: 1 | Status: SHIPPED | OUTPATIENT
Start: 2023-04-13 | End: 2023-07-14

## 2023-04-13 NOTE — TELEPHONE ENCOUNTER
Received request via: Pharmacy    Was the patient seen in the last year in this department? No    Does the patient have an active prescription (recently filled or refills available) for medication(s) requested? No    Does the patient have intermediate Plus and need 100 day supply (blood pressure, diabetes and cholesterol meds only)? Medication is not for cholesterol, blood pressure or diabetes

## 2023-05-05 ENCOUNTER — HOSPITAL ENCOUNTER (OUTPATIENT)
Facility: MEDICAL CENTER | Age: 63
End: 2023-05-05
Attending: INTERNAL MEDICINE
Payer: COMMERCIAL

## 2023-05-05 LAB
FERRITIN SERPL-MCNC: 77.6 NG/ML (ref 22–322)
IRON SATN MFR SERPL: 36 % (ref 15–55)
IRON SERPL-MCNC: 103 UG/DL (ref 50–180)
LDH SERPL L TO P-CCNC: 133 U/L (ref 107–266)
TIBC SERPL-MCNC: 284 UG/DL (ref 250–450)
UIBC SERPL-MCNC: 181 UG/DL (ref 110–370)

## 2023-05-05 PROCEDURE — 83550 IRON BINDING TEST: CPT

## 2023-05-05 PROCEDURE — 83540 ASSAY OF IRON: CPT

## 2023-05-05 PROCEDURE — 83615 LACTATE (LD) (LDH) ENZYME: CPT

## 2023-05-05 PROCEDURE — 82728 ASSAY OF FERRITIN: CPT

## 2023-07-14 RX ORDER — ONDANSETRON 4 MG/1
4 TABLET, FILM COATED ORAL EVERY 4 HOURS PRN
Qty: 20 TABLET | Refills: 1 | Status: SHIPPED | OUTPATIENT
Start: 2023-07-14 | End: 2023-08-24 | Stop reason: SDUPTHER

## 2023-08-24 RX ORDER — ONDANSETRON 4 MG/1
4 TABLET, FILM COATED ORAL EVERY 4 HOURS PRN
Qty: 20 TABLET | Refills: 1 | Status: SHIPPED | OUTPATIENT
Start: 2023-08-24 | End: 2023-10-04

## 2023-08-24 NOTE — TELEPHONE ENCOUNTER
Received request via: Patient    Was the patient seen in the last year in this department? Yes  LOV: 11/8/2022  Does the patient have an active prescription (recently filled or refills available) for medication(s) requested? No    Does the patient have alf Plus and need 100 day supply (blood pressure, diabetes and cholesterol meds only)? Patient does not have SCP

## 2023-10-04 RX ORDER — ONDANSETRON 4 MG/1
4 TABLET, FILM COATED ORAL EVERY 4 HOURS PRN
Qty: 20 TABLET | Refills: 1 | Status: SHIPPED | OUTPATIENT
Start: 2023-10-04

## 2023-10-04 NOTE — TELEPHONE ENCOUNTER
Received request via: Pharmacy    Was the patient seen in the last year in this department? No. Last seen 12/08/2021    Does the patient have an active prescription (recently filled or refills available) for medication(s) requested? No    Does the patient have halfway Plus and need 100 day supply (blood pressure, diabetes and cholesterol meds only)? Patient does not have SCP

## 2023-12-18 ENCOUNTER — APPOINTMENT (RX ONLY)
Dept: URBAN - METROPOLITAN AREA CLINIC 22 | Facility: CLINIC | Age: 63
Setting detail: DERMATOLOGY
End: 2023-12-18

## 2023-12-18 DIAGNOSIS — D22 MELANOCYTIC NEVI: ICD-10-CM

## 2023-12-18 DIAGNOSIS — Z71.89 OTHER SPECIFIED COUNSELING: ICD-10-CM

## 2023-12-18 DIAGNOSIS — L57.0 ACTINIC KERATOSIS: ICD-10-CM

## 2023-12-18 DIAGNOSIS — L82.1 OTHER SEBORRHEIC KERATOSIS: ICD-10-CM

## 2023-12-18 DIAGNOSIS — H61.03 CHONDRITIS OF EXTERNAL EAR: ICD-10-CM

## 2023-12-18 DIAGNOSIS — L81.4 OTHER MELANIN HYPERPIGMENTATION: ICD-10-CM

## 2023-12-18 DIAGNOSIS — D69.2 OTHER NONTHROMBOCYTOPENIC PURPURA: ICD-10-CM

## 2023-12-18 PROBLEM — H61.032 CHONDRITIS OF LEFT EXTERNAL EAR: Status: ACTIVE | Noted: 2023-12-18

## 2023-12-18 PROBLEM — D22.5 MELANOCYTIC NEVI OF TRUNK: Status: ACTIVE | Noted: 2023-12-18

## 2023-12-18 PROCEDURE — ? SUNSCREEN RECOMMENDATIONS

## 2023-12-18 PROCEDURE — ? COUNSELING

## 2023-12-18 PROCEDURE — ? LIQUID NITROGEN (COSMETIC)

## 2023-12-18 PROCEDURE — 99213 OFFICE O/P EST LOW 20 MIN: CPT | Mod: 25

## 2023-12-18 PROCEDURE — ? LIQUID NITROGEN

## 2023-12-18 PROCEDURE — 17000 DESTRUCT PREMALG LESION: CPT

## 2023-12-18 ASSESSMENT — LOCATION DETAILED DESCRIPTION DERM
LOCATION DETAILED: LEFT SUPERIOR CENTRAL MALAR CHEEK
LOCATION DETAILED: LEFT TRIANGULAR FOSSA
LOCATION DETAILED: LEFT SUPERIOR FOREHEAD
LOCATION DETAILED: INFERIOR MID FOREHEAD
LOCATION DETAILED: LEFT SUPERIOR HELIX
LOCATION DETAILED: RIGHT LATERAL SUPERIOR CHEST
LOCATION DETAILED: INFERIOR THORACIC SPINE
LOCATION DETAILED: RIGHT VENTRAL PROXIMAL FOREARM
LOCATION DETAILED: SUPERIOR THORACIC SPINE
LOCATION DETAILED: RIGHT CLAVICULAR SKIN
LOCATION DETAILED: LEFT VENTRAL PROXIMAL FOREARM

## 2023-12-18 ASSESSMENT — LOCATION SIMPLE DESCRIPTION DERM
LOCATION SIMPLE: RIGHT FOREARM
LOCATION SIMPLE: LEFT FOREARM
LOCATION SIMPLE: UPPER BACK
LOCATION SIMPLE: LEFT CHEEK
LOCATION SIMPLE: RIGHT CLAVICULAR SKIN
LOCATION SIMPLE: LEFT EAR
LOCATION SIMPLE: LEFT FOREHEAD
LOCATION SIMPLE: INFERIOR FOREHEAD
LOCATION SIMPLE: CHEST

## 2023-12-18 ASSESSMENT — LOCATION ZONE DERM
LOCATION ZONE: ARM
LOCATION ZONE: TRUNK
LOCATION ZONE: FACE
LOCATION ZONE: EAR

## 2023-12-18 NOTE — PROCEDURE: SUNSCREEN RECOMMENDATIONS
Products Recommended: Mago Goodman \\nElta md UV clear
Detail Level: Zone
General Sunscreen Counseling: I recommended a broad spectrum sunscreen with a SPF of 30 or higher.  I explained that SPF 30 sunscreens block approximately 97 percent of the sun's harmful rays.  Sunscreens should be applied at least 15 minutes prior to expected sun exposure and then every 2 hours after that as long as sun exposure continues. If swimming or exercising sunscreen should be reapplied every 45 minutes to an hour after getting wet or sweating.  One ounce, or the equivalent of a shot glass full of sunscreen, is adequate to protect the skin not covered by a bathing suit. I also recommended a lip balm with a sunscreen as well. Sun protective clothing can be used in lieu of sunscreen but must be worn the entire time you are exposed to the sun's rays.

## 2023-12-18 NOTE — PROCEDURE: LIQUID NITROGEN (COSMETIC)
Render Post-Care Instructions In Note?: no
Price (Use Numbers Only, No Special Characters Or $): 0
Detail Level: Detailed
Consent: The patient's consent was obtained including but not limited to risks of crusting, scabbing, blistering, scarring, darker or lighter pigmentary change, recurrence, incomplete removal and infection. The patient understands that the procedure is cosmetic in nature and is not covered by insurance.\\nNo charge today
Show Spray Paint Technique Variable?: Yes
Spray Paint Text: The liquid nitrogen was applied to the skin utilizing a spray paint frosting technique.
Billing Information: Bill by Static Price
Post-Care Instructions: I reviewed with the patient in detail post-care instructions. Patient is to wear sunprotection, and avoid picking at any of the treated lesions. Pt may apply Vaseline to crusted or scabbing areas.

## 2024-01-22 DIAGNOSIS — E78.2 MIXED HYPERLIPIDEMIA: ICD-10-CM

## 2024-01-22 RX ORDER — CLOPIDOGREL BISULFATE 75 MG/1
75 TABLET ORAL
Qty: 90 TABLET | Refills: 4 | Status: SHIPPED | OUTPATIENT
Start: 2024-01-22

## 2024-01-23 RX ORDER — ROSUVASTATIN CALCIUM 20 MG/1
TABLET, COATED ORAL
Qty: 90 TABLET | Refills: 0 | Status: SHIPPED | OUTPATIENT
Start: 2024-01-23

## 2024-01-23 NOTE — TELEPHONE ENCOUNTER
Received request via: Pharmacy    Was the patient seen in the last year in this department? No. Last seen 12/08/2021    Does the patient have an active prescription (recently filled or refills available) for medication(s) requested? No    [facility]    Does the patient have [facility] and need 100 day supply (blood pressure, diabetes and cholesterol meds only)? Patient does not have SCP

## 2024-01-23 NOTE — TELEPHONE ENCOUNTER
Is the patient due for a refill? Yes    Was the patient seen the past year? No    Date of last office visit: 11/8/2024    Does the patient have an upcoming appointment?  No    Provider to refill:BE    Does the patients insurance require a 100 day supply?  No

## 2024-03-01 DIAGNOSIS — M54.50 LOW BACK PAIN RADIATING TO RIGHT LEG: ICD-10-CM

## 2024-03-01 DIAGNOSIS — M79.604 LOW BACK PAIN RADIATING TO RIGHT LEG: ICD-10-CM

## 2024-03-01 RX ORDER — TIZANIDINE 4 MG/1
TABLET ORAL
Qty: 180 TABLET | Refills: 3 | Status: SHIPPED | OUTPATIENT
Start: 2024-03-01

## 2024-03-01 NOTE — TELEPHONE ENCOUNTER
Received request via: Pharmacy    Was the patient seen in the last year in this department? Yes    Does the patient have an active prescription (recently filled or refills available) for medication(s) requested? No    Pharmacy Name: [facility]    Does the patient have [facility] Plus and need 100 day supply (blood pressure, diabetes and cholesterol meds only)? Patient does not have SCP

## 2024-03-16 SDOH — ECONOMIC STABILITY: TRANSPORTATION INSECURITY
IN THE PAST 12 MONTHS, HAS LACK OF RELIABLE TRANSPORTATION KEPT YOU FROM MEDICAL APPOINTMENTS, MEETINGS, WORK OR FROM GETTING THINGS NEEDED FOR DAILY LIVING?: NO

## 2024-03-16 SDOH — ECONOMIC STABILITY: TRANSPORTATION INSECURITY
IN THE PAST 12 MONTHS, HAS THE LACK OF TRANSPORTATION KEPT YOU FROM MEDICAL APPOINTMENTS OR FROM GETTING MEDICATIONS?: NO

## 2024-03-16 SDOH — ECONOMIC STABILITY: FOOD INSECURITY: WITHIN THE PAST 12 MONTHS, THE FOOD YOU BOUGHT JUST DIDN'T LAST AND YOU DIDN'T HAVE MONEY TO GET MORE.: NEVER TRUE

## 2024-03-16 SDOH — ECONOMIC STABILITY: TRANSPORTATION INSECURITY
IN THE PAST 12 MONTHS, HAS LACK OF TRANSPORTATION KEPT YOU FROM MEETINGS, WORK, OR FROM GETTING THINGS NEEDED FOR DAILY LIVING?: NO

## 2024-03-16 SDOH — HEALTH STABILITY: PHYSICAL HEALTH: ON AVERAGE, HOW MANY MINUTES DO YOU ENGAGE IN EXERCISE AT THIS LEVEL?: 30 MIN

## 2024-03-16 SDOH — ECONOMIC STABILITY: FOOD INSECURITY: WITHIN THE PAST 12 MONTHS, YOU WORRIED THAT YOUR FOOD WOULD RUN OUT BEFORE YOU GOT MONEY TO BUY MORE.: NEVER TRUE

## 2024-03-16 SDOH — ECONOMIC STABILITY: INCOME INSECURITY: IN THE LAST 12 MONTHS, WAS THERE A TIME WHEN YOU WERE NOT ABLE TO PAY THE MORTGAGE OR RENT ON TIME?: NO

## 2024-03-16 SDOH — ECONOMIC STABILITY: HOUSING INSECURITY
IN THE LAST 12 MONTHS, WAS THERE A TIME WHEN YOU DID NOT HAVE A STEADY PLACE TO SLEEP OR SLEPT IN A SHELTER (INCLUDING NOW)?: NO

## 2024-03-16 SDOH — HEALTH STABILITY: PHYSICAL HEALTH: ON AVERAGE, HOW MANY DAYS PER WEEK DO YOU ENGAGE IN MODERATE TO STRENUOUS EXERCISE (LIKE A BRISK WALK)?: 4 DAYS

## 2024-03-16 SDOH — ECONOMIC STABILITY: HOUSING INSECURITY: IN THE LAST 12 MONTHS, HOW MANY PLACES HAVE YOU LIVED?: 1

## 2024-03-16 SDOH — ECONOMIC STABILITY: INCOME INSECURITY: HOW HARD IS IT FOR YOU TO PAY FOR THE VERY BASICS LIKE FOOD, HOUSING, MEDICAL CARE, AND HEATING?: NOT HARD AT ALL

## 2024-03-16 SDOH — HEALTH STABILITY: MENTAL HEALTH
STRESS IS WHEN SOMEONE FEELS TENSE, NERVOUS, ANXIOUS, OR CAN'T SLEEP AT NIGHT BECAUSE THEIR MIND IS TROUBLED. HOW STRESSED ARE YOU?: TO SOME EXTENT

## 2024-03-16 ASSESSMENT — SOCIAL DETERMINANTS OF HEALTH (SDOH)
HOW OFTEN DO YOU HAVE A DRINK CONTAINING ALCOHOL: NEVER
HOW OFTEN DO YOU GET TOGETHER WITH FRIENDS OR RELATIVES?: ONCE A WEEK
IN A TYPICAL WEEK, HOW MANY TIMES DO YOU TALK ON THE PHONE WITH FAMILY, FRIENDS, OR NEIGHBORS?: NEVER
HOW OFTEN DO YOU ATTENT MEETINGS OF THE CLUB OR ORGANIZATION YOU BELONG TO?: MORE THAN 4 TIMES PER YEAR
HOW OFTEN DO YOU ATTEND CHURCH OR RELIGIOUS SERVICES?: MORE THAN 4 TIMES PER YEAR
HOW OFTEN DO YOU GET TOGETHER WITH FRIENDS OR RELATIVES?: ONCE A WEEK
DO YOU BELONG TO ANY CLUBS OR ORGANIZATIONS SUCH AS CHURCH GROUPS UNIONS, FRATERNAL OR ATHLETIC GROUPS, OR SCHOOL GROUPS?: YES
HOW OFTEN DO YOU ATTENT MEETINGS OF THE CLUB OR ORGANIZATION YOU BELONG TO?: MORE THAN 4 TIMES PER YEAR
IN A TYPICAL WEEK, HOW MANY TIMES DO YOU TALK ON THE PHONE WITH FAMILY, FRIENDS, OR NEIGHBORS?: NEVER
WITHIN THE PAST 12 MONTHS, YOU WORRIED THAT YOUR FOOD WOULD RUN OUT BEFORE YOU GOT THE MONEY TO BUY MORE: NEVER TRUE
HOW MANY DRINKS CONTAINING ALCOHOL DO YOU HAVE ON A TYPICAL DAY WHEN YOU ARE DRINKING: PATIENT DOES NOT DRINK
HOW HARD IS IT FOR YOU TO PAY FOR THE VERY BASICS LIKE FOOD, HOUSING, MEDICAL CARE, AND HEATING?: NOT HARD AT ALL
HOW OFTEN DO YOU ATTEND CHURCH OR RELIGIOUS SERVICES?: MORE THAN 4 TIMES PER YEAR
DO YOU BELONG TO ANY CLUBS OR ORGANIZATIONS SUCH AS CHURCH GROUPS UNIONS, FRATERNAL OR ATHLETIC GROUPS, OR SCHOOL GROUPS?: YES
HOW OFTEN DO YOU HAVE SIX OR MORE DRINKS ON ONE OCCASION: NEVER

## 2024-03-16 ASSESSMENT — LIFESTYLE VARIABLES
HOW OFTEN DO YOU HAVE A DRINK CONTAINING ALCOHOL: NEVER
HOW OFTEN DO YOU HAVE SIX OR MORE DRINKS ON ONE OCCASION: NEVER
HOW MANY STANDARD DRINKS CONTAINING ALCOHOL DO YOU HAVE ON A TYPICAL DAY: PATIENT DOES NOT DRINK
AUDIT-C TOTAL SCORE: 0
SKIP TO QUESTIONS 9-10: 1

## 2024-03-19 ENCOUNTER — OFFICE VISIT (OUTPATIENT)
Dept: MEDICAL GROUP | Facility: LAB | Age: 64
End: 2024-03-19
Payer: COMMERCIAL

## 2024-03-19 VITALS
TEMPERATURE: 97.1 F | WEIGHT: 165.34 LBS | DIASTOLIC BLOOD PRESSURE: 62 MMHG | BODY MASS INDEX: 21.22 KG/M2 | RESPIRATION RATE: 16 BRPM | HEART RATE: 92 BPM | OXYGEN SATURATION: 98 % | SYSTOLIC BLOOD PRESSURE: 128 MMHG | HEIGHT: 74 IN

## 2024-03-19 DIAGNOSIS — I10 PRIMARY HYPERTENSION: ICD-10-CM

## 2024-03-19 DIAGNOSIS — E78.2 MIXED HYPERLIPIDEMIA: ICD-10-CM

## 2024-03-19 DIAGNOSIS — Z00.00 WELLNESS EXAMINATION: ICD-10-CM

## 2024-03-19 DIAGNOSIS — Z79.4 TYPE 2 DIABETES MELLITUS WITHOUT COMPLICATION, WITH LONG-TERM CURRENT USE OF INSULIN (HCC): ICD-10-CM

## 2024-03-19 DIAGNOSIS — E11.9 TYPE 2 DIABETES MELLITUS WITHOUT COMPLICATION, WITH LONG-TERM CURRENT USE OF INSULIN (HCC): ICD-10-CM

## 2024-03-19 DIAGNOSIS — Z11.4 ENCOUNTER FOR SCREENING FOR HIV: ICD-10-CM

## 2024-03-19 DIAGNOSIS — Z11.59 NEED FOR HEPATITIS C SCREENING TEST: ICD-10-CM

## 2024-03-19 DIAGNOSIS — Z12.5 PROSTATE CANCER SCREENING: ICD-10-CM

## 2024-03-19 DIAGNOSIS — Z23 NEED FOR VACCINATION: ICD-10-CM

## 2024-03-19 LAB — RETINAL SCREEN: NEGATIVE

## 2024-03-19 PROCEDURE — 92250 FUNDUS PHOTOGRAPHY W/I&R: CPT | Mod: 26 | Performed by: PHYSICIAN ASSISTANT

## 2024-03-19 PROCEDURE — 3074F SYST BP LT 130 MM HG: CPT | Performed by: PHYSICIAN ASSISTANT

## 2024-03-19 PROCEDURE — 99396 PREV VISIT EST AGE 40-64: CPT | Performed by: PHYSICIAN ASSISTANT

## 2024-03-19 PROCEDURE — 3078F DIAST BP <80 MM HG: CPT | Performed by: PHYSICIAN ASSISTANT

## 2024-03-19 RX ORDER — ZOSTER VACCINE RECOMBINANT, ADJUVANTED 50 MCG/0.5
0.5 KIT INTRAMUSCULAR ONCE
Qty: 1 EACH | Refills: 0 | Status: SHIPPED
Start: 2024-03-19 | End: 2024-03-19

## 2024-03-19 RX ORDER — TIRZEPATIDE 7.5 MG/.5ML
INJECTION, SOLUTION SUBCUTANEOUS
COMMUNITY

## 2024-03-19 ASSESSMENT — PATIENT HEALTH QUESTIONNAIRE - PHQ9: CLINICAL INTERPRETATION OF PHQ2 SCORE: 0

## 2024-03-19 NOTE — PROGRESS NOTES
Subjective:     CC:   Chief Complaint   Patient presents with    Annual Exam     Diabetes eye exam       HPI:   Gil Hart is a 63 y.o. male who presents for an annual exam. He is feeling well and has no complaints.    Type 2 Diabetes  This is a =chronic condition.  Current medications:  Synjardy 12 point 5-1000, 1 tablet twice daily  Actos 50 mg daily, Mounjaro 7.5 mg q. 7 days  Follows with [facility] for management  Last Microalb/Cr ratio: Ordered  Fasting sugars: Using freestyle lokesh sensor  Last diabetic foot exam: Completed today  Last retinal eye exam: Completed today  ACEi/ARB?  Yes  Statin?  Yes  Concomitant HTN?  controlled  Nightly foot checks?  yes      Health Maintenance  Cholesterol Screening: Ordered  Diabetes Screening: See above  Diet: Diabetic diet  Substance Abuse: Denies    Cancer screening  Colorectal Cancer Screening: Due 2029  Prostate Cancer Screening/PSA: ordered today    Infectious disease screening/Immunizations  --STI Screening: Ordered   -- yet intact intactHIV Screening: ordered  --Hepatitis C Screening: ordered  --IHe  has a past medical history of Cancer (HCC), Chronic use of opiate drugs therapeutic purposes (8/19/2017), Diabetes, Hemorrhagic disorder (HCC), Hypertension, NSAID-induced duodenal ulcer (6/26/2017), Obesity (BMI 30-39.9) (9/2/2016), Pain, and Stroke (HCC) (04/2016).  He  has a past surgical history that includes gastric bypass laparoscopic (2001); hand surgery (2006); bone spur excision (2008); laminotomy (2010); fusion, spine, lumbar, plif (2012); celiac plexus neurolysis; gastroscopy-endo (N/A, 5/30/2017); laminotomy (6/6/2018); and spinal cord stimulator (6/6/2018).  Family History   Problem Relation Age of Onset    Alcohol/Drug Mother     Alcohol/Drug Father     Stroke Father     Stroke Sister     GI Disease Sister         cirrhosis    Heart Disease Sister      Social History     Tobacco Use    Smoking status: Never    Smokeless tobacco: Never   Vaping Use     Vaping Use: Never used   Substance Use Topics    Alcohol use: No     Comment: Past history of alcoholism--last drink was 2010--attends Recovery Meetings at his [facility]    Drug use: No       Patient Active Problem List    Diagnosis Date Noted    Cellulitis of left lower extremity 08/31/2021    Hyperglycemia due to type 2 diabetes mellitus (HCC) 08/31/2021    Chronic use of opiate drug for therapeutic purpose 08/19/2017    NSAID-induced duodenal ulcer 06/26/2017    Medical marijuana use 06/26/2017    Normocytic normochromic anemia 05/29/2017    Hyponatremia 05/29/2017    Chronic pancreatitis (HCC) 05/28/2017    Dilated cbd, acquired 05/28/2017    Obesity (BMI 30-39.9) 09/02/2016    Stroke ([facility]) 04/12/2016    Family history of cerebral aneurysm 02/09/2016    Type 2 diabetes mellitus without complication ([facility]) 02/05/2016    CLL (chronic lymphocytic leukemia) ([facility]) 11/09/2015    Low back pain radiating to right leg 12/03/2013    Mixed hyperlipidemia 12/03/2013    Hypertension 12/03/2013    Vitamin D deficiency disease 12/03/2013    Personal history of alcoholism ([facility]) 12/03/2013    History of Amanda-en-Y gastric bypass 12/03/2013    History of chronic pancreatitis 12/03/2013       Current Outpatient Medications   Medication Sig Dispense Refill    MOUNJARO 7.5 MG/0.5ML Solution Pen-injector as directed 7.5mg Subcutaneous oncea week for 90 days      tizanidine (ZANAFLEX) 4 MG Tab TAKE 1 TABLET BY MOUTH TWICE A  Tablet 3    rosuvastatin (CRESTOR) 20 MG Tab TAKE 1 TABLET BY MOUTH EVERY DAY IN THE EVENING 90 Tablet 0    clopidogrel (PLAVIX) 75 MG Tab TAKE 1 TABLET BY MOUTH EVERY DAY 90 Tablet 4    ondansetron (ZOFRAN) 4 MG Tab tablet TAKE 1 TABLET BY MOUTH EVERY FOUR HOURS AS NEEDED FOR NAUSEA/VOMITING. 20 Tablet 1    tadalafil (CIALIS) 10 MG tablet TAKE 1 TABLET BY MOUTH 1 TIME A DAY AS NEEDED FOR ERECTILE DYSFUNCTION 6 Tablet 3    benazepril (LOTENSIN) 20 MG Tab TAKE 1 TABLET BY MOUTH EVERY DAY 90 Tablet 3    SYNJARDY  XR 12.5-1000 MG TABLET SR 24 HR Take 1 Tablet by mouth 2 times a day.      Continuous Blood Gluc Sensor (FREESTYLE MARITZA 14 DAY SENSOR) Misc AS DIRECTED 14 DAYS TRANSDERMALLY 28      pioglitazone (ACTOS) 15 MG Tab Take 1 Tab by mouth every day. 90 Tab 2    Multiple Vitamin (MULTI-VITAMIN DAILY) Tab Take  by mouth.      acetaminophen (TYLENOL) 500 MG Tab Take 2 Tablets by mouth 1 time a day as needed.      Cholecalciferol (VITAMIN D3) 2000 UNIT Cap Take 1 Capsule by mouth every day.      loratadine (CLARITIN) 10 MG Tab Take 1 Tablet by mouth every day.      Cyanocobalamin (VITAMIN B-12 PO) Take 1 Tab by mouth every day.      ferrous sulfate 325 (65 Fe) MG tablet Take 1 Tablet by mouth 2 times a day.      Insulin Aspart, w/Niacinamide, (FIASP INJ) Inject 8 Units as directed in the morning, at noon, and at bedtime.      TRULICITY 4.5 MG/0.5ML Solution Pen-injector INJECT 1 PEN SUBCUTANEOUSLY WEEKLY      Insulin Degludec (TRESIBA FLEXTOUCH) 200 UNIT/ML Solution Pen-injector Inject 20-60 Units as instructed every bedtime. 3 PEN 2     No current facility-administered medications for this visit.    (including changes today)  Allergies: Pcn [penicillins], Glipizide, Metformin hcl, and Penicillamine    Review of Systems   Constitutional: Negative for fever, chills and malaise/fatigue.   HENT: Negative for congestion.    Eyes: Negative for pain.   Respiratory: Negative for cough and shortness of breath.    Cardiovascular: Negative for leg swelling.   Gastrointestinal: Negative for nausea, vomiting, abdominal pain and diarrhea.   Genitourinary: Negative for dysuria and hematuria.   Skin: Negative for rash.   Neurological: Negative for dizziness, focal weakness and headaches.   Endo/Heme/Allergies: Does not bleed easily.   Psychiatric/Behavioral: Negative for depression.  The patient is not nervous/anxious.      Objective:     /62 (BP Location: Right arm, Patient Position: Sitting, BP Cuff Size: Adult)   Pulse 92   Temp  "36.2 °C (97.1 °F) (Temporal)   Resp 16   Ht 1.88 m (6' 2\")   Wt 75 kg (165 lb 5.5 oz)   SpO2 98%   BMI 21.23 kg/m²   Body mass index is 21.23 kg/m².  Wt Readings from Last 4 Encounters:   03/19/24 75 kg (165 lb 5.5 oz)   11/08/22 85.3 kg (188 lb)   12/08/21 84.8 kg (187 lb)   08/31/21 81.3 kg (179 lb 3.2 oz)       Physical Exam:  Constitutional: Well-developed and well-nourished. Not diaphoretic. No distress.   Skin: Skin is warm and dry. No rash noted.  Head: Atraumatic without lesions.  Eyes: Conjunctivae and extraocular motions are normal. Pupils are equal, round, and reactive to light. No scleral icterus.   Ears:  External ears unremarkable. Tympanic membranes clear and intact.  Nose: Nares patent. Septum midline. Turbinates without erythema nor edema. No discharge.   Mouth/Throat: Dentition is intact. Tongue normal. Oropharynx is clear and moist. Posterior pharynx without erythema or exudates.  Neck: Supple, trachea midline. Normal range of motion. No thyromegaly present. No lymphadenopathy--cervical or supraclavicular.  Cardiovascular: Regular rate and rhythm, S1 and S2 without murmur, rubs, or gallops.    Lungs: Effort normal. Clear to auscultation throughout. No adventitious sounds. No CVA tenderness.  Abdomen: Soft, non tender, and without distention. Active bowel sounds in all four quadrants. No rebound, guarding, masses or HSM.  Extremities: No cyanosis, clubbing, erythema, nor edema. Distal pulses intact and symmetric.   Musculoskeletal: All major joints AROM full in all directions without pain.  Neurological: Alert and oriented x 3. DTRs 2+/3 and symmetric. No cranial nerve deficit. 5/5 myotomes. Sensation intact.  Psychiatric:  Behavior, mood, and affect are appropriate.  Diabetic foot exam: No lesions or calluses noted. 2+ pedal pulses. Sensation intact with 8 out of 10 on monofilament test.      Assessment and Plan:     1. Wellness examination  HCM: routine anticipatory guidance.  Labs per " orders.  Vaccinations per orders.  Counseling about diet, supplements, exercise, skin care and safe sex.    2. Type 2 diabetes mellitus without complication, with long-term current use of insulin (HCC)  This is a chronic condition, controlled.  Hemoglobin A1c is under 7.0%.    He is up-to-date with all of  diabetic screenings  ACE inhibitor/ARB for renal protection, and is on a statin for cardiovascular protection.  We will continue the current regimen.  Outpatient Medications Marked as Taking for the 3/19/24 encounter (Office Visit) with Jocelynn Velázquez P.A.-C.   Medication Sig Dispense Refill    MOUNJARO 7.5 MG/0.5ML Solution Pen-injector as directed 7.5mg Subcutaneous oncea week for 90 days      Zoster Vac Recomb Adjuvanted (SHINGRIX) 50 MCG/0.5ML Recon Susp Inject 0.5 mL into the shoulder, thigh, or buttocks one time for 1 dose. 1 Each 0    tizanidine (ZANAFLEX) 4 MG Tab TAKE 1 TABLET BY MOUTH TWICE A  Tablet 3    rosuvastatin (CRESTOR) 20 MG Tab TAKE 1 TABLET BY MOUTH EVERY DAY IN THE EVENING 90 Tablet 0    clopidogrel (PLAVIX) 75 MG Tab TAKE 1 TABLET BY MOUTH EVERY DAY 90 Tablet 4    ondansetron (ZOFRAN) 4 MG Tab tablet TAKE 1 TABLET BY MOUTH EVERY FOUR HOURS AS NEEDED FOR NAUSEA/VOMITING. 20 Tablet 1    tadalafil (CIALIS) 10 MG tablet TAKE 1 TABLET BY MOUTH 1 TIME A DAY AS NEEDED FOR ERECTILE DYSFUNCTION 6 Tablet 3    benazepril (LOTENSIN) 20 MG Tab TAKE 1 TABLET BY MOUTH EVERY DAY 90 Tablet 3    SYNJARDY XR 12.5-1000 MG TABLET SR 24 HR Take 1 Tablet by mouth 2 times a day.      Continuous Blood Gluc Sensor (FREESTYLE MARITZA 14 DAY SENSOR) Misc AS DIRECTED 14 DAYS TRANSDERMALLY 28      pioglitazone (ACTOS) 15 MG Tab Take 1 Tab by mouth every day. 90 Tab 2    Multiple Vitamin (MULTI-VITAMIN DAILY) Tab Take  by mouth.      acetaminophen (TYLENOL) 500 MG Tab Take 2 Tablets by mouth 1 time a day as needed.      Cholecalciferol (VITAMIN D3) 2000 UNIT Cap Take 1 Capsule by mouth every day.      loratadine  (CLARITIN) 10 MG Tab Take 1 Tablet by mouth every day.      Cyanocobalamin (VITAMIN B-12 PO) Take 1 Tab by mouth every day.       - POCT Retinal Eye Exam  - CBC WITH DIFFERENTIAL; Future  - Comp Metabolic Panel; Future  - MICROALBUMIN CREAT RATIO URINE; Future    3. Mixed hyperlipidemia  Chronic condition, well-controlled  Continue rosuvastatin 20 mg nightly  Due for updated cardiac calcium scoring  - Lipid Profile; Future  - CT-CARDIAC SCORING; Future    4. Primary hypertension  This is a chronic condition, controlled.  Blood pressure is at goal under 140/90 in the office today.  We will continue the current regimen.  - TSH WITH REFLEX TO FT4; Future    5. Encounter for screening for HIV  - HIV AG/AB COMBO ASSAY SCREENING; Future    6. Need for hepatitis C screening test  - HEP C VIRUS ANTIBODY; Future    7. Need for vaccination  - Zoster Vac Recomb Adjuvanted (SHINGRIX) 50 MCG/0.5ML Recon Susp; Inject 0.5 mL into the shoulder, thigh, or buttocks one time for 1 dose.  Dispense: 1 Each; Refill: 0    8. Prostate cancer screening  - PROSTATE SPECIFIC AG SCREENING; Future          Follow-up: No follow-ups on file.

## 2024-03-21 ENCOUNTER — APPOINTMENT (OUTPATIENT)
Dept: RADIOLOGY | Facility: MEDICAL CENTER | Age: 64
End: 2024-03-21
Attending: PHYSICIAN ASSISTANT
Payer: COMMERCIAL

## 2024-04-03 ENCOUNTER — HOSPITAL ENCOUNTER (OUTPATIENT)
Dept: RADIOLOGY | Facility: MEDICAL CENTER | Age: 64
End: 2024-04-03
Attending: PHYSICIAN ASSISTANT
Payer: COMMERCIAL

## 2024-04-03 DIAGNOSIS — E78.2 MIXED HYPERLIPIDEMIA: ICD-10-CM

## 2024-04-03 PROCEDURE — 4410556 CT-CARDIAC SCORING (SELF PAY ONLY)

## 2024-04-04 ENCOUNTER — TELEPHONE (OUTPATIENT)
Dept: CARDIOLOGY | Facility: MEDICAL CENTER | Age: 64
End: 2024-04-04
Payer: COMMERCIAL

## 2024-04-04 DIAGNOSIS — R93.1 ELEVATED CORONARY ARTERY CALCIUM SCORE: ICD-10-CM

## 2024-04-04 NOTE — TELEPHONE ENCOUNTER
Phone number called: [phone]     Call outcome: Spoke to patient and discussed cardiac scoring and things that can effect it. He is agreeable to do the stress test and happy to get all the information they can about his health. Stress test ordered. All questions/concerns answered at this time, patient appreciative of information given.

## 2024-04-04 NOTE — TELEPHONE ENCOUNTER
To : (BE patient but out of office) Patient concerned about calcium score result of 1220.7 and is requesting recommendations. Please advise what to tell the patient/any recommendations. Thank you!

## 2024-04-04 NOTE — TELEPHONE ENCOUNTER
BE    Caller: Roseline Hart (wifey)    Topic/issue: MEDICAL ADVICE    Per Lanie;    Robert had a CT CARDIAC SCORING test completed. One value came back elevated:    TOTAL CALCIUM SCORE: 1220.7    Lanie would like a call back to discuss the next step in care. Please advise.    Thank you,  Rodney DYER    Callback Number: [phone]

## 2024-04-10 ENCOUNTER — HOSPITAL ENCOUNTER (OUTPATIENT)
Dept: LAB | Facility: MEDICAL CENTER | Age: 64
End: 2024-04-10
Attending: PHYSICIAN ASSISTANT
Payer: COMMERCIAL

## 2024-04-10 DIAGNOSIS — Z11.59 NEED FOR HEPATITIS C SCREENING TEST: ICD-10-CM

## 2024-04-10 DIAGNOSIS — I10 PRIMARY HYPERTENSION: ICD-10-CM

## 2024-04-10 DIAGNOSIS — Z12.5 PROSTATE CANCER SCREENING: ICD-10-CM

## 2024-04-10 DIAGNOSIS — Z11.4 ENCOUNTER FOR SCREENING FOR HIV: ICD-10-CM

## 2024-04-10 DIAGNOSIS — E78.2 MIXED HYPERLIPIDEMIA: ICD-10-CM

## 2024-04-10 DIAGNOSIS — Z79.4 TYPE 2 DIABETES MELLITUS WITHOUT COMPLICATION, WITH LONG-TERM CURRENT USE OF INSULIN (HCC): ICD-10-CM

## 2024-04-10 DIAGNOSIS — E11.9 TYPE 2 DIABETES MELLITUS WITHOUT COMPLICATION, WITH LONG-TERM CURRENT USE OF INSULIN (HCC): ICD-10-CM

## 2024-04-10 LAB
ALBUMIN SERPL BCP-MCNC: 4.5 G/DL (ref 3.2–4.9)
ALBUMIN/GLOB SERPL: 1.8 G/DL
ALP SERPL-CCNC: 85 U/L (ref 30–99)
ALT SERPL-CCNC: 15 U/L (ref 2–50)
ANION GAP SERPL CALC-SCNC: 12 MMOL/L (ref 7–16)
AST SERPL-CCNC: 19 U/L (ref 12–45)
BASOPHILS # BLD AUTO: 1.7 % (ref 0–1.8)
BASOPHILS # BLD: 0.18 K/UL (ref 0–0.12)
BILIRUB SERPL-MCNC: 0.8 MG/DL (ref 0.1–1.5)
BUN SERPL-MCNC: 20 MG/DL (ref 8–22)
BURR CELLS BLD QL SMEAR: NORMAL
CALCIUM ALBUM COR SERPL-MCNC: 8.6 MG/DL (ref 8.5–10.5)
CALCIUM SERPL-MCNC: 9 MG/DL (ref 8.5–10.5)
CHLORIDE SERPL-SCNC: 104 MMOL/L (ref 96–112)
CHOLEST SERPL-MCNC: 116 MG/DL (ref 100–199)
CO2 SERPL-SCNC: 25 MMOL/L (ref 20–33)
COMMENT NL1176: NORMAL
CREAT SERPL-MCNC: 0.8 MG/DL (ref 0.5–1.4)
CREAT UR-MCNC: 75.29 MG/DL
EOSINOPHIL # BLD AUTO: 0 K/UL (ref 0–0.51)
EOSINOPHIL NFR BLD: 0 % (ref 0–6.9)
ERYTHROCYTE [DISTWIDTH] IN BLOOD BY AUTOMATED COUNT: 45.3 FL (ref 35.9–50)
GFR SERPLBLD CREATININE-BSD FMLA CKD-EPI: 98 ML/MIN/1.73 M 2
GLOBULIN SER CALC-MCNC: 2.5 G/DL (ref 1.9–3.5)
GLUCOSE SERPL-MCNC: 116 MG/DL (ref 65–99)
HCT VFR BLD AUTO: 42 % (ref 42–52)
HCV AB SER QL: NORMAL
HDLC SERPL-MCNC: 49 MG/DL
HGB BLD-MCNC: 13.9 G/DL (ref 14–18)
HIV 1+2 AB+HIV1 P24 AG SERPL QL IA: NORMAL
LDLC SERPL CALC-MCNC: 47 MG/DL
LYMPHOCYTES # BLD AUTO: 6.41 K/UL (ref 1–4.8)
LYMPHOCYTES NFR BLD: 62.2 % (ref 22–41)
MANUAL DIFF BLD: NORMAL
MCH RBC QN AUTO: 30.8 PG (ref 27–33)
MCHC RBC AUTO-ENTMCNC: 33.1 G/DL (ref 32.3–36.5)
MCV RBC AUTO: 93.1 FL (ref 81.4–97.8)
MICROALBUMIN UR-MCNC: 2.2 MG/DL
MICROALBUMIN/CREAT UR: 29 MG/G (ref 0–30)
MONOCYTES # BLD AUTO: 0.18 K/UL (ref 0–0.85)
MONOCYTES NFR BLD AUTO: 1.7 % (ref 0–13.4)
MORPHOLOGY BLD-IMP: NORMAL
NEUTROPHILS # BLD AUTO: 3.54 K/UL (ref 1.82–7.42)
NEUTROPHILS NFR BLD: 34.4 % (ref 44–72)
NRBC # BLD AUTO: 0 K/UL
NRBC BLD-RTO: 0 /100 WBC (ref 0–0.2)
OVALOCYTES BLD QL SMEAR: NORMAL
PLATELET # BLD AUTO: 114 K/UL (ref 164–446)
PLATELET BLD QL SMEAR: NORMAL
PMV BLD AUTO: 10.6 FL (ref 9–12.9)
POIKILOCYTOSIS BLD QL SMEAR: NORMAL
POTASSIUM SERPL-SCNC: 4.3 MMOL/L (ref 3.6–5.5)
PROT SERPL-MCNC: 7 G/DL (ref 6–8.2)
PSA SERPL-MCNC: 1.05 NG/ML (ref 0–4)
RBC # BLD AUTO: 4.51 M/UL (ref 4.7–6.1)
RBC BLD AUTO: PRESENT
SMUDGE CELLS BLD QL SMEAR: NORMAL
SODIUM SERPL-SCNC: 141 MMOL/L (ref 135–145)
TRIGL SERPL-MCNC: 99 MG/DL (ref 0–149)
TSH SERPL DL<=0.005 MIU/L-ACNC: 1.01 UIU/ML (ref 0.38–5.33)
WBC # BLD AUTO: 10.3 K/UL (ref 4.8–10.8)

## 2024-04-10 PROCEDURE — 82043 UR ALBUMIN QUANTITATIVE: CPT

## 2024-04-10 PROCEDURE — 36415 COLL VENOUS BLD VENIPUNCTURE: CPT

## 2024-04-10 PROCEDURE — 84153 ASSAY OF PSA TOTAL: CPT

## 2024-04-10 PROCEDURE — 84443 ASSAY THYROID STIM HORMONE: CPT

## 2024-04-10 PROCEDURE — 85007 BL SMEAR W/DIFF WBC COUNT: CPT

## 2024-04-10 PROCEDURE — 82570 ASSAY OF URINE CREATININE: CPT

## 2024-04-10 PROCEDURE — 80061 LIPID PANEL: CPT

## 2024-04-10 PROCEDURE — 87389 HIV-1 AG W/HIV-1&-2 AB AG IA: CPT

## 2024-04-10 PROCEDURE — 85027 COMPLETE CBC AUTOMATED: CPT

## 2024-04-10 PROCEDURE — 86803 HEPATITIS C AB TEST: CPT

## 2024-04-10 PROCEDURE — 80053 COMPREHEN METABOLIC PANEL: CPT

## 2024-04-16 ENCOUNTER — HOSPITAL ENCOUNTER (OUTPATIENT)
Dept: RADIOLOGY | Facility: MEDICAL CENTER | Age: 64
End: 2024-04-16
Payer: COMMERCIAL

## 2024-04-16 ENCOUNTER — PATIENT MESSAGE (OUTPATIENT)
Dept: CARDIOLOGY | Facility: MEDICAL CENTER | Age: 64
End: 2024-04-16

## 2024-04-16 DIAGNOSIS — R93.1 ELEVATED CORONARY ARTERY CALCIUM SCORE: ICD-10-CM

## 2024-04-16 PROCEDURE — 93018 CV STRESS TEST I&R ONLY: CPT | Performed by: INTERNAL MEDICINE

## 2024-04-16 PROCEDURE — 78452 HT MUSCLE IMAGE SPECT MULT: CPT | Mod: 26 | Performed by: INTERNAL MEDICINE

## 2024-04-16 PROCEDURE — A9502 TC99M TETROFOSMIN: HCPCS

## 2024-04-16 RX ORDER — CAFFEINE CITRATE 20 MG/ML
60 SOLUTION INTRAVENOUS
Status: DISCONTINUED | OUTPATIENT
Start: 2024-04-16 | End: 2024-04-17 | Stop reason: HOSPADM

## 2024-04-16 RX ORDER — REGADENOSON 0.08 MG/ML
INJECTION, SOLUTION INTRAVENOUS
Status: DISPENSED
Start: 2024-04-16 | End: 2024-04-16

## 2024-04-16 RX ORDER — REGADENOSON 0.08 MG/ML
0.4 INJECTION, SOLUTION INTRAVENOUS ONCE
Status: DISCONTINUED | OUTPATIENT
Start: 2024-04-16 | End: 2024-04-17 | Stop reason: HOSPADM

## 2024-04-17 ENCOUNTER — TELEPHONE (OUTPATIENT)
Dept: CARDIOLOGY | Facility: MEDICAL CENTER | Age: 64
End: 2024-04-17
Payer: COMMERCIAL

## 2024-04-17 NOTE — TELEPHONE ENCOUNTER
Phone Number Called: [phone]    Call outcome: Spoke to patient regarding message below.    Message: Called to inform patient of stress test results. Patient denies any chest pain or shortness of breathe at this time. Patient was wondering about calcium score and if he needs to do anything further in regard to this. Patient reports that in 2017 calcium score was 165 and now it is 1220. Patient wanted BE recommendations in regard to this. All questions answered at this time. Advised to call back with any further questions or concerns.     To BE: patient was wondering if any further treatment is warranted since calcium score went from 165-1220. Of note patient cholesterol well managed with LDL of 47. Patient is asymptomatic at this time.

## 2024-04-17 NOTE — TELEPHONE ENCOUNTER
----- Message from JENNIFER Licea sent at 4/17/2024  9:25 AM PDT -----  Equivocal results on his stress test, can you please call the patient and see if he is having any symptoms?

## 2024-04-17 NOTE — TELEPHONE ENCOUNTER
BE    Caller: Gil Hart    Topic/issue: Patient calling about the results of his nuclear stress test. Patient would like a call back to go over the results if possible - states they are confusing to understand. Please advise.     Callback Number: [phone]    Thank you,  Magaly AMAYA

## 2024-04-18 ENCOUNTER — PATIENT MESSAGE (OUTPATIENT)
Dept: CARDIOLOGY | Facility: MEDICAL CENTER | Age: 64
End: 2024-04-18
Payer: COMMERCIAL

## 2024-04-18 NOTE — PATIENT COMMUNICATION
BE: Pt message asking to be called regarding details of test results. OV 8/23/24. Please advise. Thank you!

## 2024-04-19 NOTE — PATIENT COMMUNICATION
Evangelista Rivera M.D.  You15 hours ago (4:03 PM)     The scan is equivocal meaning that there is a very subtle irregularity on the images that is not clearly normal or abnormal.  The possibilities range from an artifact which I favor to a small area of damage to the heart from silent heart attack-felt less likely.  Overall these results are reassuring    Thanks  BE     Evangelista Rivera M.D.  You2 days ago     Cardiac risk factors are well-controlled.  In the absence of symptoms no additional treatment is needed     Recommendations sent to patient via [employer].

## 2024-06-10 ENCOUNTER — PATIENT MESSAGE (OUTPATIENT)
Dept: CARDIOLOGY | Facility: MEDICAL CENTER | Age: 64
End: 2024-06-10
Payer: COMMERCIAL

## 2024-06-10 DIAGNOSIS — I10 PRIMARY HYPERTENSION: ICD-10-CM

## 2024-06-10 DIAGNOSIS — N52.9 ERECTILE DYSFUNCTION, UNSPECIFIED ERECTILE DYSFUNCTION TYPE: ICD-10-CM

## 2024-06-10 RX ORDER — BENAZEPRIL HYDROCHLORIDE 20 MG/1
20 TABLET ORAL DAILY
Qty: 90 TABLET | Refills: 0 | Status: ON HOLD | OUTPATIENT
Start: 2024-06-10 | End: 2024-06-15

## 2024-06-10 RX ORDER — TADALAFIL 10 MG/1
TABLET ORAL
Qty: 6 TABLET | Refills: 3 | Status: SHIPPED | OUTPATIENT
Start: 2024-06-10

## 2024-06-10 NOTE — TELEPHONE ENCOUNTER
Is the patient due for a refill? Yes    Was the patient seen the past year? No    Date of last office visit: 11/8/22    Does the patient have an upcoming appointment?  Yes   If yes, When? 8/23/24    Provider to refill:BE    Does the patients insurance require a 100 day supply?  No

## 2024-06-10 NOTE — TELEPHONE ENCOUNTER
Received request via: Pharmacy    Was the patient seen in the last year in this department? Yes    Does the patient have an active prescription (recently filled or refills available) for medication(s) requested? No    [facility]    Does the patient have [facility] and need 100 day supply (blood pressure, diabetes and cholesterol meds only)? Patient does not have SCP

## 2024-06-12 ENCOUNTER — PATIENT MESSAGE (OUTPATIENT)
Dept: CARDIOLOGY | Facility: MEDICAL CENTER | Age: 64
End: 2024-06-12
Payer: COMMERCIAL

## 2024-06-12 DIAGNOSIS — E78.2 MIXED HYPERLIPIDEMIA: ICD-10-CM

## 2024-06-13 ENCOUNTER — HOSPITAL ENCOUNTER (OUTPATIENT)
Facility: MEDICAL CENTER | Age: 64
End: 2024-06-15
Attending: STUDENT IN AN ORGANIZED HEALTH CARE EDUCATION/TRAINING PROGRAM | Admitting: INTERNAL MEDICINE
Payer: COMMERCIAL

## 2024-06-13 ENCOUNTER — APPOINTMENT (OUTPATIENT)
Dept: RADIOLOGY | Facility: MEDICAL CENTER | Age: 64
End: 2024-06-13
Attending: STUDENT IN AN ORGANIZED HEALTH CARE EDUCATION/TRAINING PROGRAM
Payer: COMMERCIAL

## 2024-06-13 DIAGNOSIS — I10 PRIMARY HYPERTENSION: ICD-10-CM

## 2024-06-13 DIAGNOSIS — G89.29 CHRONIC LEFT-SIDED LOW BACK PAIN WITHOUT SCIATICA: ICD-10-CM

## 2024-06-13 DIAGNOSIS — M54.50 LOW BACK PAIN, UNSPECIFIED BACK PAIN LATERALITY, UNSPECIFIED CHRONICITY, UNSPECIFIED WHETHER SCIATICA PRESENT: ICD-10-CM

## 2024-06-13 DIAGNOSIS — R11.0 NAUSEA: ICD-10-CM

## 2024-06-13 DIAGNOSIS — M54.50 CHRONIC LEFT-SIDED LOW BACK PAIN WITHOUT SCIATICA: ICD-10-CM

## 2024-06-13 LAB
ALBUMIN SERPL BCP-MCNC: 4.7 G/DL (ref 3.2–4.9)
ALBUMIN/GLOB SERPL: 2 G/DL
ALP SERPL-CCNC: 83 U/L (ref 30–99)
ALT SERPL-CCNC: 19 U/L (ref 2–50)
ANION GAP SERPL CALC-SCNC: 13 MMOL/L (ref 7–16)
APPEARANCE UR: CLEAR
AST SERPL-CCNC: 17 U/L (ref 12–45)
BASOPHILS # BLD AUTO: 0 % (ref 0–1.8)
BASOPHILS # BLD: 0 K/UL (ref 0–0.12)
BILIRUB SERPL-MCNC: 0.4 MG/DL (ref 0.1–1.5)
BILIRUB UR QL STRIP.AUTO: NEGATIVE
BUN SERPL-MCNC: 25 MG/DL (ref 8–22)
CALCIUM ALBUM COR SERPL-MCNC: 9 MG/DL (ref 8.5–10.5)
CALCIUM SERPL-MCNC: 9.6 MG/DL (ref 8.4–10.2)
CHLORIDE SERPL-SCNC: 101 MMOL/L (ref 96–112)
CO2 SERPL-SCNC: 25 MMOL/L (ref 20–33)
COLOR UR: YELLOW
CREAT SERPL-MCNC: 1.14 MG/DL (ref 0.5–1.4)
EOSINOPHIL # BLD AUTO: 0 K/UL (ref 0–0.51)
EOSINOPHIL NFR BLD: 0 % (ref 0–6.9)
ERYTHROCYTE [DISTWIDTH] IN BLOOD BY AUTOMATED COUNT: 45.4 FL (ref 35.9–50)
GFR SERPLBLD CREATININE-BSD FMLA CKD-EPI: 71 ML/MIN/1.73 M 2
GLOBULIN SER CALC-MCNC: 2.4 G/DL (ref 1.9–3.5)
GLUCOSE SERPL-MCNC: 268 MG/DL (ref 65–99)
GLUCOSE UR STRIP.AUTO-MCNC: 500 MG/DL
HCT VFR BLD AUTO: 43.1 % (ref 42–52)
HGB BLD-MCNC: 14.3 G/DL (ref 14–18)
KETONES UR STRIP.AUTO-MCNC: NEGATIVE MG/DL
LEUKOCYTE ESTERASE UR QL STRIP.AUTO: NEGATIVE
LYMPHOCYTES # BLD AUTO: 10.97 K/UL (ref 1–4.8)
LYMPHOCYTES NFR BLD: 69 % (ref 22–41)
MANUAL DIFF BLD: NORMAL
MCH RBC QN AUTO: 31.4 PG (ref 27–33)
MCHC RBC AUTO-ENTMCNC: 33.2 G/DL (ref 32.3–36.5)
MCV RBC AUTO: 94.7 FL (ref 81.4–97.8)
MICRO URNS: ABNORMAL
MONOCYTES # BLD AUTO: 0.32 K/UL (ref 0–0.85)
MONOCYTES NFR BLD AUTO: 2 % (ref 0–13.4)
NEUTROPHILS # BLD AUTO: 4.61 K/UL (ref 1.82–7.42)
NEUTROPHILS NFR BLD: 29 % (ref 44–72)
NITRITE UR QL STRIP.AUTO: NEGATIVE
NRBC # BLD AUTO: 0.02 K/UL
NRBC BLD-RTO: 0.1 /100 WBC (ref 0–0.2)
OVALOCYTES BLD QL SMEAR: NORMAL
PH UR STRIP.AUTO: 5.5 [PH] (ref 5–8)
PLATELET # BLD AUTO: 146 K/UL (ref 164–446)
PLATELET BLD QL SMEAR: NORMAL
PMV BLD AUTO: 10.4 FL (ref 9–12.9)
POIKILOCYTOSIS BLD QL SMEAR: NORMAL
POTASSIUM SERPL-SCNC: 4.1 MMOL/L (ref 3.6–5.5)
PROT SERPL-MCNC: 7.1 G/DL (ref 6–8.2)
PROT UR QL STRIP: NEGATIVE MG/DL
RBC # BLD AUTO: 4.55 M/UL (ref 4.7–6.1)
RBC BLD AUTO: PRESENT
RBC UR QL AUTO: NEGATIVE
SMUDGE CELLS BLD QL SMEAR: NORMAL
SODIUM SERPL-SCNC: 139 MMOL/L (ref 135–145)
SP GR UR STRIP.AUTO: 1.02
WBC # BLD AUTO: 15.9 K/UL (ref 4.8–10.8)

## 2024-06-13 PROCEDURE — 99223 1ST HOSP IP/OBS HIGH 75: CPT | Performed by: INTERNAL MEDICINE

## 2024-06-13 PROCEDURE — 80053 COMPREHEN METABOLIC PANEL: CPT

## 2024-06-13 PROCEDURE — 700101 HCHG RX REV CODE 250: Performed by: STUDENT IN AN ORGANIZED HEALTH CARE EDUCATION/TRAINING PROGRAM

## 2024-06-13 PROCEDURE — G0378 HOSPITAL OBSERVATION PER HR: HCPCS

## 2024-06-13 PROCEDURE — 700102 HCHG RX REV CODE 250 W/ 637 OVERRIDE(OP): Performed by: STUDENT IN AN ORGANIZED HEALTH CARE EDUCATION/TRAINING PROGRAM

## 2024-06-13 PROCEDURE — 96375 TX/PRO/DX INJ NEW DRUG ADDON: CPT

## 2024-06-13 PROCEDURE — 85027 COMPLETE CBC AUTOMATED: CPT

## 2024-06-13 PROCEDURE — 700105 HCHG RX REV CODE 258: Performed by: STUDENT IN AN ORGANIZED HEALTH CARE EDUCATION/TRAINING PROGRAM

## 2024-06-13 PROCEDURE — 700105 HCHG RX REV CODE 258: Performed by: INTERNAL MEDICINE

## 2024-06-13 PROCEDURE — 96374 THER/PROPH/DIAG INJ IV PUSH: CPT

## 2024-06-13 PROCEDURE — 81003 URINALYSIS AUTO W/O SCOPE: CPT

## 2024-06-13 PROCEDURE — 85007 BL SMEAR W/DIFF WBC COUNT: CPT

## 2024-06-13 PROCEDURE — 72131 CT LUMBAR SPINE W/O DYE: CPT

## 2024-06-13 PROCEDURE — 36415 COLL VENOUS BLD VENIPUNCTURE: CPT

## 2024-06-13 PROCEDURE — 99285 EMERGENCY DEPT VISIT HI MDM: CPT

## 2024-06-13 PROCEDURE — 700111 HCHG RX REV CODE 636 W/ 250 OVERRIDE (IP): Performed by: STUDENT IN AN ORGANIZED HEALTH CARE EDUCATION/TRAINING PROGRAM

## 2024-06-13 PROCEDURE — A9270 NON-COVERED ITEM OR SERVICE: HCPCS | Performed by: STUDENT IN AN ORGANIZED HEALTH CARE EDUCATION/TRAINING PROGRAM

## 2024-06-13 RX ORDER — ACETAMINOPHEN 325 MG/1
650 TABLET ORAL EVERY 6 HOURS PRN
Status: DISCONTINUED | OUTPATIENT
Start: 2024-06-13 | End: 2024-06-15 | Stop reason: HOSPADM

## 2024-06-13 RX ORDER — BENAZEPRIL HYDROCHLORIDE 10 MG/1
20 TABLET ORAL DAILY
Status: DISCONTINUED | OUTPATIENT
Start: 2024-06-14 | End: 2024-06-15 | Stop reason: HOSPADM

## 2024-06-13 RX ORDER — MORPHINE SULFATE 4 MG/ML
4 INJECTION INTRAVENOUS
Status: DISCONTINUED | OUTPATIENT
Start: 2024-06-13 | End: 2024-06-14

## 2024-06-13 RX ORDER — SODIUM CHLORIDE 9 MG/ML
INJECTION, SOLUTION INTRAVENOUS CONTINUOUS
Status: DISCONTINUED | OUTPATIENT
Start: 2024-06-13 | End: 2024-06-15 | Stop reason: HOSPADM

## 2024-06-13 RX ORDER — ROSUVASTATIN CALCIUM 20 MG/1
20 TABLET, COATED ORAL EVERY EVENING
Qty: 90 TABLET | Refills: 0 | OUTPATIENT
Start: 2024-06-13

## 2024-06-13 RX ORDER — METHOCARBAMOL 500 MG/1
500 TABLET, FILM COATED ORAL 4 TIMES DAILY
Status: DISCONTINUED | OUTPATIENT
Start: 2024-06-14 | End: 2024-06-15 | Stop reason: HOSPADM

## 2024-06-13 RX ORDER — AMOXICILLIN 250 MG
2 CAPSULE ORAL EVERY EVENING
Status: DISCONTINUED | OUTPATIENT
Start: 2024-06-14 | End: 2024-06-15 | Stop reason: HOSPADM

## 2024-06-13 RX ORDER — LABETALOL HYDROCHLORIDE 5 MG/ML
10 INJECTION, SOLUTION INTRAVENOUS EVERY 4 HOURS PRN
Status: DISCONTINUED | OUTPATIENT
Start: 2024-06-13 | End: 2024-06-15 | Stop reason: HOSPADM

## 2024-06-13 RX ORDER — ONDANSETRON 2 MG/ML
4 INJECTION INTRAMUSCULAR; INTRAVENOUS EVERY 4 HOURS PRN
Status: DISCONTINUED | OUTPATIENT
Start: 2024-06-13 | End: 2024-06-15 | Stop reason: HOSPADM

## 2024-06-13 RX ORDER — ROSUVASTATIN CALCIUM 20 MG/1
20 TABLET, COATED ORAL EVERY EVENING
Qty: 90 TABLET | Refills: 0 | Status: SHIPPED | OUTPATIENT
Start: 2024-06-13

## 2024-06-13 RX ORDER — TIZANIDINE 4 MG/1
4 TABLET ORAL 2 TIMES DAILY
Status: DISCONTINUED | OUTPATIENT
Start: 2024-06-14 | End: 2024-06-15 | Stop reason: HOSPADM

## 2024-06-13 RX ORDER — LIDOCAINE 4 G/G
1 PATCH TOPICAL EVERY 24 HOURS
Status: DISCONTINUED | OUTPATIENT
Start: 2024-06-13 | End: 2024-06-15 | Stop reason: HOSPADM

## 2024-06-13 RX ORDER — ROSUVASTATIN CALCIUM 10 MG/1
20 TABLET, COATED ORAL EVERY EVENING
Status: DISCONTINUED | OUTPATIENT
Start: 2024-06-14 | End: 2024-06-15 | Stop reason: HOSPADM

## 2024-06-13 RX ORDER — CLOPIDOGREL BISULFATE 75 MG/1
75 TABLET ORAL DAILY
Status: DISCONTINUED | OUTPATIENT
Start: 2024-06-14 | End: 2024-06-15 | Stop reason: HOSPADM

## 2024-06-13 RX ORDER — POLYETHYLENE GLYCOL 3350 17 G/17G
1 POWDER, FOR SOLUTION ORAL
Status: DISCONTINUED | OUTPATIENT
Start: 2024-06-13 | End: 2024-06-15 | Stop reason: HOSPADM

## 2024-06-13 RX ORDER — ACETAMINOPHEN 10 MG/ML
1000 INJECTION, SOLUTION INTRAVENOUS ONCE
Status: COMPLETED | OUTPATIENT
Start: 2024-06-13 | End: 2024-06-13

## 2024-06-13 RX ORDER — GABAPENTIN 300 MG/1
300 CAPSULE ORAL ONCE
Status: COMPLETED | OUTPATIENT
Start: 2024-06-13 | End: 2024-06-13

## 2024-06-13 RX ORDER — DIAZEPAM 5 MG/ML
2.5 INJECTION, SOLUTION INTRAMUSCULAR; INTRAVENOUS ONCE
Status: COMPLETED | OUTPATIENT
Start: 2024-06-13 | End: 2024-06-13

## 2024-06-13 RX ORDER — OXYCODONE HYDROCHLORIDE 5 MG/1
5 TABLET ORAL
Status: DISCONTINUED | OUTPATIENT
Start: 2024-06-13 | End: 2024-06-15 | Stop reason: HOSPADM

## 2024-06-13 RX ORDER — OXYCODONE HYDROCHLORIDE 10 MG/1
10 TABLET ORAL
Status: DISCONTINUED | OUTPATIENT
Start: 2024-06-13 | End: 2024-06-15 | Stop reason: HOSPADM

## 2024-06-13 RX ORDER — ONDANSETRON 4 MG/1
4 TABLET, ORALLY DISINTEGRATING ORAL EVERY 4 HOURS PRN
Status: DISCONTINUED | OUTPATIENT
Start: 2024-06-13 | End: 2024-06-15 | Stop reason: HOSPADM

## 2024-06-13 RX ADMIN — ACETAMINOPHEN 1000 MG: 1000 INJECTION INTRAVENOUS at 21:42

## 2024-06-13 RX ADMIN — GABAPENTIN 300 MG: 300 CAPSULE ORAL at 21:42

## 2024-06-13 RX ADMIN — KETAMINE HYDROCHLORIDE 25 MG: 10 INJECTION INTRAMUSCULAR; INTRAVENOUS at 23:34

## 2024-06-13 RX ADMIN — DIAZEPAM 2.5 MG: 10 INJECTION, SOLUTION INTRAMUSCULAR; INTRAVENOUS at 21:42

## 2024-06-13 RX ADMIN — LIDOCAINE 1 PATCH: 4 PATCH TOPICAL at 21:43

## 2024-06-13 RX ADMIN — SODIUM CHLORIDE: 9 INJECTION, SOLUTION INTRAVENOUS at 23:53

## 2024-06-13 RX ADMIN — FENTANYL CITRATE 100 MCG: 50 INJECTION, SOLUTION INTRAMUSCULAR; INTRAVENOUS at 21:42

## 2024-06-13 ASSESSMENT — ENCOUNTER SYMPTOMS
SHORTNESS OF BREATH: 0
CONSTIPATION: 0
TINGLING: 0
FEVER: 0
SPUTUM PRODUCTION: 0
NAUSEA: 0
PALPITATIONS: 0
BACK PAIN: 1
SENSORY CHANGE: 0
DIZZINESS: 0
CHILLS: 0
COUGH: 0
VOMITING: 0
WEAKNESS: 0
LOSS OF CONSCIOUSNESS: 0
DEPRESSION: 0
ABDOMINAL PAIN: 0
HEADACHES: 0
MYALGIAS: 0
FALLS: 0
STRIDOR: 0
DIARRHEA: 0
FOCAL WEAKNESS: 0

## 2024-06-13 ASSESSMENT — PAIN DESCRIPTION - PAIN TYPE
TYPE: ACUTE PAIN

## 2024-06-13 ASSESSMENT — PATIENT HEALTH QUESTIONNAIRE - PHQ9
SUM OF ALL RESPONSES TO PHQ9 QUESTIONS 1 AND 2: 0
1. LITTLE INTEREST OR PLEASURE IN DOING THINGS: NOT AT ALL
2. FEELING DOWN, DEPRESSED, IRRITABLE, OR HOPELESS: NOT AT ALL

## 2024-06-13 ASSESSMENT — FIBROSIS 4 INDEX: FIB4 SCORE: 2.8

## 2024-06-13 NOTE — TELEPHONE ENCOUNTER
Denied refill request due to active rx at requested pharmacy, possible duplication, and/or patient needs appointment for further refills and or discontinuation.    left upper arm

## 2024-06-14 ENCOUNTER — APPOINTMENT (OUTPATIENT)
Dept: RADIOLOGY | Facility: MEDICAL CENTER | Age: 64
End: 2024-06-14
Attending: INTERNAL MEDICINE
Payer: COMMERCIAL

## 2024-06-14 PROBLEM — D72.829 LEUKOCYTOSIS: Status: ACTIVE | Noted: 2024-06-14

## 2024-06-14 LAB
ANION GAP SERPL CALC-SCNC: 11 MMOL/L (ref 7–16)
BUN SERPL-MCNC: 23 MG/DL (ref 8–22)
CALCIUM SERPL-MCNC: 8.6 MG/DL (ref 8.4–10.2)
CHLORIDE SERPL-SCNC: 103 MMOL/L (ref 96–112)
CO2 SERPL-SCNC: 25 MMOL/L (ref 20–33)
CREAT SERPL-MCNC: 0.89 MG/DL (ref 0.5–1.4)
ERYTHROCYTE [DISTWIDTH] IN BLOOD BY AUTOMATED COUNT: 45.1 FL (ref 35.9–50)
GFR SERPLBLD CREATININE-BSD FMLA CKD-EPI: 95 ML/MIN/1.73 M 2
GLUCOSE BLD STRIP.AUTO-MCNC: 168 MG/DL (ref 65–99)
GLUCOSE SERPL-MCNC: 132 MG/DL (ref 65–99)
HCT VFR BLD AUTO: 39 % (ref 42–52)
HGB BLD-MCNC: 12.9 G/DL (ref 14–18)
MCH RBC QN AUTO: 31.1 PG (ref 27–33)
MCHC RBC AUTO-ENTMCNC: 33.1 G/DL (ref 32.3–36.5)
MCV RBC AUTO: 94 FL (ref 81.4–97.8)
PLATELET # BLD AUTO: 109 K/UL (ref 164–446)
PMV BLD AUTO: 10.5 FL (ref 9–12.9)
POTASSIUM SERPL-SCNC: 3.5 MMOL/L (ref 3.6–5.5)
RBC # BLD AUTO: 4.15 M/UL (ref 4.7–6.1)
SODIUM SERPL-SCNC: 139 MMOL/L (ref 135–145)
WBC # BLD AUTO: 11.7 K/UL (ref 4.8–10.8)

## 2024-06-14 PROCEDURE — A9270 NON-COVERED ITEM OR SERVICE: HCPCS | Performed by: INTERNAL MEDICINE

## 2024-06-14 PROCEDURE — 97535 SELF CARE MNGMENT TRAINING: CPT

## 2024-06-14 PROCEDURE — 85027 COMPLETE CBC AUTOMATED: CPT

## 2024-06-14 PROCEDURE — 36415 COLL VENOUS BLD VENIPUNCTURE: CPT

## 2024-06-14 PROCEDURE — 80048 BASIC METABOLIC PNL TOTAL CA: CPT

## 2024-06-14 PROCEDURE — 97161 PT EVAL LOW COMPLEX 20 MIN: CPT

## 2024-06-14 PROCEDURE — 700102 HCHG RX REV CODE 250 W/ 637 OVERRIDE(OP): Performed by: INTERNAL MEDICINE

## 2024-06-14 PROCEDURE — 99233 SBSQ HOSP IP/OBS HIGH 50: CPT | Performed by: INTERNAL MEDICINE

## 2024-06-14 PROCEDURE — 96375 TX/PRO/DX INJ NEW DRUG ADDON: CPT

## 2024-06-14 PROCEDURE — 97530 THERAPEUTIC ACTIVITIES: CPT

## 2024-06-14 PROCEDURE — G0378 HOSPITAL OBSERVATION PER HR: HCPCS

## 2024-06-14 PROCEDURE — 82962 GLUCOSE BLOOD TEST: CPT

## 2024-06-14 PROCEDURE — 700111 HCHG RX REV CODE 636 W/ 250 OVERRIDE (IP): Mod: JZ | Performed by: INTERNAL MEDICINE

## 2024-06-14 PROCEDURE — 72148 MRI LUMBAR SPINE W/O DYE: CPT

## 2024-06-14 PROCEDURE — 700105 HCHG RX REV CODE 258: Performed by: INTERNAL MEDICINE

## 2024-06-14 RX ORDER — PIOGLITAZONEHYDROCHLORIDE 30 MG/1
30 TABLET ORAL DAILY
COMMUNITY

## 2024-06-14 RX ADMIN — OXYCODONE HYDROCHLORIDE 10 MG: 10 TABLET ORAL at 16:12

## 2024-06-14 RX ADMIN — BENAZEPRIL HYDROCHLORIDE 20 MG: 10 TABLET ORAL at 05:08

## 2024-06-14 RX ADMIN — CLOPIDOGREL BISULFATE 75 MG: 75 TABLET ORAL at 05:08

## 2024-06-14 RX ADMIN — SODIUM CHLORIDE: 9 INJECTION, SOLUTION INTRAVENOUS at 16:16

## 2024-06-14 RX ADMIN — OXYCODONE HYDROCHLORIDE 10 MG: 10 TABLET ORAL at 11:20

## 2024-06-14 RX ADMIN — OXYCODONE HYDROCHLORIDE 10 MG: 10 TABLET ORAL at 20:57

## 2024-06-14 RX ADMIN — OXYCODONE HYDROCHLORIDE 10 MG: 10 TABLET ORAL at 04:08

## 2024-06-14 RX ADMIN — METHOCARBAMOL 500 MG: 500 TABLET ORAL at 20:57

## 2024-06-14 RX ADMIN — TIZANIDINE 4 MG: 4 TABLET ORAL at 05:08

## 2024-06-14 RX ADMIN — TIZANIDINE 4 MG: 4 TABLET ORAL at 20:57

## 2024-06-14 RX ADMIN — ROSUVASTATIN 20 MG: 10 TABLET, FILM COATED ORAL at 21:32

## 2024-06-14 RX ADMIN — OXYCODONE HYDROCHLORIDE 10 MG: 10 TABLET ORAL at 07:49

## 2024-06-14 RX ADMIN — METHOCARBAMOL 500 MG: 500 TABLET ORAL at 14:49

## 2024-06-14 RX ADMIN — METHOCARBAMOL 500 MG: 500 TABLET ORAL at 09:33

## 2024-06-14 RX ADMIN — MORPHINE SULFATE 4 MG: 4 INJECTION, SOLUTION INTRAMUSCULAR; INTRAVENOUS at 05:08

## 2024-06-14 RX ADMIN — OXYCODONE HYDROCHLORIDE 10 MG: 10 TABLET ORAL at 01:02

## 2024-06-14 RX ADMIN — LABETALOL HYDROCHLORIDE 10 MG: 5 INJECTION, SOLUTION INTRAVENOUS at 00:53

## 2024-06-14 RX ADMIN — SENNOSIDES AND DOCUSATE SODIUM 2 TABLET: 50; 8.6 TABLET ORAL at 21:32

## 2024-06-14 ASSESSMENT — LIFESTYLE VARIABLES
EVER FELT BAD OR GUILTY ABOUT YOUR DRINKING: NO
TOTAL SCORE: 0
TOTAL SCORE: 0
EVER HAD A DRINK FIRST THING IN THE MORNING TO STEADY YOUR NERVES TO GET RID OF A HANGOVER: NO
HOW MANY TIMES IN THE PAST YEAR HAVE YOU HAD 5 OR MORE DRINKS IN A DAY: 0
HAVE YOU EVER FELT YOU SHOULD CUT DOWN ON YOUR DRINKING: NO
ALCOHOL_USE: NO
DOES PATIENT WANT TO STOP DRINKING: NO
CONSUMPTION TOTAL: NEGATIVE
TOTAL SCORE: 0
ON A TYPICAL DAY WHEN YOU DRINK ALCOHOL HOW MANY DRINKS DO YOU HAVE: 0
HAVE PEOPLE ANNOYED YOU BY CRITICIZING YOUR DRINKING: NO
AVERAGE NUMBER OF DAYS PER WEEK YOU HAVE A DRINK CONTAINING ALCOHOL: 0

## 2024-06-14 ASSESSMENT — COGNITIVE AND FUNCTIONAL STATUS - GENERAL
SUGGESTED CMS G CODE MODIFIER DAILY ACTIVITY: CI
DAILY ACTIVITIY SCORE: 23
MOBILITY SCORE: 24
MOBILITY SCORE: 24
DRESSING REGULAR LOWER BODY CLOTHING: A LITTLE
SUGGESTED CMS G CODE MODIFIER MOBILITY: CH
SUGGESTED CMS G CODE MODIFIER MOBILITY: CH

## 2024-06-14 ASSESSMENT — PAIN DESCRIPTION - PAIN TYPE
TYPE: ACUTE PAIN;CHRONIC PAIN
TYPE: ACUTE PAIN
TYPE: ACUTE PAIN;CHRONIC PAIN
TYPE: ACUTE PAIN
TYPE: ACUTE PAIN;CHRONIC PAIN
TYPE: ACUTE PAIN
TYPE: ACUTE PAIN
TYPE: ACUTE PAIN;CHRONIC PAIN
TYPE: ACUTE PAIN
TYPE: ACUTE PAIN

## 2024-06-14 ASSESSMENT — SOCIAL DETERMINANTS OF HEALTH (SDOH)

## 2024-06-14 ASSESSMENT — GAIT ASSESSMENTS
DISTANCE (FEET): 150
GAIT LEVEL OF ASSIST: INDEPENDENT
DEVIATION: BRADYKINETIC;OTHER (COMMENT)

## 2024-06-14 ASSESSMENT — PATIENT HEALTH QUESTIONNAIRE - PHQ9
SUM OF ALL RESPONSES TO PHQ9 QUESTIONS 1 AND 2: 0
2. FEELING DOWN, DEPRESSED, IRRITABLE, OR HOPELESS: NOT AT ALL
1. LITTLE INTEREST OR PLEASURE IN DOING THINGS: NOT AT ALL

## 2024-06-14 ASSESSMENT — ENCOUNTER SYMPTOMS: BACK PAIN: 1

## 2024-06-14 ASSESSMENT — FIBROSIS 4 INDEX: FIB4 SCORE: 2.33

## 2024-06-14 NOTE — CARE PLAN
The patient is Stable - Low risk of patient condition declining or worsening    Shift Goals  Clinical Goals: Pt without falls and injury; Pt pain reported tolerable after ordered interventions, 4/10 or less.  Patient Goals: Pain control, updates, MRI  Family Goals: BRANDIE    Progress made toward(s) clinical / shift goals:  Pt without falls and injury; Pt pain reported tolerable after ordered interventions, 4/10 or less.    Patient is not progressing towards the following goals:

## 2024-06-14 NOTE — ED PROVIDER NOTES
ED Provider Note    CHIEF COMPLAINT  Chief Complaint   Patient presents with    Back Pain     Low back pain for 2 weeks, Denies recent injury. Was seen at [facility] and placed on steroids with no relief. HX of spinal cord stimulator and hardware in back. Denies bowel or bladder issues.        EXTERNAL RECORDS REVIEWED  Reviewed outpatient note by KAITLIN Driver    HPI/ROMEO  LIMITATION TO HISTORY   Select: : None  OUTSIDE HISTORIAN(S):  Wife providing clinically relevant collateral history    Gil Hart is a 64 y.o. male with past medical history of L3 S1 fusion approximately 12 years ago after traumatic injury, spinal cord stimulator implantation by Dr. Sam, ([employer]) presenting to the emergency department for an exacerbation of his low back pain.  Patient noticed that over the last 2 weeks he had worsening low back pain with radiation to the left [place].  No numbness weakness tingling in the lower extremities.  No  perineal numbness.  No loss of continence of bowel or bladder function.  He went to [facility] was evaluated by KAITLIN Driver several days ago, given a Medrol Dosepak.  Has been taking it for the last 2 days and has not seem to have touched his pain and is noticed worsening in his pain over the last several days.  He is accompanied by his wife    Patient takes Tylenol but does not taken any other pain medication.  Has a history of gastric bypass, not able to take any NSAIDs.    PAST MEDICAL HISTORY   has a past medical history of Cancer ([facility]), Chronic use of opiate drugs therapeutic purposes (8/19/2017), Diabetes, Hemorrhagic disorder ([facility]), Hypertension, NSAID-induced duodenal ulcer (6/26/2017), Obesity (BMI 30-39.9) (9/2/2016), Pain, and Stroke ([facility]) (04/2016).    SURGICAL HISTORY   has a past surgical history that includes gastric bypass laparoscopic (01/01/2001); hand surgery (01/01/2006); bone spur excision (01/01/2008); laminotomy (01/01/2010); fusion, spine,  "lumbar, plif (01/01/2012); celiac plexus neurolysis; gastroscopy-endo (N/A, 05/30/2017); laminotomy (06/06/2018); spinal cord stimulator (06/06/2018); and vasectomy.    FAMILY HISTORY  Family History   Problem Relation Age of Onset    Alcohol/Drug Mother     Heart Attack Father     Stroke Sister     GI Disease Sister         cirrhosis    Heart Disease Sister        SOCIAL HISTORY  Social History     Tobacco Use    Smoking status: Never    Smokeless tobacco: Never   Vaping Use    Vaping status: Never Used   Substance and Sexual Activity    Alcohol use: No     Comment: Past history of alcoholism--last drink was 2010--attends Recovery Meetings at his [facility]    Drug use: No    Sexual activity: Yes     Partners: Female     Birth control/protection: None       CURRENT MEDICATIONS  Home Medications    **Home medications have not yet been reviewed for this encounter**       Audit from Redirected Encounters    **Home medications have not yet been reviewed for this encounter**         ALLERGIES  Allergies   Allergen Reactions    Pcn [Penicillins] Unspecified     RXN=Childhood allergy     Glipizide Unspecified    Metformin Hcl Unspecified    Penicillamine Unspecified       PHYSICAL EXAM  VITAL SIGNS: BP (!) 199/107   Pulse 77   Temp 36.2 °C (97.1 °F) (Temporal)   Resp 14   Ht 1.88 m (6' 2\")   Wt 76.1 kg (167 lb 12.3 oz)   SpO2 98%   BMI 21.54 kg/m²    General: Well- appearing , non-toxic, no acute distress  Neuro: oriented x 3, moving all extremities.   HEENT:   - Head: Normocephalic, atraumatic  - Eyes: PERRL  - Ears/Nose: normal external nose and ears  - Mouth: moist mucosal membranes  Resp: clear to auscultation, no increased work of breathing  CV: Regular rate and rhythm  Abd: Soft, non-tender, non-distended  : No CVA tenderness to percussion.  Back: Tenderness palpation along the midline lumbar spine, no step-offs.  There is well-healed surgical scar midline lumbar spine.  Palpable spinal stimulator in the right " posterior back.  Extremities: No peripheral edema.  5/5 strength in dorsiflexion plantarflexion of the great toe, foot about the ankle.  Sensation intact to light touch in all aspects of both feet.   Psych: lucid and conversational         DIAGNOSTIC STUDIES / PROCEDURES    EKG  My independent EKG interpretation:  Results for orders placed or performed in visit on 22   EKG   Result Value Ref Range    Report       [facility]    Test Date:  2022  Pt Name:    YOLANDA RODRIGUEZ                Department: [facility]  MRN:        [id number]                      Room:  Gender:     Male                         Technician:   :        1960                   Requested By:EVANGELISTA CASTAÑEDA  Order #:    [id number]                    Reading MD: Evangelista Castañeda MD    Measurements  Intervals                                Axis  Rate:       87                           P:          79  SC:         190                          QRS:        78  QRSD:       105                          T:          60  QT:         362  QTc:        436    Interpretive Statements  Sinus rhythm  Electronically Signed On 3-8-2022 12:15:49 PST by Evangelista Castañeda MD         LABS  Results for orders placed or performed during the hospital encounter of 24   CBC WITH DIFFERENTIAL   Result Value Ref Range    WBC 15.9 (H) 4.8 - 10.8 K/uL    RBC 4.55 (L) 4.70 - 6.10 M/uL    Hemoglobin 14.3 14.0 - 18.0 g/dL    Hematocrit 43.1 42.0 - 52.0 %    MCV 94.7 81.4 - 97.8 fL    MCH 31.4 27.0 - 33.0 pg    MCHC 33.2 32.3 - 36.5 g/dL    RDW 45.4 35.9 - 50.0 fL    Platelet Count 146 (L) 164 - 446 K/uL    MPV 10.4 9.0 - 12.9 fL    Neutrophils-Polys 29.00 (L) 44.00 - 72.00 %    Lymphocytes 69.00 (H) 22.00 - 41.00 %    Monocytes 2.00 0.00 - 13.40 %    Eosinophils 0.00 0.00 - 6.90 %    Basophils 0.00 0.00 - 1.80 %    Nucleated RBC 0.10 0.00 - 0.20 /100 WBC    Neutrophils (Absolute) 4.61 1.82 - 7.42 K/uL    Lymphs (Absolute) 10.97 (H) 1.00 - 4.80 K/uL     Monos (Absolute) 0.32 0.00 - 0.85 K/uL    Eos (Absolute) 0.00 0.00 - 0.51 K/uL    Baso (Absolute) 0.00 0.00 - 0.12 K/uL    NRBC (Absolute) 0.02 K/uL   COMP METABOLIC PANEL   Result Value Ref Range    Sodium 139 135 - 145 mmol/L    Potassium 4.1 3.6 - 5.5 mmol/L    Chloride 101 96 - 112 mmol/L    Co2 25 20 - 33 mmol/L    Anion Gap 13.0 7.0 - 16.0    Glucose 268 (H) 65 - 99 mg/dL    Bun 25 (H) 8 - 22 mg/dL    Creatinine 1.14 0.50 - 1.40 mg/dL    Calcium 9.6 8.4 - 10.2 mg/dL    Correct Calcium 9.0 8.5 - 10.5 mg/dL    AST(SGOT) 17 12 - 45 U/L    ALT(SGPT) 19 2 - 50 U/L    Alkaline Phosphatase 83 30 - 99 U/L    Total Bilirubin 0.4 0.1 - 1.5 mg/dL    Albumin 4.7 3.2 - 4.9 g/dL    Total Protein 7.1 6.0 - 8.2 g/dL    Globulin 2.4 1.9 - 3.5 g/dL    A-G Ratio 2.0 g/dL   URINALYSIS    Specimen: Urine   Result Value Ref Range    Color Yellow     Character Clear     Specific Gravity 1.020 <1.035    Ph 5.5 5.0 - 8.0    Glucose 500 (A) Negative mg/dL    Ketones Negative Negative mg/dL    Protein Negative Negative mg/dL    Bilirubin Negative Negative    Nitrite Negative Negative    Leukocyte Esterase Negative Negative    Occult Blood Negative Negative    Micro Urine Req see below    ESTIMATED GFR   Result Value Ref Range    GFR (CKD-EPI) 71 >60 mL/min/1.73 m 2   DIFFERENTIAL MANUAL   Result Value Ref Range    Manual Diff Status PERFORMED    PLATELET ESTIMATE   Result Value Ref Range    Plt Estimation Decreased    MORPHOLOGY   Result Value Ref Range    RBC Morphology Present     Poikilocytosis 1+     Ovalocytes 1+     Smudge Cells Few        RADIOLOGY  I have independently interpreted the diagnostic imaging associated with this visit and am waiting the final reading from the radiologist.   My preliminary interpretation is as follows:   -   Radiologist interpretation:   CT-LSPINE W/O PLUS RECONS   Final Result         1.  No acute traumatic bony injury of the lumbar spine.   2.  Bilateral neural foraminal stenosis at L3/L4, L4/L5,  and L5/S1.   3.  Atherosclerosis      MR-LUMBAR SPINE-W/O    (Results Pending)           MEDICAL DECISION MAKING      ED COURSE AND PLAN    Gil Hart is a 64 y.o. male presenting to the emergency department for an acute on chronic exacerbation of his low back pain.  He has a history of a lumbar spinal fusion remotely as well as a spinal stimulator.  and he says that since he has had a spinal stimulator in place he has never had an exacerbation of his pain like this.  He does not have any rosalie neurologic symptoms necessitating emergent MRI in the emergency department at this time.  I obtained a noncontrast CT scan of the lumbar spine which shows no evidence of acute pathology to explain his symptoms.  He was treated with multimodal pain medication in the emergency department, Tylenol, Valium, fentanyl, gabapentin, lidocaine patch which helped but did not resolve his low back pain.  CBC shows borderline leukocytosis consider possibility of epidural abscess but patient was recently started on steroids, do not feel symptoms are consistent with epidural abscess.  On reevaluation patient is amenable with admission to the hospital for pain control possible MRI in the morning  He was given a dose of pain-dose ketamine  D/W Dr. Solano for admission    ---Pertinent ED Course---:    11:02 PM I reviewed the patient's old records in Epic, medication list, allergies, past medical history and performed a physical examination.         Procedures:      ----------------------------------------------------------------------------------  DISCUSSIONS    I have discussed management of the patient with the following physicians and LEEROY's:  admitting hospitalist    Discussion of management with other hospitals or appropriate source(s):     Escalation of care considered, and ultimately not performed: See discussion above regarding deferral of emergent MRI transferred to [facility]    Barriers to care at this time, including but not limited  to:     Decision tools and prescription drugs considered including, but not limited to: Multimodal pain medication for exacerbation of low back pain.    FINAL IMPRESSION    1. Chronic left-sided low back pain without sciatica        New Prescriptions    No medications on file         DISPOSITION      Admission: The patient will be admitted for further evaluation and treatment. Discussed case with consultants and relayed all necessary information.        This chart was dictated using an electronic voice recognition software. The chart has been reviewed and edited but there is still possibility for dictation errors due to limitation of software.    Cuate Haddad DO 6/13/2024

## 2024-06-14 NOTE — THERAPY
"Physical Therapy   Initial Evaluation     Patient Name: Gil Hart  Age:  64 y.o., Sex:  male  Medical Record #: [id number]  Today's Date: 6/14/2024     Precautions  Precautions: Fall Risk  Comments: Neuromodulator L LS    Assessment  Patient is 64 y.o. male with a diagnosis of lower back pain with PMH neuromodulator, leukocytosis, HTN and hyperlipidemia. Pt presents today with significant back pain that did not inhibit functional mobility within the acute care setting; however, pt did not demonstrate any directional preference. Slump test and SLR test were exacerbated on both sides equally, but with less motion before feeling exacerbation on the L side. Pt was given therapeutic exercises to help increase ROM and increase core strength in order to help support the spine during stabilization. Pt was educated on anatomy of the vertebral column and educated on stenosis in addition to importance of attendance of outpatient PT and adherence to exercises. Pt performed bed mobility and ambulation independently with minimal VC. Patient will not be actively followed for physical therapy services at this time, however may be seen if requested by physician for 1 more visit within 30 days to address any discharge or equipment needs. Recommend outpatient physical therapy services to address higher level deficits.     Plan    Physical Therapy Initial Treatment Plan   Duration: (P) Discharge Needs Only    DC Equipment Recommendations: (P) None  Discharge Recommendations: (P) Recommend outpatient physical therapy services to address higher level deficits       Subjective    \"It radiates into my left glute.\"     Objective       06/14/24 1522   Initial Contact Note    Initial Contact Note Order Received and Verified, Physical Therapy Evaluation in Progress with Full Report to Follow.   Precautions   Precautions Fall Risk   Comments Neuromodulator L LS   Pain 0 - 10 Group   Location Back   Location Orientation " "Lower;Right;Left  (L > R)   Description Sharp;Burning   Therapist Pain Assessment Prior to Activity;During Activity;Post Activity;Nurse Notified;7  (Exacerbated with knee flexion, reduced with upright tolerance. Pt stated that the onset of the pain was 2 weeks ago when he was golfing.)   Prior Living Situation   Prior Services Home-Independent   Housing / Facility 1 Story House   Steps Into Home 0   Steps In Home 0   Rail None   Bathroom Set up Walk In Shower   Equipment Owned None   Lives with - Patient's Self Care Capacity Significant Other   Comments Pt lives with wife and is able to assist 24/7. Daughter is able to assist when needed but has a family of her own.   Prior Level of Functional Mobility   Bed Mobility Independent   Transfer Status Independent   Ambulation Independent   Ambulation Distance Community   Assistive Devices Used None   Stairs Independent   Comments Pt is relatively active and likes to golf. Works from home at the [employer] all day.   History of Falls   History of Falls No   Cognition    Cognition / Consciousness WDL   Level of Consciousness Alert   Comments Significantly pleasant and cooperative   Passive ROM Lower Body   Passive ROM Lower Body X   Comments Limited due to pain   Active ROM Lower Body    Active ROM Lower Body  X   Comments Limited due to pain   Strength Lower Body   Lower Body Strength  X   Comments Limited due to pain   Other Treatments   Other Treatments Provided Education on Ramiro method, muscles of the core \"soda can,\" core strengthening, importancce of functional mobility in regards to reducing stiffness, path of sciatic nerve, self care, therapeutic activities, and functional mobility   Balance Assessment   Sitting Balance (Static) Good   Sitting Balance (Dynamic) Good   Standing Balance (Static) Good   Standing Balance (Dynamic) Fair +   Weight Shift Sitting Good   Weight Shift Standing Good   Comments Functional   Bed Mobility    Supine to Sit Standby Assist   Sit " to Supine Standby Assist   Scooting Standby Assist   Comments Functional   Gait Analysis   Gait Level Of Assist Independent   Assistive Device None   Distance (Feet) 150   # of Times Distance was Traveled 1   Deviation Bradykinetic;Other (Comment)  (Reduced arm swing due to trunk pain)   # of Stairs Climbed 1   Level of Assist with Stairs Supervised   Weight Bearing Status FWB   Functional Mobility   Sit to Stand Independent   Bed, Chair, Wheelchair Transfer Independent   Transfer Method Stand Step   Mobility Bed mobility, STS, ambulation, stair, directional preference testing   Comments Functional   6 Clicks Assessment - How much HELP from from another person do you currently need... (If the patient hasn't done an activity recently, how much help from another person do you think he/she would need if he/she tried?)   Turning from your back to your side while in a flat bed without using bedrails? 4   Moving from lying on your back to sitting on the side of a flat bed without using bedrails? 4   Moving to and from a bed to a chair (including a wheelchair)? 4   Standing up from a chair using your arms (e.g., wheelchair, or bedside chair)? 4   Walking in hospital room? 4   Climbing 3-5 steps with a railing? 4   6 clicks Mobility Score 24   Activity Tolerance   Comments Functional   Edema / Skin Assessment   Edema / Skin  X   Comments Incision L LS for neuromodulator   Education Group   Education Provided Role of Physical Therapist;Stair Training;Exercises - Supine;Gait Training   Role of Physical Therapist Patient Response Patient;Acceptance;Explanation;Demonstration;Verbal Demonstration   Gait Training Patient Response Patient;Acceptance;Explanation;Action Demonstration   Stair Training Patient Response Patient;Acceptance;Explanation;Action Demonstration   Exercises - Supine Patient Response Patient;Acceptance;Demonstration;Explanation;Handout;Action Demonstration  (Pelvic tilts and lower trunk rotation stretch)    Physical Therapy Initial Treatment Plan    Duration Discharge Needs Only   Anticipated Discharge Equipment and Recommendations   DC Equipment Recommendations None   Discharge Recommendations Recommend outpatient physical therapy services to address higher level deficits   Interdisciplinary Plan of Care Collaboration   IDT Collaboration with  Nursing;Occupational Therapist   Patient Position at End of Therapy Seated;Call Light within Reach;Tray Table within Reach;Phone within Reach;Family / Friend in Room;Other (Comments)  (Spouse in room, MD entered)   Collaboration Comments Aware of session and recs   Session Information   Date / Session Number  6/14 Dc needs only

## 2024-06-14 NOTE — ED TRIAGE NOTES
"Chief Complaint   Patient presents with    Back Pain     Low back pain for 2 weeks, Denies recent injury. Was seen at [facility] Tues and placed on steroids with no relief. HX of spinal cord stimulator and hardware in back. Denies bowel or bladder issues.      BP (!) 209/120   Pulse 89   Temp 36.3 °C (97.3 °F) (Temporal)   Resp 18   Ht 1.88 m (6' 2\")   Wt 76.1 kg (167 lb 12.3 oz)   SpO2 99%   BMI 21.54 kg/m²     "

## 2024-06-14 NOTE — ASSESSMENT & PLAN NOTE
Patient does have significant acute on chronic lower back pain  Was seen at [facility], had a normal x-ray, given a Medrol Dosepak  CT scan obtained here showed bilateral neural foraminal stenosis at L3/4, L4/5 and L5/S1  MRI pending  Cleared by PT and OT  Probably discharge tomorrow once significant finding on MRI ruled out, of note patient has neuromodulator in place and it is noted that the battery pack is rather loose in his subcutaneous tissues which is chronic but query whether one of the wires could have been dislodged with the movement that he incurred while playing golf.  Unclear if MRI will be able to detect this.

## 2024-06-14 NOTE — ED NOTES
Med Rec completed per patient   Allergies reviewed  No ORAL antibiotics in last 30 days    Patient is not taking anticoagulants     Patient started a Medrol Dosepak on 6/12/2024

## 2024-06-14 NOTE — PROGRESS NOTES
4 Eyes Skin Assessment Completed by TOMMY Fonseca and TOMMY Boston.    Head WDL  Ears WDL  Nose WDL  Mouth WDL  Neck WDL  Breast/Chest WDL  Shoulder Blades WDL  Spine Incision, Scars to lower spine  (R) Arm/Elbow/Hand WDL  (L) Arm/Elbow/Hand WDL  Abdomen WDL  Groin WDL  Scrotum/Coccyx/Buttocks WDL  (R) Leg WDL  (L) Leg WDL  (R) Heel/Foot/Toe Redness and Blanching  (L) Heel/Foot/Toe Redness and Blanching          Devices In Places Blood Pressure Cuff, Pulse Ox, and SCD's      Interventions In Place Pillows    Possible Skin Injury No    Pictures Uploaded Into Epic N/A  Wound Consult Placed N/A  RN Wound Prevention Protocol Ordered No

## 2024-06-14 NOTE — H&P
[facility] Medicine History & Physical Note    Date of Service  6/13/2024    Primary Care Physician  Altaf Swenson D.O.    Consultants  None    Specialist Names: None    Code Status  Full Code    Chief Complaint  Chief Complaint   Patient presents with    Back Pain     Low back pain for 2 weeks, Denies recent injury. Was seen at [facility] and placed on steroids with no relief. HX of spinal cord stimulator and hardware in back. Denies bowel or bladder issues.        History of Presenting Illness  Gil Hart is a 64 y.o. male who presented 6/13/2024 with lower back pain.  Patient does have a history of lower back pain with fusion approximately 12 years ago after traumatic injury.  Patient has had a spinal cord stimulator implanted.  Generally he has good control of his pain with the stimulator however over the last 2 weeks it has been getting worse.  He states initially he was playing golf, felt a slight twinge which became spasm and then worsening pain.  It is sharp, severe at times.  Patient states it became significantly worse today so he presented to the emergency department.  2 days ago he went to [facility], saw the PA and was given a Medrol Dosepak, states it has not improved his pain.  Patient denies any incontinence.  He denies any weakness or sensory loss.  He denies any fever or chills.  I did discuss the case including labs and imaging with the ER physician.    I discussed the plan of care with patient.    Review of Systems  Review of Systems   Constitutional:  Negative for chills, fever and malaise/fatigue.   HENT:  Negative for congestion.    Respiratory:  Negative for cough, sputum production, shortness of breath and stridor.    Cardiovascular:  Negative for chest pain, palpitations and leg swelling.   Gastrointestinal:  Negative for abdominal pain, constipation, diarrhea, nausea and vomiting.   Genitourinary:  Negative for dysuria and urgency.   Musculoskeletal:  Positive for  back pain. Negative for falls and myalgias.   Neurological:  Negative for dizziness, tingling, sensory change, focal weakness, loss of consciousness, weakness and headaches.   Psychiatric/Behavioral:  Negative for depression and suicidal ideas.    All other systems reviewed and are negative.      Past Medical History   has a past medical history of Cancer ([facility]), Chronic use of opiate drugs therapeutic purposes (8/19/2017), Diabetes, Hemorrhagic disorder ([facility]), Hypertension, NSAID-induced duodenal ulcer (6/26/2017), Obesity (BMI 30-39.9) (9/2/2016), Pain, and Stroke ([facility]) (04/2016).    Surgical History   has a past surgical history that includes gastric bypass laparoscopic (01/01/2001); hand surgery (01/01/2006); bone spur excision (01/01/2008); laminotomy (01/01/2010); fusion, spine, lumbar, plif (01/01/2012); celiac plexus neurolysis; gastroscopy-endo (N/A, 05/30/2017); laminotomy (06/06/2018); spinal cord stimulator (06/06/2018); and vasectomy.     Family History  family history includes Alcohol/Drug in his mother; GI Disease in his sister; Heart Attack in his father; Heart Disease in his sister; Stroke in his sister.   Family history reviewed with patient. There is no family history that is pertinent to the chief complaint.     Social History   reports that he has never smoked. He has never used smokeless tobacco. He reports that he does not drink alcohol and does not use drugs.    Allergies  Allergies   Allergen Reactions    Pcn [Penicillins] Unspecified     RXN=Childhood allergy     Glipizide Unspecified    Metformin Hcl Unspecified    Penicillamine Unspecified       Medications  Prior to Admission Medications   Prescriptions Last Dose Informant Patient Reported? Taking?   Cholecalciferol (VITAMIN D3) 2000 UNIT Cap  Patient Yes No   Sig: Take 1 Capsule by mouth every day.   Continuous Blood Gluc Sensor (InsighteraSTYLE MARITZA 14 DAY SENSOR) Misc   Yes No   Sig: AS DIRECTED 14 DAYS TRANSDERMALLY 28   Cyanocobalamin  (VITAMIN B-12 PO)  Patient Yes No   Sig: Take 1 Tab by mouth every day.   MOUNJARO 7.5 MG/0.5ML Solution Pen-injector   Yes No   Sig: as directed 7.5mg Subcutaneous oncea week for 90 days   Multiple Vitamin (MULTI-VITAMIN DAILY) Tab   Yes No   Sig: Take  by mouth.   SYNJARDY XR 12.5-1000 MG TABLET SR 24 HR   Yes No   Sig: Take 1 Tablet by mouth 2 times a day.   acetaminophen (TYLENOL) 500 MG Tab  Patient Yes No   Sig: Take 2 Tablets by mouth 1 time a day as needed.   benazepril (LOTENSIN) 20 MG Tab   No No   Sig: Take 1 Tablet by mouth every day. TO ENSURE FURTHER REFILLS, PLEASE KEEP SCHEDULED FOLLOW UP VISIT APPOINTMENT. THANK YOU!   celecoxib (CELEBREX) 100 MG Cap   No No   Sig: Take 1 Capsule by mouth 2 times a day.   clopidogrel (PLAVIX) 75 MG Tab   No No   Sig: TAKE 1 TABLET BY MOUTH EVERY DAY   loratadine (CLARITIN) 10 MG Tab  Patient Yes No   Sig: Take 1 Tablet by mouth every day.   methylPREDNISolone (MEDROL DOSEPAK) 4 MG Tablet Therapy Pack   No No   Sig: Follow schedule on package instructions.   ondansetron (ZOFRAN) 4 MG Tab tablet   No No   Sig: TAKE 1 TABLET BY MOUTH EVERY FOUR HOURS AS NEEDED FOR NAUSEA/VOMITING.   pioglitazone (ACTOS) 15 MG Tab   No No   Sig: Take 1 Tab by mouth every day.   rosuvastatin (CRESTOR) 20 MG Tab   No No   Sig: Take 1 Tablet by mouth every evening.   tadalafil (CIALIS) 10 MG tablet   No No   Sig: TAKE 1 TABLET BY MOUTH 1 TIME A DAY AS NEEDED FOR ERECTILE DYSFUNCTION   tizanidine (ZANAFLEX) 4 MG Tab   No No   Sig: TAKE 1 TABLET BY MOUTH TWICE A DAY      Facility-Administered Medications: None       Physical Exam  Temp:  [36.3 °C (97.3 °F)] 36.3 °C (97.3 °F)  Pulse:  [89] 89  Resp:  [18] 18  BP: (209)/(120) 209/120  SpO2:  [99 %] 99 %  Blood Pressure : (!) 209/120   Temperature: 36.3 °C (97.3 °F)   Pulse: 89   Respiration: 18   Pulse Oximetry: 99 %       Physical Exam  Vitals and nursing note reviewed.   Constitutional:       General: He is not in acute distress.      Appearance: He is well-developed. He is not toxic-appearing or diaphoretic.   HENT:      Head: Normocephalic and atraumatic.      Right Ear: External ear normal.      Left Ear: External ear normal.      Nose: Nose normal. No congestion or rhinorrhea.      Mouth/Throat:      Mouth: Mucous membranes are dry.      Pharynx: No oropharyngeal exudate.   Eyes:      General:         Right eye: No discharge.         Left eye: No discharge.   Neck:      Trachea: No tracheal deviation.   Cardiovascular:      Rate and Rhythm: Normal rate and regular rhythm.      Heart sounds: No murmur heard.     No friction rub. No gallop.   Pulmonary:      Effort: Pulmonary effort is normal. No respiratory distress.      Breath sounds: Normal breath sounds. No stridor. No wheezing or rales.   Chest:      Chest wall: No tenderness.   Abdominal:      General: Bowel sounds are normal. There is no distension.      Palpations: Abdomen is soft.      Tenderness: There is no abdominal tenderness.   Musculoskeletal:         General: No tenderness. Normal range of motion.      Cervical back: Normal range of motion and neck supple. No edema or erythema.      Right lower leg: No edema.      Left lower leg: No edema.   Lymphadenopathy:      Cervical: No cervical adenopathy.   Skin:     General: Skin is warm and dry.      Findings: No erythema or rash.   Neurological:      Mental Status: He is alert and oriented to person, place, and time.      Cranial Nerves: No cranial nerve deficit.   Psychiatric:         Mood and Affect: Mood normal.         Behavior: Behavior normal.         Thought Content: Thought content normal.         Judgment: Judgment normal.         Laboratory:  Recent Labs     06/13/24 2137   WBC 15.9*   RBC 4.55*   HEMOGLOBIN 14.3   HEMATOCRIT 43.1   MCV 94.7   MCH 31.4   MCHC 33.2   RDW 45.4   PLATELETCT 146*   MPV 10.4     Recent Labs     06/13/24 2137   SODIUM 139   POTASSIUM 4.1   CHLORIDE 101   CO2 25   GLUCOSE 268*   BUN 25*  "  CREATININE 1.14   CALCIUM 9.6     Recent Labs     06/13/24  2137   ALTSGPT 19   ASTSGOT 17   ALKPHOSPHAT 83   TBILIRUBIN 0.4   GLUCOSE 268*         No results for input(s): \"NTPROBNP\" in the last 72 hours.      No results for input(s): \"TROPONINT\" in the last 72 hours.    Imaging:  CT-LSPINE W/O PLUS RECONS   Final Result         1.  No acute traumatic bony injury of the lumbar spine.   2.  Bilateral neural foraminal stenosis at L3/L4, L4/L5, and L5/S1.   3.  Atherosclerosis          X-Ray:  I have personally reviewed the images and compared with prior images.    Assessment/Plan:  Justification for Admission Status  I anticipate this patient is appropriate for observation status at this time because lower back pain    Patient will need a Med/Surg bed on MEDICAL service .  The need is secondary to lower back pain.    * Lower back pain- (present on admission)  Assessment & Plan  Patient does have significant acute on chronic lower back pain  Was seen at [facility], had a normal x-ray, given a Medrol Dosepak  CT scan obtained here showed bilateral neural foraminal stenosis at L3/4, L4/5 and L5/S1  Certainly do not think it is an epidural abscess, no need for urgent MRI  I will obtain an MRI however for further evaluation of his stenosis  Patient was given ketamine in the ER, if this has significant improvement will continue ketamine  Otherwise, start as needed opiates  Patient does have a stimulator in place    Leukocytosis- (present on admission)  Assessment & Plan  Likely demargination secondary to steroid use  No additional sign of bacterial infection  No need for antibiotics  Repeat CBC in the morning    Hyperglycemia due to type 2 diabetes mellitus (HCC)- (present on admission)  Assessment & Plan  Acutely worse due to steroid use  Stop steroids  Start insulin sliding scale  Adjust as needed    Hypertension- (present on admission)  Assessment & Plan  Continue home benazepril  Start as needed " labetalol  Adjust as needed    Mixed hyperlipidemia- (present on admission)  Assessment & Plan  Continue home statin        VTE prophylaxis: SCDs/TEDs

## 2024-06-14 NOTE — PROGRESS NOTES
Received report from VELMA Rodgers RN.    Pt arrived to unit via hospital bed. Ambulated to room bed with standby assistance. VS, allergies, med rec complete; Admit profile to be completed within shift. Discussed POC with pt. Welcome Guide provided and discussed. Communication board updated. Questions and concerns addressed with understanding verbalized by pt. Fall precautions in place; bed in low, locked position. Call light within reach, pt verbalizes understanding and importance of its use.

## 2024-06-15 ENCOUNTER — PHARMACY VISIT (OUTPATIENT)
Dept: PHARMACY | Facility: MEDICAL CENTER | Age: 64
End: 2024-06-15
Payer: COMMERCIAL

## 2024-06-15 VITALS
SYSTOLIC BLOOD PRESSURE: 175 MMHG | HEART RATE: 74 BPM | BODY MASS INDEX: 21.02 KG/M2 | HEIGHT: 75 IN | OXYGEN SATURATION: 96 % | TEMPERATURE: 96.8 F | RESPIRATION RATE: 16 BRPM | WEIGHT: 169.09 LBS | DIASTOLIC BLOOD PRESSURE: 98 MMHG

## 2024-06-15 PROCEDURE — 94760 N-INVAS EAR/PLS OXIMETRY 1: CPT

## 2024-06-15 PROCEDURE — 99239 HOSP IP/OBS DSCHRG MGMT >30: CPT | Performed by: INTERNAL MEDICINE

## 2024-06-15 PROCEDURE — 700102 HCHG RX REV CODE 250 W/ 637 OVERRIDE(OP): Performed by: INTERNAL MEDICINE

## 2024-06-15 PROCEDURE — RXMED WILLOW AMBULATORY MEDICATION CHARGE: Performed by: INTERNAL MEDICINE

## 2024-06-15 PROCEDURE — 97535 SELF CARE MNGMENT TRAINING: CPT

## 2024-06-15 PROCEDURE — A9270 NON-COVERED ITEM OR SERVICE: HCPCS | Performed by: INTERNAL MEDICINE

## 2024-06-15 PROCEDURE — G0378 HOSPITAL OBSERVATION PER HR: HCPCS

## 2024-06-15 RX ORDER — BENAZEPRIL HYDROCHLORIDE 20 MG/1
10 TABLET ORAL DAILY
Qty: 1 TABLET | Refills: 0
Start: 2024-06-15

## 2024-06-15 RX ORDER — ONDANSETRON 4 MG/1
4 TABLET, FILM COATED ORAL EVERY 4 HOURS PRN
Qty: 1 TABLET | Refills: 0
Start: 2024-06-15

## 2024-06-15 RX ORDER — OXYCODONE HYDROCHLORIDE 10 MG/1
10 TABLET ORAL EVERY 6 HOURS PRN
Qty: 20 TABLET | Refills: 0 | Status: SHIPPED | OUTPATIENT
Start: 2024-06-15 | End: 2024-06-22

## 2024-06-15 RX ADMIN — TIZANIDINE 4 MG: 4 TABLET ORAL at 06:04

## 2024-06-15 RX ADMIN — CLOPIDOGREL BISULFATE 75 MG: 75 TABLET ORAL at 06:02

## 2024-06-15 RX ADMIN — BENAZEPRIL HYDROCHLORIDE 20 MG: 10 TABLET ORAL at 06:04

## 2024-06-15 RX ADMIN — OXYCODONE HYDROCHLORIDE 10 MG: 10 TABLET ORAL at 06:02

## 2024-06-15 ASSESSMENT — COGNITIVE AND FUNCTIONAL STATUS - GENERAL
DAILY ACTIVITIY SCORE: 24
SUGGESTED CMS G CODE MODIFIER DAILY ACTIVITY: CH

## 2024-06-15 ASSESSMENT — PAIN DESCRIPTION - PAIN TYPE: TYPE: ACUTE PAIN

## 2024-06-15 NOTE — THERAPY
Occupational Therapy Contact Note    Patient Name: Gil Hart  Age:  64 y.o., Sex:  male  Medical Record #: [id number]  Today's Date: 6/15/2024   06/15/24 1020   Interdisciplinary Plan of Care Collaboration   Collaboration Comments Education only; pt is indep with ADL's and functional mobility.

## 2024-06-15 NOTE — PROGRESS NOTES
Report received from Marian SANDERS, assumed care of pt 1940.   POC and medications reviewed with pt. Pt verbalized understanding.   AOx4  C/o 9/10 pain in lower back at this time.  Denies pain, SOB, or dizziness at this time.   Safety measures in place.  Hourly rounding in place.

## 2024-06-15 NOTE — CARE PLAN
The patient is Stable - Low risk of patient condition declining or worsening    Shift Goals  Clinical Goals: Patients pain reported tolerable, 5/10 or less after ordered interventions.  Patient Goals: Pain control  Family Goals: BRANDIE    Progress made toward(s) clinical / shift goals:  Patient's pain remained 5/10 or less after medication intervention throughout shift.     Patient is not progressing towards the following goals:

## 2024-06-15 NOTE — PROGRESS NOTES
Hospital Medicine Daily Progress Note    Date of Service  6/14/2024    Chief Complaint  Gil Hart is a 64 y.o. male admitted 6/13/2024 with worsening back pain over the last 3 weeks he has prior history of lumbar fusion as well as neuro modulation device implantation.    Hospital Course  Patient admitted to the medical floor, unfortunately MRI scanner down all morning and MRI will be done this evening    Interval Problem Update  Patient still having back pain, controlled with intermittent doses of 10 mg oxycodone recently seen by PT and OT and cleared for discharge to home without any additional recommendations, he is still awaiting MRI.  Unclear if his device is compatible with MRI but by reading the card and reviewing the website it appears so, [employer] representative has not contacted them after they left a message and [employer] has similarly not been in touch.  Patient has been using heat and ice at home over the last 3 weeks for his discomfort I have advised him that with an injury he is always inadvisable as it will exacerbate swelling and inflammation.    He may use ice liberally    Wife present at bedside when seen    I have discussed this patient's plan of care and discharge plan at IDT rounds today with Case Management, Nursing, Nursing leadership, and other members of the IDT team.    Consultants/Specialty  none    Code Status  Full Code    Disposition  The patient is not medically cleared for discharge to home or a post-acute facility.  Anticipate discharge to: home with close outpatient follow-up    I have placed the appropriate orders for post-discharge needs.    Review of Systems  Review of Systems   Musculoskeletal:  Positive for back pain.        Physical Exam  Temp:  [36.2 °C (97.1 °F)-37 °C (98.6 °F)] 37 °C (98.6 °F)  Pulse:  [75-90] 89  Resp:  [12-25] 18  BP: (140-209)/() 162/95  SpO2:  [93 %-99 %] 98 %    Physical Exam  Vitals and nursing note reviewed.   Constitutional:        General: He is not in acute distress.     Appearance: Normal appearance. He is normal weight. He is not ill-appearing.   HENT:      Head: Normocephalic and atraumatic.      Nose: Nose normal.      Mouth/Throat:      Mouth: Mucous membranes are moist.   Eyes:      Extraocular Movements: Extraocular movements intact.      Pupils: Pupils are equal, round, and reactive to light.   Cardiovascular:      Rate and Rhythm: Normal rate and regular rhythm.   Pulmonary:      Effort: Pulmonary effort is normal.      Breath sounds: Normal breath sounds.   Abdominal:      General: Bowel sounds are normal. There is no distension.      Palpations: Abdomen is soft.      Tenderness: There is no abdominal tenderness.   Musculoskeletal:      Right lower leg: No edema.      Left lower leg: No edema.      Comments: Very uncomfortable when sitting up, or changing positions, no focal tenderness over spine, he has palpable hardware in his left flank there is no surrounding erythema or evidence of any inflammation or infection he says this hardware has always been as it is now, it appears that he has lost some weight in the past and has loose skin in both flanks   Skin:     General: Skin is warm and dry.   Neurological:      General: No focal deficit present.      Mental Status: He is alert and oriented to person, place, and time.      Cranial Nerves: No cranial nerve deficit.      Motor: No weakness.   Psychiatric:         Mood and Affect: Mood normal.         Behavior: Behavior normal.         Fluids    Intake/Output Summary (Last 24 hours) at 6/14/2024 1813  Last data filed at 6/14/2024 1335  Gross per 24 hour   Intake 910.4 ml   Output 1025 ml   Net -114.6 ml       Laboratory  Recent Labs     06/13/24  2137 06/14/24  0307   WBC 15.9* 11.7*   RBC 4.55* 4.15*   HEMOGLOBIN 14.3 12.9*   HEMATOCRIT 43.1 39.0*   MCV 94.7 94.0   MCH 31.4 31.1   MCHC 33.2 33.1   RDW 45.4 45.1   PLATELETCT 146* 109*   MPV 10.4 10.5     Recent Labs      06/13/24  2137 06/14/24  0307   SODIUM 139 139   POTASSIUM 4.1 3.5*   CHLORIDE 101 103   CO2 25 25   GLUCOSE 268* 132*   BUN 25* 23*   CREATININE 1.14 0.89   CALCIUM 9.6 8.6                   Imaging  CT-LSPINE W/O PLUS RECONS   Final Result         1.  No acute traumatic bony injury of the lumbar spine.   2.  Bilateral neural foraminal stenosis at L3/L4, L4/L5, and L5/S1.   3.  Atherosclerosis      MR-LUMBAR SPINE-W/O    (Results Pending)        Assessment/Plan  * Lower back pain- (present on admission)  Assessment & Plan  Patient does have significant acute on chronic lower back pain  Was seen at [facility], had a normal x-ray, given a Medrol Dosepak  CT scan obtained here showed bilateral neural foraminal stenosis at L3/4, L4/5 and L5/S1  MRI pending  Cleared by PT and OT  Probably discharge tomorrow once significant finding on MRI ruled out, of note patient has neuromodulator in place and it is noted that the battery pack is rather loose in his subcutaneous tissues which is chronic but query whether one of the wires could have been dislodged with the movement that he incurred while playing golf.  Unclear if MRI will be able to detect this.    Leukocytosis- (present on admission)  Assessment & Plan  Likely demargination secondary to steroid use  No additional sign of bacterial infection  Improved after steroid cessation    Hyperglycemia due to type 2 diabetes mellitus (HCC)- (present on admission)  Assessment & Plan  Acutely worse due to steroid use  Stop steroids  Start insulin sliding scale  Adjust as needed    Hypertension- (present on admission)  Assessment & Plan  Continue home benazepril  Start as needed labetalol  Adjust as needed    Mixed hyperlipidemia- (present on admission)  Assessment & Plan  Continue home statin         VTE prophylaxis:   SCDs/TEDs      I have performed a physical exam and reviewed and updated ROS and Plan today (6/14/2024). In review of yesterday's note (6/13/2024), there are  no changes except as documented above.

## 2024-06-15 NOTE — PROGRESS NOTES
Received bedside report from NOC RN, assumed care of patient. AO4 on room air, denies pain at this time, ambulating in room by self. Discussed plan of care for the day, patient requests to shower, awaiting MD for discharge orders. Call light within reach, bed in lowest position.

## 2024-06-15 NOTE — PROGRESS NOTES
Educated patient on discharge instructions, rx medications to be picked up at [facility] and follow up with PCP.    Patient voiced understanding . VS stable.  Patient alert and appropriate. All questions and concerns addressed. No further questions or concerns at this time. Copy of discharge paperwork provided.  Patient out of department with family in stable condition.

## 2024-06-16 NOTE — DISCHARGE SUMMARY
Discharge Summary    CHIEF COMPLAINT ON ADMISSION  Chief Complaint   Patient presents with    Back Pain     Low back pain for 2 weeks, Denies recent injury. Was seen at [facility] clinic Tues and placed on steroids with no relief. HX of spinal cord stimulator and hardware in back. Denies bowel or bladder issues.        Reason for Admission  Lower Left Back Pain     Admission Date  6/13/2024    CODE STATUS  Prior    HPI & HOSPITAL COURSE  This is a 64 y.o. male with a prior history of lumbar fusion and spinal cord neuromodulator device here with worsening back pain for 3 weeks.  Patient has been doing quite well with his back in the neuromodulator and been very functional-he is very active and golfs frequently approximately 3 weeks following a golf swing he began to have low back pain which despite using ice heat and more recently a Medrol Dosepak his pain has continued to worsen and became intractable prompting him to come to the emergency room.  CT scan and plain films were unrevealing and he subsequently underwent MRI which did show some chronic degenerative changes but nothing overt to explain the cause of his pain.  On physical exam he was noted to have loose skin in the area of the neuromodulator battery pack and patient apparently has lost quite a bit of weight due to his diabetes medications.  His orthopedic surgeon has not been concerned about the loose area around the battery pack but it is conceivable that during his golf swing he may have dislodged one of the leads somewhat resulting insuboptimal operation of the device which would explain why his pain has gradually worsened.  MRI also did not reveal anything related to the device and he may need fluoroscopy or evaluation by [employer] to see if everything appears to be in place and operating as expected.  I have advised him to only use ice not heat on the area, to use opioid pain medication only if his pain becomes unbearable and intractable.  To follow-up with  [employer] representative and his spine surgeon to see if his pain is related to the device as discussed above.      Discharge Date  6/15/2024    FOLLOW UP ITEMS POST DISCHARGE  Spine surgery and [employer] rep    DISCHARGE DIAGNOSES  Principal Problem:    Lower back pain (POA: Yes)  Active Problems:    Mixed hyperlipidemia (POA: Yes)    Hypertension (POA: Yes)    Hyperglycemia due to type 2 diabetes mellitus (HCC) (POA: Yes)    Leukocytosis (POA: Yes)  Resolved Problems:    * No resolved hospital problems. *      FOLLOW UP  Future Appointments   Date Time Provider Department Center   8/23/2024  4:20 PM Evangelista Rivera M.D. [facility] None     No follow-up provider specified.    MEDICATIONS ON DISCHARGE     Medication List        START taking these medications        Instructions   oxyCODONE immediate release 10 MG immediate release tablet  Commonly known as: Roxicodone   Take 1 Tablet by mouth every 6 hours as needed for Severe Pain for up to 5 days.  Dose: 10 mg            CHANGE how you take these medications        Instructions   tizanidine 4 MG Tabs  What changed:   when to take this  additional instructions  Commonly known as: Zanaflex   TAKE 1 TABLET BY MOUTH TWICE A DAY            CONTINUE taking these medications        Instructions   acetaminophen 500 MG Tabs  Commonly known as: Tylenol   Take 500-1,000 mg by mouth every 6 hours as needed. Indications: Pain  Dose: 500-1,000 mg     benazepril 20 MG Tabs  Commonly known as: Lotensin   Take 0.5 Tablets by mouth every day. TO ENSURE FURTHER REFILLS, PLEASE KEEP SCHEDULED FOLLOW UP VISIT APPOINTMENT. THANK YOU!  Dose: 10 mg     celecoxib 100 MG Caps  Commonly known as: CeleBREX   Take 1 Capsule by mouth 2 times a day.  Dose: 100 mg     clopidogrel 75 MG Tabs  Commonly known as: Plavix   TAKE 1 TABLET BY MOUTH EVERY DAY  Dose: 75 mg     loratadine 10 MG Tabs  Commonly known as: Claritin   Take 1 Tablet by mouth every day.  Dose: 10 mg     Mounjaro 7.5 MG/0.5ML  Sopn  Generic drug: Tirzepatide   Inject 7.5 mg under the skin every Wednesday.  Dose: 7.5 mg     Multi-Vitamin Daily Tabs   Take 1 Tablet by mouth every day.  Dose: 1 Tablet     ondansetron 4 MG Tabs tablet  Commonly known as: Zofran   Take 1 Tablet by mouth every four hours as needed for Nausea/Vomiting. Takes every morning and then as needed  Dose: 4 mg     pioglitazone 30 MG Tabs  Commonly known as: Actos   Take 30 mg by mouth every day.  Dose: 30 mg     rosuvastatin 20 MG Tabs  Commonly known as: Crestor   Take 1 Tablet by mouth every evening.  Dose: 20 mg     Synjardy XR 12.5-1000 MG Tb24  Generic drug: Empagliflozin-metFORMIN HCl ER   Take 1 Tablet by mouth 2 times a day.  Dose: 1 Tablet     tadalafil 10 MG tablet  Commonly known as: Cialis   TAKE 1 TABLET BY MOUTH 1 TIME A DAY AS NEEDED FOR ERECTILE DYSFUNCTION     VITAMIN B-12 PO   Take 1 Tab by mouth every day.  Dose: 1 Tablet     VITAMIN D3 PO   Take 1 Capsule by mouth every day.  Dose: 1 Capsule     Voltaren 1 % Gel  Generic drug: diclofenac sodium   Apply 2 g topically 4 times a day as needed (Pain). Apply to back  Dose: 2 g            STOP taking these medications      methylPREDNISolone 4 MG Tbpk  Commonly known as: Medrol Dosepak              Allergies  Allergies   Allergen Reactions    Pcn [Penicillins] Unspecified     RXN=Childhood allergy        DIET  No orders of the defined types were placed in this encounter.      ACTIVITY  Light duty.  Weight bearing as tolerated    CONSULTATIONS  None    PROCEDURES  None    LABORATORY  Lab Results   Component Value Date    SODIUM 139 06/14/2024    POTASSIUM 3.5 (L) 06/14/2024    CHLORIDE 103 06/14/2024    CO2 25 06/14/2024    GLUCOSE 132 (H) 06/14/2024    BUN 23 (H) 06/14/2024    CREATININE 0.89 06/14/2024        Lab Results   Component Value Date    WBC 11.7 (H) 06/14/2024    HEMOGLOBIN 12.9 (L) 06/14/2024    HEMATOCRIT 39.0 (L) 06/14/2024    PLATELETCT 109 (L) 06/14/2024        Total time of the discharge  process exceeds 40 minutes.

## 2024-07-10 ENCOUNTER — PATIENT MESSAGE (OUTPATIENT)
Dept: MEDICAL GROUP | Facility: LAB | Age: 64
End: 2024-07-10
Payer: COMMERCIAL

## 2024-07-10 DIAGNOSIS — L03.116 CELLULITIS OF LEFT LOWER EXTREMITY: ICD-10-CM

## 2024-07-10 RX ORDER — CEPHALEXIN 500 MG/1
500 CAPSULE ORAL 4 TIMES DAILY
Qty: 28 CAPSULE | Refills: 0 | Status: SHIPPED | OUTPATIENT
Start: 2024-07-10 | End: 2024-07-29

## 2024-07-26 ENCOUNTER — APPOINTMENT (OUTPATIENT)
Dept: CARDIOLOGY | Facility: MEDICAL CENTER | Age: 64
End: 2024-07-26
Payer: COMMERCIAL

## 2024-07-29 ENCOUNTER — OFFICE VISIT (OUTPATIENT)
Dept: CARDIOLOGY | Facility: MEDICAL CENTER | Age: 64
End: 2024-07-29
Payer: COMMERCIAL

## 2024-07-29 VITALS
BODY MASS INDEX: 19.78 KG/M2 | WEIGHT: 159.1 LBS | HEIGHT: 75 IN | OXYGEN SATURATION: 98 % | SYSTOLIC BLOOD PRESSURE: 102 MMHG | RESPIRATION RATE: 16 BRPM | HEART RATE: 96 BPM | DIASTOLIC BLOOD PRESSURE: 70 MMHG

## 2024-07-29 DIAGNOSIS — R93.1 AGATSTON CORONARY ARTERY CALCIUM SCORE GREATER THAN 400: ICD-10-CM

## 2024-07-29 DIAGNOSIS — I10 PRIMARY HYPERTENSION: ICD-10-CM

## 2024-07-29 DIAGNOSIS — E78.2 MIXED HYPERLIPIDEMIA: ICD-10-CM

## 2024-07-29 DIAGNOSIS — Z86.73 HISTORY OF STROKE: ICD-10-CM

## 2024-07-29 PROBLEM — L03.116 CELLULITIS OF LEFT LOWER EXTREMITY: Status: RESOLVED | Noted: 2021-08-31 | Resolved: 2024-07-29

## 2024-07-29 PROCEDURE — 99213 OFFICE O/P EST LOW 20 MIN: CPT

## 2024-07-29 RX ORDER — BENAZEPRIL HYDROCHLORIDE 20 MG/1
10 TABLET ORAL DAILY
Qty: 50 TABLET | Refills: 3 | Status: SHIPPED | OUTPATIENT
Start: 2024-07-29

## 2024-07-29 RX ORDER — ROSUVASTATIN CALCIUM 20 MG/1
20 TABLET, COATED ORAL EVERY EVENING
Qty: 100 TABLET | Refills: 3 | Status: SHIPPED | OUTPATIENT
Start: 2024-07-29

## 2024-07-29 ASSESSMENT — ENCOUNTER SYMPTOMS
NERVOUS/ANXIOUS: 0
NEUROLOGICAL NEGATIVE: 1
CONSTITUTIONAL NEGATIVE: 1
ORTHOPNEA: 0
DEPRESSION: 0
PND: 0
PALPITATIONS: 0
GASTROINTESTINAL NEGATIVE: 1
EYES NEGATIVE: 1
MUSCULOSKELETAL NEGATIVE: 1
SHORTNESS OF BREATH: 0

## 2024-07-29 ASSESSMENT — FIBROSIS 4 INDEX: FIB4 SCORE: 2.33

## 2024-12-04 ENCOUNTER — OFFICE VISIT (OUTPATIENT)
Dept: MEDICAL GROUP | Facility: LAB | Age: 64
End: 2024-12-04
Payer: COMMERCIAL

## 2024-12-04 ENCOUNTER — HOSPITAL ENCOUNTER (OUTPATIENT)
Dept: RADIOLOGY | Facility: MEDICAL CENTER | Age: 64
End: 2024-12-04
Attending: INTERNAL MEDICINE
Payer: COMMERCIAL

## 2024-12-04 VITALS
HEART RATE: 84 BPM | HEIGHT: 74 IN | OXYGEN SATURATION: 96 % | BODY MASS INDEX: 22.25 KG/M2 | DIASTOLIC BLOOD PRESSURE: 88 MMHG | RESPIRATION RATE: 16 BRPM | TEMPERATURE: 97.6 F | WEIGHT: 173.4 LBS | SYSTOLIC BLOOD PRESSURE: 130 MMHG

## 2024-12-04 DIAGNOSIS — Z23 NEED FOR PROPHYLACTIC VACCINATION AGAINST STREPTOCOCCUS PNEUMONIAE (PNEUMOCOCCUS): ICD-10-CM

## 2024-12-04 DIAGNOSIS — Z79.4 TYPE 2 DIABETES MELLITUS WITHOUT COMPLICATION, WITH LONG-TERM CURRENT USE OF INSULIN (HCC): ICD-10-CM

## 2024-12-04 DIAGNOSIS — E11.9 TYPE 2 DIABETES MELLITUS WITHOUT COMPLICATION, WITH LONG-TERM CURRENT USE OF INSULIN (HCC): ICD-10-CM

## 2024-12-04 DIAGNOSIS — M79.671 RIGHT FOOT PAIN: ICD-10-CM

## 2024-12-04 DIAGNOSIS — Z23 NEED FOR IMMUNIZATION AGAINST INFLUENZA: ICD-10-CM

## 2024-12-04 LAB
HBA1C MFR BLD: 7.6 % (ref ?–5.8)
POCT INT CON NEG: NEGATIVE
POCT INT CON POS: POSITIVE

## 2024-12-04 PROCEDURE — 3075F SYST BP GE 130 - 139MM HG: CPT | Performed by: INTERNAL MEDICINE

## 2024-12-04 PROCEDURE — 90677 PCV20 VACCINE IM: CPT | Performed by: INTERNAL MEDICINE

## 2024-12-04 PROCEDURE — 99214 OFFICE O/P EST MOD 30 MIN: CPT | Mod: 25 | Performed by: INTERNAL MEDICINE

## 2024-12-04 PROCEDURE — 90472 IMMUNIZATION ADMIN EACH ADD: CPT | Performed by: INTERNAL MEDICINE

## 2024-12-04 PROCEDURE — 90471 IMMUNIZATION ADMIN: CPT | Performed by: INTERNAL MEDICINE

## 2024-12-04 PROCEDURE — 73630 X-RAY EXAM OF FOOT: CPT | Mod: RT

## 2024-12-04 PROCEDURE — 3078F DIAST BP <80 MM HG: CPT | Performed by: INTERNAL MEDICINE

## 2024-12-04 PROCEDURE — 90662 IIV NO PRSV INCREASED AG IM: CPT | Performed by: INTERNAL MEDICINE

## 2024-12-04 PROCEDURE — 83036 HEMOGLOBIN GLYCOSYLATED A1C: CPT | Performed by: INTERNAL MEDICINE

## 2024-12-04 ASSESSMENT — FIBROSIS 4 INDEX: FIB4 SCORE: 2.33

## 2024-12-04 NOTE — PROGRESS NOTES
CC: Gil Hart is a 64 y.o. male is suffering from   Chief Complaint   Patient presents with    Foot Swelling     Right foot is swelling and hurting has been happening for about a week          SUBJECTIVE:  1. Right foot pain  Robert is here for follow-up, experience problems with pain discomfort associated with his right foot 1 week prior, x-rays been ordered    2. Need for prophylactic vaccination against Streptococcus pneumoniae (pneumococcus)  Patient is in need of pneumococcal vaccination which is given    3. Need for immunization against influenza  Influenza shot has been given    4. Type 2 diabetes mellitus without complication, with long-term current use of insulin (HCC)  History of type 2 diabetes with reasonable control        Past social, family, history: , Roseline  Social History     Tobacco Use    Smoking status: Never    Smokeless tobacco: Never   Vaping Use    Vaping status: Never Used   Substance Use Topics    Alcohol use: No     Comment: Past history of alcoholism--last drink was 2010--attends Recovery Meetings at his [facility]    Drug use: No         MEDICATIONS:    Current Outpatient Medications:     benazepril (LOTENSIN) 20 MG Tab, Take 0.5 Tablets by mouth every day. TO ENSURE FURTHER REFILLS, PLEASE KEEP SCHEDULED FOLLOW UP VISIT APPOINTMENT. THANK YOU!, Disp: 50 Tablet, Rfl: 3    rosuvastatin (CRESTOR) 20 MG Tab, Take 1 Tablet by mouth every evening., Disp: 100 Tablet, Rfl: 3    ondansetron (ZOFRAN) 4 MG Tab tablet, Take 1 Tablet by mouth every four hours as needed for Nausea/Vomiting. Takes every morning and then as needed, Disp: 1 Tablet, Rfl: 0    diclofenac sodium (VOLTAREN) 1 % Gel, Apply 2 g topically 4 times a day as needed (Pain). Apply to back, Disp: , Rfl:     pioglitazone (ACTOS) 30 MG Tab, Take 30 mg by mouth every day., Disp: , Rfl:     tadalafil (CIALIS) 10 MG tablet, TAKE 1 TABLET BY MOUTH 1 TIME A DAY AS NEEDED FOR ERECTILE DYSFUNCTION (Patient taking differently:  Take 10 mg by mouth 1 time a day as needed for Erectile Dysfunction.), Disp: 6 Tablet, Rfl: 3    MOUNJARO 7.5 MG/0.5ML Solution Pen-injector, Inject 7.5 mg under the skin every Wednesday., Disp: , Rfl:     tizanidine (ZANAFLEX) 4 MG Tab, TAKE 1 TABLET BY MOUTH TWICE A DAY (Patient taking differently: Take 4 mg by mouth see administration instructions. Takes every night scheduled and then as needed), Disp: 180 Tablet, Rfl: 3    clopidogrel (PLAVIX) 75 MG Tab, TAKE 1 TABLET BY MOUTH EVERY DAY, Disp: 90 Tablet, Rfl: 4    SYNJARDY XR 12.5-1000 MG TABLET SR 24 HR, Take 1 Tablet by mouth 2 times a day., Disp: , Rfl:     Multiple Vitamin (MULTI-VITAMIN DAILY) Tab, Take 1 Tablet by mouth every day., Disp: , Rfl:     acetaminophen (TYLENOL) 500 MG Tab, Take 500-1,000 mg by mouth every 6 hours as needed. Indications: Pain, Disp: , Rfl:     Cholecalciferol (VITAMIN D3 PO), Take 1 Capsule by mouth every day., Disp: , Rfl:     loratadine (CLARITIN) 10 MG Tab, Take 1 Tablet by mouth every day., Disp: , Rfl:     Cyanocobalamin (VITAMIN B-12 PO), Take 1 Tab by mouth every day., Disp: , Rfl:     PROBLEMS:  Patient Active Problem List    Diagnosis Date Noted    Agatston coronary artery calcium score greater than 400 07/29/2024    Leukocytosis 06/14/2024    Lower back pain 06/13/2024    Hyperglycemia due to type 2 diabetes mellitus (HCC) 08/31/2021    Chronic use of opiate drug for therapeutic purpose 08/19/2017    NSAID-induced duodenal ulcer 06/26/2017    Medical marijuana use 06/26/2017    Normocytic normochromic anemia 05/29/2017    Hyponatremia 05/29/2017    Chronic pancreatitis (HCC) 05/28/2017    Dilated cbd, acquired 05/28/2017    Obesity (BMI 30-39.9) 09/02/2016    History of stroke 04/12/2016    Family history of cerebral aneurysm 02/09/2016    Type 2 diabetes mellitus without complication (HCC) 02/05/2016    CLL (chronic lymphocytic leukemia) ([facility]) 11/09/2015    Low back pain radiating to right leg 12/03/2013    Mixed  "hyperlipidemia 12/03/2013    Hypertension 12/03/2013    Vitamin D deficiency disease 12/03/2013    Personal history of alcoholism (HCC) 12/03/2013    History of Amanda-en-Y gastric bypass 12/03/2013       REVIEW OF SYSTEMS:  Gen.:  No Nausea, Vomiting, fever, Chills.  Heart: No chest pain.  Lungs:  No shortness of Breath.  Psychological: Víctor unusual Anxiety depression     PHYSICAL EXAM      Constitutional: Alert, cooperative, not in acute distress.  Cardiovascular:  Rate Rhythm is regular without murmurs rubs clicks.     Thorax & Lungs: Clear to auscultation, no wheezing, rhonchi, or rales  HENT: Normocephalic, Atraumatic.  Eyes: PERRLA, EOMI, Conjunctiva normal.   Neck: Trachia is midline no swelling of the thyroid.   Lymphatic: No lymphadenopathy noted.   Musculoskeletal: Retained dorsiflexion plantarflexion internal/external rotation but pain to palpation at the forefoot, also medial arch  Neurologic: Alert & oriented x 3, cranial nerves II through XII are intact, Normal motor function, Normal sensory function, No focal deficits noted.   Psychiatric: Affect normal, Judgment normal, Mood normal.     VITAL SIGNS:/88   Pulse 84   Temp 36.4 °C (97.6 °F) (Temporal)   Resp 16   Ht 1.88 m (6' 2\")   Wt 78.7 kg (173 lb 6.4 oz)   SpO2 96%   BMI 22.26 kg/m²     Labs: Reviewed    Assessment:                                                     Plan:     1. Right foot pain  X-ray right foot ordered  - DX-FOOT-COMPLETE 3+ RIGHT; Future    2. Need for prophylactic vaccination against Streptococcus pneumoniae (pneumococcus)  Vaccination given  - Pneumococcal Conjugate Vaccine 20-Valent (6 wks+)    3. Need for immunization against influenza  Vaccination given  - Influenza Vaccine, High Dose (65+ Only)    4. Type 2 diabetes mellitus without complication, with long-term current use of insulin ([facility])  A1c is 7.4  - POCT  A1C      Hba 7.4 needle like pain in the feet. Problems iwith the right foot at the mid foot   "

## 2024-12-18 ENCOUNTER — PATIENT MESSAGE (OUTPATIENT)
Dept: MEDICAL GROUP | Facility: LAB | Age: 64
End: 2024-12-18
Payer: COMMERCIAL

## 2024-12-18 DIAGNOSIS — M79.671 RIGHT FOOT PAIN: ICD-10-CM

## 2024-12-20 ENCOUNTER — PATIENT MESSAGE (OUTPATIENT)
Dept: MEDICAL GROUP | Facility: LAB | Age: 64
End: 2024-12-20
Payer: COMMERCIAL

## 2025-01-06 ENCOUNTER — PATIENT MESSAGE (OUTPATIENT)
Dept: MEDICAL GROUP | Facility: LAB | Age: 65
End: 2025-01-06
Payer: COMMERCIAL

## 2025-01-06 ENCOUNTER — TELEPHONE (OUTPATIENT)
Dept: MEDICAL GROUP | Facility: LAB | Age: 65
End: 2025-01-06
Payer: COMMERCIAL

## 2025-01-06 DIAGNOSIS — J32.9 SINUSITIS, UNSPECIFIED CHRONICITY, UNSPECIFIED LOCATION: ICD-10-CM

## 2025-01-06 RX ORDER — DOXYCYCLINE HYCLATE 100 MG
100 TABLET ORAL 2 TIMES DAILY
Qty: 14 TABLET | Refills: 0 | Status: SHIPPED | OUTPATIENT
Start: 2025-01-06

## 2025-01-06 NOTE — TELEPHONE ENCOUNTER
Spouse called stating that Gil has had a sinus infection for four days. She reports he has had sinus pressure, headache, congestion, and overall discomfort. She is requesting advice on how to improve his symptoms or if medication is necessary.

## 2025-01-07 NOTE — PROGRESS NOTES
Kacie:  Please call Robert tell him that I have written out a prescription for doxycycline which I have sent over to his [facility] pharmacy in [place]!  Regards, Altaf Swenson, DO

## 2025-03-10 RX ORDER — CLOPIDOGREL BISULFATE 75 MG/1
75 TABLET ORAL
Qty: 90 TABLET | Refills: 4 | Status: SHIPPED | OUTPATIENT
Start: 2025-03-10

## 2025-03-13 ENCOUNTER — HOSPITAL ENCOUNTER (OUTPATIENT)
Dept: RADIOLOGY | Facility: MEDICAL CENTER | Age: 65
End: 2025-03-13
Attending: INTERNAL MEDICINE
Payer: COMMERCIAL

## 2025-03-13 DIAGNOSIS — M79.671 RIGHT FOOT PAIN: ICD-10-CM

## 2025-03-13 PROCEDURE — 93922 UPR/L XTREMITY ART 2 LEVELS: CPT

## 2025-03-18 ENCOUNTER — RESULTS FOLLOW-UP (OUTPATIENT)
Dept: MEDICAL GROUP | Facility: LAB | Age: 65
End: 2025-03-18

## 2025-04-25 DIAGNOSIS — M79.604 LOW BACK PAIN RADIATING TO RIGHT LEG: ICD-10-CM

## 2025-04-25 DIAGNOSIS — M54.50 LOW BACK PAIN RADIATING TO RIGHT LEG: ICD-10-CM

## 2025-06-29 ENCOUNTER — OFFICE VISIT (OUTPATIENT)
Dept: URGENT CARE | Facility: PHYSICIAN GROUP | Age: 65
End: 2025-06-29
Payer: COMMERCIAL

## 2025-06-29 VITALS
HEIGHT: 74 IN | RESPIRATION RATE: 16 BRPM | OXYGEN SATURATION: 98 % | SYSTOLIC BLOOD PRESSURE: 130 MMHG | HEART RATE: 90 BPM | WEIGHT: 165.34 LBS | DIASTOLIC BLOOD PRESSURE: 70 MMHG | TEMPERATURE: 97.7 F | BODY MASS INDEX: 21.22 KG/M2

## 2025-06-29 DIAGNOSIS — J00 ACUTE NASOPHARYNGITIS: ICD-10-CM

## 2025-06-29 DIAGNOSIS — S05.02XA ABRASION OF LEFT CORNEA, INITIAL ENCOUNTER: Primary | ICD-10-CM

## 2025-06-29 RX ORDER — ERYTHROMYCIN 5 MG/G
1 OINTMENT OPHTHALMIC
Qty: 3.5 G | Refills: 0 | Status: SHIPPED | OUTPATIENT
Start: 2025-06-29 | End: 2025-07-04

## 2025-06-29 ASSESSMENT — ENCOUNTER SYMPTOMS
EYE PAIN: 1
EYE REDNESS: 1
CONSTITUTIONAL NEGATIVE: 1
EYE DISCHARGE: 1
BLURRED VISION: 1
SORE THROAT: 1
GASTROINTESTINAL NEGATIVE: 1
CARDIOVASCULAR NEGATIVE: 1
MUSCULOSKELETAL NEGATIVE: 1
COUGH: 1

## 2025-06-29 ASSESSMENT — VISUAL ACUITY
OD_CC: 20/40
OS_CC: 20/50

## 2025-06-29 ASSESSMENT — FIBROSIS 4 INDEX: FIB4 SCORE: 2.36

## 2025-06-29 NOTE — PROGRESS NOTES
"Subjective:   Gil Hart is a 65 y.o. male who presents for Eye Drainage (Lt eye drainage since last night pt feels like a foreign object inside) and Nasal Congestion (Sx's started 6 days ago.)      Awoke with left eye pain, like something was in it. Paracaine placed in eye, fluorascene under black light shows corneal abrasion at the 6:00 position.  Eye irrigated, patch placed and rx for erythromycin    Eye Drainage  Associated symptoms include congestion, coughing and a sore throat.       Review of Systems   Constitutional: Negative.    HENT:  Positive for congestion, ear pain and sore throat.    Eyes:  Positive for blurred vision, pain, discharge and redness.   Respiratory:  Positive for cough.    Cardiovascular: Negative.    Gastrointestinal: Negative.    Genitourinary: Negative.    Musculoskeletal: Negative.    Skin: Negative.        Medications, Allergies, and current problem list reviewed today in Epic.     Objective:     /70 (BP Location: Left arm, Patient Position: Sitting, BP Cuff Size: Adult)   Pulse 90   Temp 36.5 °C (97.7 °F) (Temporal)   Resp 16   Ht 1.88 m (6' 2\")   Wt 75 kg (165 lb 5.5 oz)   SpO2 98%     Physical Exam  Vitals and nursing note reviewed.   Constitutional:       Appearance: Normal appearance.   HENT:      Head: Normocephalic and atraumatic.      Right Ear: Tympanic membrane normal.      Left Ear: Tympanic membrane normal.      Nose: Congestion present.      Mouth/Throat:      Pharynx: Oropharynx is clear. No posterior oropharyngeal erythema.   Cardiovascular:      Rate and Rhythm: Normal rate and regular rhythm.      Pulses: Normal pulses.      Heart sounds: Normal heart sounds.   Pulmonary:      Breath sounds: No wheezing, rhonchi or rales.   Chest:      Chest wall: No tenderness.   Musculoskeletal:      Cervical back: Tenderness present.   Lymphadenopathy:      Cervical: No cervical adenopathy.   Neurological:      Mental Status: He is alert. "         Assessment/Plan:     Diagnosis and associated orders:     1. Abrasion of left cornea, initial encounter  erythromycin 5 MG/GM Ointment      2. Acute nasopharyngitis           Comments/MDM:              Differential diagnosis, natural history, supportive care, and indications for immediate follow-up discussed.    Advised the patient to follow-up with the primary care physician for recheck, reevaluation, and consideration of further management.    Please note that this dictation was created using voice recognition software. I have made a reasonable attempt to correct obvious errors, but I expect that there are errors of grammar and possibly content that I did not discover before finalizing the note.    This note was electronically signed by Imtiaz Dorsey M.D.

## 2025-07-14 ENCOUNTER — PATIENT MESSAGE (OUTPATIENT)
Dept: CARDIOLOGY | Facility: MEDICAL CENTER | Age: 65
End: 2025-07-14
Payer: COMMERCIAL

## 2025-07-24 DIAGNOSIS — E78.2 MIXED HYPERLIPIDEMIA: ICD-10-CM

## 2025-07-24 RX ORDER — ROSUVASTATIN CALCIUM 20 MG/1
20 TABLET, COATED ORAL EVERY EVENING
Qty: 90 TABLET | Refills: 0 | Status: SHIPPED | OUTPATIENT
Start: 2025-07-24

## 2025-07-24 NOTE — TELEPHONE ENCOUNTER
Is the patient due for a refill? Yes    Was the patient seen the last 15 months? Yes    Date of last office visit: 07.29.25    Does the patient have an upcoming appointment?  No    Provider to refill:LH    Does the patient have longterm Plus and need 100-day supply? (This applies to ALL medications) Patient does not have SCP

## (undated) DEVICE — CANISTER SUCTION 3000ML MECHANICAL FILTER AUTO SHUTOFF MEDI-VAC NONSTERILE LF DISP  (40EA/CA)

## (undated) DEVICE — TUBING CLEARLINK DUO-VENT - C-FLO (48EA/CA)

## (undated) DEVICE — BITE BLOCK ADULT 60FR (100EA/CA)

## (undated) DEVICE — NEPTUNE 4 PORT MANIFOLD - (20/PK)

## (undated) DEVICE — KIT ANESTHESIA W/CIRCUIT & 3/LT BAG W/FILTER (20EA/CA)

## (undated) DEVICE — ELECTRODE 850 FOAM ADHESIVE - HYDROGEL RADIOTRNSPRNT (50/PK)

## (undated) DEVICE — TOOL DISSECT MATCH HEAD

## (undated) DEVICE — LACTATED RINGERS INJ 1000 ML - (14EA/CA 60CA/PF)

## (undated) DEVICE — DEVICE MONOPOLAR RF PEAK PLASMABLADE 3.0S

## (undated) DEVICE — KIT ROOM DECONTAMINATION

## (undated) DEVICE — GLOVE BIOGEL INDICATOR SZ 8 SURGICAL PF LTX - (50/BX 4BX/CA)

## (undated) DEVICE — SPONGE GAUZE NON-STERILE 4X4 - (2000/CA 10PK/CA)

## (undated) DEVICE — SLEEVE, VASO, THIGH, MED

## (undated) DEVICE — SUTURE 2-0 VICRYL PLUS CT-1 - 8 X 18 INCH(12/BX)

## (undated) DEVICE — GOWN WARMING STANDARD FLEX - (30/CA)

## (undated) DEVICE — PACK NEURO - (2EA/CA)

## (undated) DEVICE — FORCEP RADIAL JAW 4 STANDARD CAPACITY W/NEEDLE 240CM (40EA/BX)

## (undated) DEVICE — GLOVE, LITE (PAIR)

## (undated) DEVICE — SPONGE GAUZESTER 4 X 4 4PLY - (128PK/CA)

## (undated) DEVICE — GLOVE BIOGEL SZ 7 SURGICAL PF LTX - (50PR/BX 4BX/CA)

## (undated) DEVICE — SUCTION INSTRUMENT YANKAUER BULBOUS TIP W/O VENT (50EA/CA)

## (undated) DEVICE — DERMABOND ADVANCED - (12EA/BX)

## (undated) DEVICE — SET EXTENSION WITH 2 PORTS (48EA/CA) ***PART #2C8610 IS A SUBSTITUTE*****

## (undated) DEVICE — TUBE E-T HI-LO CUFF 7.0MM (10EA/PK)

## (undated) DEVICE — GLOVE BIOGEL INDICATOR SZ 7.5 SURGICAL PF LTX - (50PR/BX 4BX/CA)

## (undated) DEVICE — LACTATED RINGERS INJ. 500 ML - (24EA/CA)

## (undated) DEVICE — HEADREST PRONEVIEW LARGE - (10/CA)

## (undated) DEVICE — SITE INJ 7/8IN IV M LL ADPR - (100/CA) THIS IS A CUSTOM ITEM AND HAS A 30 DAY LEAD TIME

## (undated) DEVICE — KIT  I.V. START (100EA/CA)

## (undated) DEVICE — TUBE CONNECTING SUCTION - CLEAR PLASTIC STERILE 72 IN (50EA/CA)

## (undated) DEVICE — KIT SURGIFLO W/OUT THROMBIN - (6EA/CA)

## (undated) DEVICE — SET LEADWIRE 5 LEAD BEDSIDE DISPOSABLE ECG (1SET OF 5/EA)

## (undated) DEVICE — ELECTRODE DUAL RETURN W/ CORD - (50/PK)

## (undated) DEVICE — PROTECTOR ULNA NERVE - (36PR/CA)

## (undated) DEVICE — GOWN SURGEONS LARGE - (32/CA)

## (undated) DEVICE — DRAPE SURG STERI-DRAPE 7X11OD - (40EA/CA)

## (undated) DEVICE — TUBING C&T SET FLYING LEADS DRAIN TUBING (10EA/BX)

## (undated) DEVICE — CANNULA W/ SUPPLY TUBING O2 - (50/CA)

## (undated) DEVICE — DRAPE LAPAROTOMY T SHEET - (12EA/CA)

## (undated) DEVICE — TUBE SUCTION YANKAUER  1/4 X 6FT (20EA/CA)"

## (undated) DEVICE — MASK ANESTHESIA ADULT  - (100/CA)

## (undated) DEVICE — ARMREST CRADLE FOAM - (2PR/PK 12PR/CA)

## (undated) DEVICE — SENSOR SPO2 NEO LNCS ADHESIVE (20/BX) SEE USER NOTES

## (undated) DEVICE — SYRINGE DISP. 50CC LS - (40/BX)

## (undated) DEVICE — BLADE SURGICAL CLIPPER - (50EA/CA)

## (undated) DEVICE — SODIUM CHL IRRIGATION 0.9% 1000ML (12EA/CA)

## (undated) DEVICE — SUTURE 1 VICRYL PLUS CT-1 - 18 INCH (12/BX)

## (undated) DEVICE — TUNNELING TOOL

## (undated) DEVICE — SUTURE GENERAL

## (undated) DEVICE — SENSOR SPO2 ADULT LNCS ADTX (20/BX) ORDER ITEM #19593

## (undated) DEVICE — BASIN EMESIS DISP. - (250/CA)

## (undated) DEVICE — SUTURE 3-0 SILK SH C/R 18 IN - (12/BX)

## (undated) DEVICE — MIDAS LUBRICATOR DIFFUSER PACK (4EA/CA)

## (undated) DEVICE — GLOVE BIOGEL SZ 8 SURGICAL PF LTX - (50PR/BX 4BX/CA)

## (undated) DEVICE — SEALER BIPOLAR 2.3 AQUAMANTYS

## (undated) DEVICE — CHLORAPREP 26 ML APPLICATOR - ORANGE TINT(25/CA)

## (undated) DEVICE — REMOTE CONTROL